# Patient Record
Sex: FEMALE | Race: BLACK OR AFRICAN AMERICAN | NOT HISPANIC OR LATINO | Employment: OTHER | URBAN - METROPOLITAN AREA
[De-identification: names, ages, dates, MRNs, and addresses within clinical notes are randomized per-mention and may not be internally consistent; named-entity substitution may affect disease eponyms.]

---

## 2018-01-26 ENCOUNTER — OFFICE VISIT (OUTPATIENT)
Dept: MEDICAL GROUP | Facility: MEDICAL CENTER | Age: 33
End: 2018-01-26
Payer: COMMERCIAL

## 2018-01-26 ENCOUNTER — HOSPITAL ENCOUNTER (OUTPATIENT)
Facility: MEDICAL CENTER | Age: 33
End: 2018-01-26
Attending: FAMILY MEDICINE
Payer: COMMERCIAL

## 2018-01-26 VITALS
TEMPERATURE: 98.6 F | BODY MASS INDEX: 33.86 KG/M2 | SYSTOLIC BLOOD PRESSURE: 110 MMHG | HEIGHT: 62 IN | OXYGEN SATURATION: 95 % | WEIGHT: 184 LBS | DIASTOLIC BLOOD PRESSURE: 86 MMHG | HEART RATE: 100 BPM

## 2018-01-26 DIAGNOSIS — R11.14 BILIOUS VOMITING WITH NAUSEA: ICD-10-CM

## 2018-01-26 DIAGNOSIS — F31.9 BIPOLAR 1 DISORDER (HCC): ICD-10-CM

## 2018-01-26 DIAGNOSIS — R30.0 DYSURIA: ICD-10-CM

## 2018-01-26 DIAGNOSIS — E66.9 OBESITY (BMI 30-39.9): ICD-10-CM

## 2018-01-26 DIAGNOSIS — N76.0 ACUTE VAGINITIS: ICD-10-CM

## 2018-01-26 DIAGNOSIS — F41.1 GAD (GENERALIZED ANXIETY DISORDER): ICD-10-CM

## 2018-01-26 DIAGNOSIS — K27.9 PUD (PEPTIC ULCER DISEASE): ICD-10-CM

## 2018-01-26 DIAGNOSIS — R10.30 LOWER ABDOMINAL PAIN: ICD-10-CM

## 2018-01-26 LAB
APPEARANCE UR: NORMAL
BILIRUB UR STRIP-MCNC: NEGATIVE MG/DL
CANDIDA DNA VAG QL PROBE+SIG AMP: NEGATIVE
COLOR UR AUTO: NORMAL
G VAGINALIS DNA VAG QL PROBE+SIG AMP: NEGATIVE
GLUCOSE UR STRIP.AUTO-MCNC: NEGATIVE MG/DL
KETONES UR STRIP.AUTO-MCNC: NEGATIVE MG/DL
LEUKOCYTE ESTERASE UR QL STRIP.AUTO: NEGATIVE
NITRITE UR QL STRIP.AUTO: NEGATIVE
PH UR STRIP.AUTO: 6 [PH] (ref 5–8)
PROT UR QL STRIP: NORMAL MG/DL
RBC UR QL AUTO: NEGATIVE
SP GR UR STRIP.AUTO: 1.02
T VAGINALIS DNA VAG QL PROBE+SIG AMP: NEGATIVE
UROBILINOGEN UR STRIP-MCNC: NEGATIVE MG/DL

## 2018-01-26 PROCEDURE — 87480 CANDIDA DNA DIR PROBE: CPT

## 2018-01-26 PROCEDURE — 87591 N.GONORRHOEAE DNA AMP PROB: CPT

## 2018-01-26 PROCEDURE — 99204 OFFICE O/P NEW MOD 45 MIN: CPT | Mod: 25 | Performed by: FAMILY MEDICINE

## 2018-01-26 PROCEDURE — 99000 SPECIMEN HANDLING OFFICE-LAB: CPT | Performed by: FAMILY MEDICINE

## 2018-01-26 PROCEDURE — 87491 CHLMYD TRACH DNA AMP PROBE: CPT

## 2018-01-26 PROCEDURE — 87510 GARDNER VAG DNA DIR PROBE: CPT

## 2018-01-26 PROCEDURE — 87660 TRICHOMONAS VAGIN DIR PROBE: CPT

## 2018-01-26 PROCEDURE — 81002 URINALYSIS NONAUTO W/O SCOPE: CPT | Performed by: FAMILY MEDICINE

## 2018-01-26 PROCEDURE — 81001 URINALYSIS AUTO W/SCOPE: CPT

## 2018-01-26 RX ORDER — CLONAZEPAM 1 MG/1
1 TABLET ORAL 2 TIMES DAILY PRN
Refills: 0 | COMMUNITY
Start: 2018-01-02 | End: 2018-11-01

## 2018-01-26 RX ORDER — LAMOTRIGINE 200 MG/1
200 TABLET ORAL DAILY
COMMUNITY
End: 2018-07-09

## 2018-01-26 RX ORDER — ZOLPIDEM TARTRATE 10 MG/1
10 TABLET ORAL
Refills: 0 | COMMUNITY
Start: 2018-01-02

## 2018-01-26 RX ORDER — PROMETHAZINE HYDROCHLORIDE 25 MG/1
25 TABLET ORAL EVERY 6 HOURS PRN
Qty: 30 TAB | Refills: 0 | Status: SHIPPED | OUTPATIENT
Start: 2018-01-26 | End: 2018-07-09

## 2018-01-26 RX ORDER — BUPROPION HYDROCHLORIDE 150 MG/1
150 TABLET ORAL NIGHTLY PRN
Refills: 0 | COMMUNITY
Start: 2018-01-02 | End: 2018-07-09

## 2018-01-26 ASSESSMENT — PATIENT HEALTH QUESTIONNAIRE - PHQ9
5. POOR APPETITE OR OVEREATING: 1 - SEVERAL DAYS
CLINICAL INTERPRETATION OF PHQ2 SCORE: 2
SUM OF ALL RESPONSES TO PHQ QUESTIONS 1-9: 8

## 2018-01-26 NOTE — ASSESSMENT & PLAN NOTE
Symptom onset: 10 days ago   Current symptoms: painful, urgent, frequent voids. No blood noted in urine.  Since onset symptoms are: unchanged  Treatments tried: none  Associated symptoms: negative for fever,   Is having flank pain, nausea and vomiting, and slight vaginal discharge  History is positive for frequent UTI.

## 2018-01-26 NOTE — PATIENT INSTRUCTIONS
Nausea and Vomiting  Nausea is a sick feeling that often comes before throwing up (vomiting). Vomiting is a reflex where stomach contents come out of your mouth. Vomiting can cause severe loss of body fluids (dehydration). Children and elderly adults can become dehydrated quickly, especially if they also have diarrhea. Nausea and vomiting are symptoms of a condition or disease. It is important to find the cause of your symptoms.  CAUSES   · Direct irritation of the stomach lining. This irritation can result from increased acid production (gastroesophageal reflux disease), infection, food poisoning, taking certain medicines (such as nonsteroidal anti-inflammatory drugs), alcohol use, or tobacco use.  · Signals from the brain. These signals could be caused by a headache, heat exposure, an inner ear disturbance, increased pressure in the brain from injury, infection, a tumor, or a concussion, pain, emotional stimulus, or metabolic problems.  · An obstruction in the gastrointestinal tract (bowel obstruction).  · Illnesses such as diabetes, hepatitis, gallbladder problems, appendicitis, kidney problems, cancer, sepsis, atypical symptoms of a heart attack, or eating disorders.  · Medical treatments such as chemotherapy and radiation.  · Receiving medicine that makes you sleep (general anesthetic) during surgery.  DIAGNOSIS  Your caregiver may ask for tests to be done if the problems do not improve after a few days. Tests may also be done if symptoms are severe or if the reason for the nausea and vomiting is not clear. Tests may include:  · Urine tests.  · Blood tests.  · Stool tests.  · Cultures (to look for evidence of infection).  · X-rays or other imaging studies.  Test results can help your caregiver make decisions about treatment or the need for additional tests.  TREATMENT  You need to stay well hydrated. Drink frequently but in small amounts. You may wish to drink water, sports drinks, clear broth, or eat frozen  ice pops or gelatin dessert to help stay hydrated. When you eat, eating slowly may help prevent nausea. There are also some antinausea medicines that may help prevent nausea.  HOME CARE INSTRUCTIONS   · Take all medicine as directed by your caregiver.  · If you do not have an appetite, do not force yourself to eat. However, you must continue to drink fluids.  · If you have an appetite, eat a normal diet unless your caregiver tells you differently.  ¨ Eat a variety of complex carbohydrates (rice, wheat, potatoes, bread), lean meats, yogurt, fruits, and vegetables.  ¨ Avoid high-fat foods because they are more difficult to digest.  · Drink enough water and fluids to keep your urine clear or pale yellow.  · If you are dehydrated, ask your caregiver for specific rehydration instructions. Signs of dehydration may include:  ¨ Severe thirst.  ¨ Dry lips and mouth.  ¨ Dizziness.  ¨ Dark urine.  ¨ Decreasing urine frequency and amount.  ¨ Confusion.  ¨ Rapid breathing or pulse.  SEEK IMMEDIATE MEDICAL CARE IF:   · You have blood or brown flecks (like coffee grounds) in your vomit.  · You have black or bloody stools.  · You have a severe headache or stiff neck.  · You are confused.  · You have severe abdominal pain.  · You have chest pain or trouble breathing.  · You do not urinate at least once every 8 hours.  · You develop cold or clammy skin.  · You continue to vomit for longer than 24 to 48 hours.  · You have a fever.  MAKE SURE YOU:   · Understand these instructions.  · Will watch your condition.  · Will get help right away if you are not doing well or get worse.     This information is not intended to replace advice given to you by your health care provider. Make sure you discuss any questions you have with your health care provider.     Document Released: 12/18/2006 Document Revised: 03/11/2013 Document Reviewed: 05/16/2012  [employer] Interactive Patient Education ©2016 [employer]

## 2018-01-26 NOTE — ASSESSMENT & PLAN NOTE
Vomiting since yesterday - vomited in the exam room.  Ate pizza last night for dinner.  No diarrhea. Zofran causes constipation.

## 2018-01-27 LAB
AMORPH CRY #/AREA URNS HPF: PRESENT /HPF
APPEARANCE UR: ABNORMAL
BACTERIA #/AREA URNS HPF: NEGATIVE /HPF
BILIRUB UR QL STRIP.AUTO: NEGATIVE
C TRACH DNA SPEC QL NAA+PROBE: NEGATIVE
COLOR UR: YELLOW
CULTURE IF INDICATED INDCX: NO UA CULTURE
EPI CELLS #/AREA URNS HPF: NORMAL /HPF
GLUCOSE UR STRIP.AUTO-MCNC: NEGATIVE MG/DL
HYALINE CASTS #/AREA URNS LPF: NORMAL /LPF
KETONES UR STRIP.AUTO-MCNC: NEGATIVE MG/DL
LEUKOCYTE ESTERASE UR QL STRIP.AUTO: NEGATIVE
MICRO URNS: ABNORMAL
MUCOUS THREADS #/AREA URNS HPF: NORMAL /HPF
N GONORRHOEA DNA SPEC QL NAA+PROBE: NEGATIVE
NITRITE UR QL STRIP.AUTO: NEGATIVE
PH UR STRIP.AUTO: 6 [PH]
PROT UR QL STRIP: 30 MG/DL
RBC # URNS HPF: NORMAL /HPF
RBC UR QL AUTO: NEGATIVE
RENAL EPI CELLS #/AREA URNS HPF: NORMAL /HPF
SP GR UR STRIP.AUTO: 1.02
SPECIMEN SOURCE: NORMAL
UROBILINOGEN UR STRIP.AUTO-MCNC: NORMAL MG/DL
WBC #/AREA URNS HPF: NORMAL /HPF

## 2018-01-31 ENCOUNTER — TELEPHONE (OUTPATIENT)
Dept: MEDICAL GROUP | Facility: MEDICAL CENTER | Age: 33
End: 2018-01-31

## 2018-01-31 NOTE — TELEPHONE ENCOUNTER
----- Message from Hallie Norris M.D. sent at 1/31/2018  8:32 AM PST -----  Please notify patient of their normal lab results.  Hallie Norris M.D.

## 2018-01-31 NOTE — LETTER
January 31, 2018        Kate Backner  [address]  [facility] [address]        Dear Kate:    This letter is to notify you that your lab results were normal.     If you have any questions or concerns, please don't hesitate to call.        Sincerely,        Hallie Norris M.D.    Electronically Signed

## 2018-02-04 NOTE — ASSESSMENT & PLAN NOTE
Patient has been diagnosed with bipolar.  Stable. Currently taking Wellbutrin as prescribed.   Denies side effects and is tolerating well.  Mood is improved with current medication and therapy.    Patient denies SI/HI.  Depression Screen (PHQ-2/PHQ-9) 1/26/2018   PHQ-2 Total Score 2   PHQ-9 Total Score 8

## 2018-02-04 NOTE — ASSESSMENT & PLAN NOTE
Patient has a history of PUD.  Denies early satiety, unintentional weight loss, choking, persistent burning pain in chest or upper abdomen.  No black or bloody stools.

## 2018-02-04 NOTE — PROGRESS NOTES
Chief Complaint   Patient presents with   • Establish Care     from [place], saw [facility] once   • Urinary Frequency     w/ abdominal pain, cloudy, odorous urine, x few weeks         Kate Shelton is a 33 y.o. female here to establish care and for evaluation and management of:        HPI:    Bilious vomiting with nausea  Vomiting since yesterday - vomited in the exam room.  Ate pizza last night for dinner.  No diarrhea. Zofran causes constipation.    Dysuria  Symptom onset: 10 days ago   Current symptoms: painful, urgent, frequent voids. No blood noted in urine.  Since onset symptoms are: unchanged  Treatments tried: none  Associated symptoms: negative for fever,   Is having flank pain, nausea and vomiting, and slight vaginal discharge  History is positive for frequent UTI.       Acute vaginitis  Patient complains of a white vaginal discharge with foul odor to her urine.    Bipolar 1 disorder (CMS-[facility])  Patient has been diagnosed with bipolar.  Stable. Currently taking Wellbutrin as prescribed.   Denies side effects and is tolerating well.  Mood is improved with current medication and therapy.    Patient denies SI/HI.  Depression Screen (PHQ-2/PHQ-9) 1/26/2018   PHQ-2 Total Score 2   PHQ-9 Total Score 8           ERICA (generalized anxiety disorder)  Patient complains of ERICA.  Recently moved from [facility].    PUD (peptic ulcer disease)  Patient has a history of PUD.  Denies early satiety, unintentional weight loss, choking, persistent burning pain in chest or upper abdomen.  No black or bloody stools.        Allergies   Allergen Reactions   • Nsaids      Hx ulcers   • Zofran [Ondansetron]        Current medicines (including changes today)  Current Outpatient Prescriptions   Medication Sig Dispense Refill   • buPROPion (WELLBUTRIN XL) 150 MG XL tablet Take 150 mg by mouth at bedtime as needed.  0   • clonazepam (KLONOPIN) 1 MG Tab Take 1 mg by mouth 2 times a day as needed.  0   • zolpidem (AMBIEN) 10 MG Tab Take 10 mg  "by mouth at bedtime as needed.  0   • lamotrigine (LAMICTAL) 200 MG tablet Take 200 mg by mouth every day.     • promethazine (PHENERGAN) 25 MG Tab Take 1 Tab by mouth every 6 hours as needed for Nausea/Vomiting. 30 Tab 0     No current facility-administered medications for this visit.      She  has a past medical history of Anxiety; Bipolar 1 disorder (CMS-[facility]); Depression; DVT of upper extremity (deep vein thrombosis) (CMS-HCC); GERD (gastroesophageal reflux disease); IBD (inflammatory bowel disease); PTSD (post-traumatic stress disorder); and PUD (peptic ulcer disease).  She  has a past surgical history that includes mammoplasty augmentation.  Social History   Substance Use Topics   • Smoking status: Current Every Day Smoker     Packs/day: 0.50     Years: 6.00     Types: Cigarettes   • Smokeless tobacco: Never Used   • Alcohol use Yes      Comment: occasionally     Social History     Social History Narrative   • No narrative on file     Family History   Problem Relation Age of Onset   • Psychiatry Mother    • Other Mother      killed in [place]   • Heart Disease Father    • Stroke Father      Family Status   Relation Status   • Mother    • Father Alive   • Maternal Grandmother Alive   • Maternal Grandfather Alive         ROS  No fever or chills.   No chest pain or palpitations.  No cough or SOB.  .  No black or bloody stools.  All other systems reviewed and are negative     Objective:     Blood pressure 110/86, pulse 100, temperature 37 °C (98.6 °F), height 1.575 m (5' 2\"), weight 83.5 kg (184 lb), SpO2 95 %, not currently breastfeeding. Body mass index is 33.65 kg/m².  Physical Exam:      Well developed, well nourished.  Alert, oriented in no acute distress.  Psych: Eye contact is good, speech goal directed, affect calm  Eyes: conjunctiva non-injected, sclera non-icteric.  Ears: Pinna normal. TM pearly gray.   Nose: Nares are patent.  Normal mucosa  Mouth: Oral mucous membranes pink and moist with no " lesions.  Neck Supple.  No adenopathy or masses in the neck or supraclavicular regions. No thyromegaly  Lungs: clear to auscultation bilaterally with good excursion. No wheezes or rhonchi  CV: regular rate and rhythm. No murmur  Abdomen: soft, nontender, no masses or organomegaly.  No rebound or guarding.  PELVIC:Perineum and external genitalia normal without rash. Vagina with white discharge. Cervix without visible lesions or discharge. Bimanual exam without adnexal masses or cervical motion tenderness.  Ext: no edema, color normal, vascularity normal, temperature normal      Assessment and Plan:   The following treatment plan was discussed    1. Bilious vomiting with nausea  Increase fluids and rest.  Phenergan as needed.  Go to ED if persists  - POCT Urinalysis  - CBC WITH DIFFERENTIAL; Future  - COMP METABOLIC PANEL; Future  - WESTERGREN SED RATE; Future  - promethazine (PHENERGAN) 25 MG Tab; Take 1 Tab by mouth every 6 hours as needed for Nausea/Vomiting.  Dispense: 30 Tab; Refill: 0    2. Dysuria    - POCT Urinalysis  - URINALYSIS,CULTURE IF INDICATED; Future    3. Obesity (BMI 30-39.9)    - Patient identified as having weight management issue.  Appropriate orders and counseling given.    4. Acute vaginitis  Cultures pending  - VAGINAL PATHOGENS DNA PANEL; Future  - CHLAMYDIA/GC PCR URINE OR SWAB; Future    5. Bipolar 1 disorder (CMS-HCC)  Continue follow-up with psychiatrist    6. ERICA (generalized anxiety disorder)  Continue follow-up with psychiatrist    7. PUD (peptic ulcer disease)  Start PPI if symptoms start    8. Lower abdominal pain    - CBC WITH DIFFERENTIAL; Future  - COMP METABOLIC PANEL; Future  - WESTERGREN SED RATE; Future      Records requested.    Any change or worsening of signs or symptoms, patient encouraged to follow-up or report to the emergency room for further evaluation. Patient understands and agrees.    Followup: Return in about 4 weeks (around 2/23/2018).

## 2018-02-15 ENCOUNTER — TELEPHONE (OUTPATIENT)
Dept: MEDICAL GROUP | Facility: MEDICAL CENTER | Age: 33
End: 2018-02-15

## 2018-02-15 LAB
ALBUMIN SERPL-MCNC: 4.5 G/DL (ref 3.5–5.5)
ALBUMIN/GLOB SERPL: 1.9 {RATIO} (ref 1.2–2.2)
ALP SERPL-CCNC: 78 IU/L (ref 39–117)
ALT SERPL-CCNC: 12 IU/L (ref 0–32)
AST SERPL-CCNC: 17 IU/L (ref 0–40)
BASOPHILS # BLD AUTO: 0 X10E3/UL (ref 0–0.2)
BASOPHILS NFR BLD AUTO: 0 %
BILIRUB SERPL-MCNC: 0.4 MG/DL (ref 0–1.2)
BUN SERPL-MCNC: 9 MG/DL (ref 6–20)
BUN/CREAT SERPL: 11 (ref 9–23)
CALCIUM SERPL-MCNC: 9.4 MG/DL (ref 8.7–10.2)
CHLORIDE SERPL-SCNC: 103 MMOL/L (ref 96–106)
CO2 SERPL-SCNC: 21 MMOL/L (ref 18–29)
CREAT SERPL-MCNC: 0.84 MG/DL (ref 0.57–1)
EOSINOPHIL # BLD AUTO: 0.1 X10E3/UL (ref 0–0.4)
EOSINOPHIL NFR BLD AUTO: 1 %
ERYTHROCYTE [DISTWIDTH] IN BLOOD BY AUTOMATED COUNT: 13.2 % (ref 12.3–15.4)
ERYTHROCYTE [SEDIMENTATION RATE] IN BLOOD BY WESTERGREN METHOD: 2 MM/HR (ref 0–32)
GFR SERPLBLD CREATININE-BSD FMLA CKD-EPI: 107 ML/MIN/1.73
GFR SERPLBLD CREATININE-BSD FMLA CKD-EPI: 93 ML/MIN/1.73
GLOBULIN SER CALC-MCNC: 2.4 G/DL (ref 1.5–4.5)
GLUCOSE SERPL-MCNC: 80 MG/DL (ref 65–99)
HCT VFR BLD AUTO: 46.1 % (ref 34–46.6)
HGB BLD-MCNC: 15.4 G/DL (ref 11.1–15.9)
IMM GRANULOCYTES # BLD: 0 X10E3/UL (ref 0–0.1)
IMM GRANULOCYTES NFR BLD: 0 %
IMMATURE CELLS  115398: NORMAL
LYMPHOCYTES # BLD AUTO: 2.9 X10E3/UL (ref 0.7–3.1)
LYMPHOCYTES NFR BLD AUTO: 31 %
MCH RBC QN AUTO: 31.2 PG (ref 26.6–33)
MCHC RBC AUTO-ENTMCNC: 33.4 G/DL (ref 31.5–35.7)
MCV RBC AUTO: 94 FL (ref 79–97)
MONOCYTES # BLD AUTO: 0.6 X10E3/UL (ref 0.1–0.9)
MONOCYTES NFR BLD AUTO: 6 %
MORPHOLOGY BLD-IMP: NORMAL
NEUTROPHILS # BLD AUTO: 5.7 X10E3/UL (ref 1.4–7)
NEUTROPHILS NFR BLD AUTO: 62 %
NRBC BLD AUTO-RTO: NORMAL %
PLATELET # BLD AUTO: 275 X10E3/UL (ref 150–379)
POTASSIUM SERPL-SCNC: 4.3 MMOL/L (ref 3.5–5.2)
PROT SERPL-MCNC: 6.9 G/DL (ref 6–8.5)
RBC # BLD AUTO: 4.93 X10E6/UL (ref 3.77–5.28)
SODIUM SERPL-SCNC: 139 MMOL/L (ref 134–144)
WBC # BLD AUTO: 9.4 X10E3/UL (ref 3.4–10.8)

## 2018-02-15 NOTE — TELEPHONE ENCOUNTER
Phone Number Called: [phone] (home)     Message: Pt informed labs normal.     Left Message for patient to call back: no

## 2018-02-15 NOTE — TELEPHONE ENCOUNTER
----- Message from Hallie Norris M.D. sent at 2/15/2018 12:01 PM PST -----  Please notify patient of their normal lab results.  Hallie Norris M.D.

## 2018-07-09 ENCOUNTER — APPOINTMENT (OUTPATIENT)
Dept: RADIOLOGY | Facility: MEDICAL CENTER | Age: 33
End: 2018-07-09
Attending: EMERGENCY MEDICINE
Payer: COMMERCIAL

## 2018-07-09 ENCOUNTER — HOSPITAL ENCOUNTER (OUTPATIENT)
Facility: MEDICAL CENTER | Age: 33
End: 2018-07-10
Attending: EMERGENCY MEDICINE | Admitting: INTERNAL MEDICINE
Payer: COMMERCIAL

## 2018-07-09 DIAGNOSIS — I26.99 OTHER ACUTE PULMONARY EMBOLISM WITHOUT ACUTE COR PULMONALE (HCC): ICD-10-CM

## 2018-07-09 PROBLEM — F17.200 TOBACCO USE DISORDER: Status: ACTIVE | Noted: 2018-07-09

## 2018-07-09 PROBLEM — J02.9 PHARYNGITIS: Status: ACTIVE | Noted: 2018-07-09

## 2018-07-09 PROBLEM — D68.59 HYPERCOAGULABLE STATE (HCC): Status: ACTIVE | Noted: 2018-07-09

## 2018-07-09 LAB
ANION GAP SERPL CALC-SCNC: 6 MMOL/L (ref 0–11.9)
BASOPHILS # BLD AUTO: 0.3 % (ref 0–1.8)
BASOPHILS # BLD: 0.03 K/UL (ref 0–0.12)
BUN SERPL-MCNC: <5 MG/DL (ref 8–22)
CALCIUM SERPL-MCNC: 9 MG/DL (ref 8.4–10.2)
CHLORIDE SERPL-SCNC: 106 MMOL/L (ref 96–112)
CO2 SERPL-SCNC: 25 MMOL/L (ref 20–33)
CREAT SERPL-MCNC: 0.81 MG/DL (ref 0.5–1.4)
EOSINOPHIL # BLD AUTO: 0.13 K/UL (ref 0–0.51)
EOSINOPHIL NFR BLD: 1.1 % (ref 0–6.9)
ERYTHROCYTE [DISTWIDTH] IN BLOOD BY AUTOMATED COUNT: 44.2 FL (ref 35.9–50)
GLUCOSE SERPL-MCNC: 79 MG/DL (ref 65–99)
HCG SERPL QL: NEGATIVE
HCT VFR BLD AUTO: 45.8 % (ref 37–47)
HGB BLD-MCNC: 15.9 G/DL (ref 12–16)
IMM GRANULOCYTES # BLD AUTO: 0.04 K/UL (ref 0–0.11)
IMM GRANULOCYTES NFR BLD AUTO: 0.3 % (ref 0–0.9)
LYMPHOCYTES # BLD AUTO: 2.61 K/UL (ref 1–4.8)
LYMPHOCYTES NFR BLD: 22.5 % (ref 22–41)
MCH RBC QN AUTO: 32.8 PG (ref 27–33)
MCHC RBC AUTO-ENTMCNC: 34.7 G/DL (ref 33.6–35)
MCV RBC AUTO: 94.4 FL (ref 81.4–97.8)
MONOCYTES # BLD AUTO: 0.83 K/UL (ref 0–0.85)
MONOCYTES NFR BLD AUTO: 7.2 % (ref 0–13.4)
NEUTROPHILS # BLD AUTO: 7.94 K/UL (ref 2–7.15)
NEUTROPHILS NFR BLD: 68.6 % (ref 44–72)
NRBC # BLD AUTO: 0 K/UL
NRBC BLD-RTO: 0 /100 WBC
PLATELET # BLD AUTO: 261 K/UL (ref 164–446)
PMV BLD AUTO: 10.7 FL (ref 9–12.9)
POTASSIUM SERPL-SCNC: 3.8 MMOL/L (ref 3.6–5.5)
RBC # BLD AUTO: 4.85 M/UL (ref 4.2–5.4)
S PYO AG THROAT QL: NORMAL
SIGNIFICANT IND 70042: NORMAL
SITE SITE: NORMAL
SODIUM SERPL-SCNC: 137 MMOL/L (ref 135–145)
SOURCE SOURCE: NORMAL
WBC # BLD AUTO: 11.6 K/UL (ref 4.8–10.8)

## 2018-07-09 PROCEDURE — 96374 THER/PROPH/DIAG INJ IV PUSH: CPT

## 2018-07-09 PROCEDURE — 94760 N-INVAS EAR/PLS OXIMETRY 1: CPT

## 2018-07-09 PROCEDURE — 99220 PR INITIAL OBSERVATION CARE,LEVL III: CPT | Performed by: INTERNAL MEDICINE

## 2018-07-09 PROCEDURE — 93005 ELECTROCARDIOGRAM TRACING: CPT

## 2018-07-09 PROCEDURE — 700117 HCHG RX CONTRAST REV CODE 255: Performed by: EMERGENCY MEDICINE

## 2018-07-09 PROCEDURE — 87081 CULTURE SCREEN ONLY: CPT

## 2018-07-09 PROCEDURE — 96372 THER/PROPH/DIAG INJ SC/IM: CPT

## 2018-07-09 PROCEDURE — 700111 HCHG RX REV CODE 636 W/ 250 OVERRIDE (IP): Performed by: EMERGENCY MEDICINE

## 2018-07-09 PROCEDURE — G0378 HOSPITAL OBSERVATION PER HR: HCPCS

## 2018-07-09 PROCEDURE — 71275 CT ANGIOGRAPHY CHEST: CPT

## 2018-07-09 PROCEDURE — 94640 AIRWAY INHALATION TREATMENT: CPT

## 2018-07-09 PROCEDURE — A9270 NON-COVERED ITEM OR SERVICE: HCPCS | Performed by: INTERNAL MEDICINE

## 2018-07-09 PROCEDURE — 80048 BASIC METABOLIC PNL TOTAL CA: CPT

## 2018-07-09 PROCEDURE — 93005 ELECTROCARDIOGRAM TRACING: CPT | Performed by: EMERGENCY MEDICINE

## 2018-07-09 PROCEDURE — 700101 HCHG RX REV CODE 250: Performed by: EMERGENCY MEDICINE

## 2018-07-09 PROCEDURE — 87880 STREP A ASSAY W/OPTIC: CPT

## 2018-07-09 PROCEDURE — 700102 HCHG RX REV CODE 250 W/ 637 OVERRIDE(OP): Performed by: INTERNAL MEDICINE

## 2018-07-09 PROCEDURE — 700111 HCHG RX REV CODE 636 W/ 250 OVERRIDE (IP)

## 2018-07-09 PROCEDURE — 36415 COLL VENOUS BLD VENIPUNCTURE: CPT

## 2018-07-09 PROCEDURE — 84703 CHORIONIC GONADOTROPIN ASSAY: CPT

## 2018-07-09 PROCEDURE — 85025 COMPLETE CBC W/AUTO DIFF WBC: CPT

## 2018-07-09 PROCEDURE — 71045 X-RAY EXAM CHEST 1 VIEW: CPT

## 2018-07-09 PROCEDURE — 96375 TX/PRO/DX INJ NEW DRUG ADDON: CPT

## 2018-07-09 PROCEDURE — 99285 EMERGENCY DEPT VISIT HI MDM: CPT

## 2018-07-09 RX ORDER — LAMOTRIGINE 150 MG/1
200 TABLET ORAL 2 TIMES DAILY
COMMUNITY
End: 2018-11-01

## 2018-07-09 RX ORDER — PROMETHAZINE HYDROCHLORIDE 25 MG/1
12.5-25 TABLET ORAL EVERY 4 HOURS PRN
Status: DISCONTINUED | OUTPATIENT
Start: 2018-07-09 | End: 2018-07-10 | Stop reason: HOSPADM

## 2018-07-09 RX ORDER — PROMETHAZINE HYDROCHLORIDE 25 MG/1
12.5-25 SUPPOSITORY RECTAL EVERY 4 HOURS PRN
Status: DISCONTINUED | OUTPATIENT
Start: 2018-07-09 | End: 2018-07-10 | Stop reason: HOSPADM

## 2018-07-09 RX ORDER — ZOLPIDEM TARTRATE 5 MG/1
10 TABLET ORAL NIGHTLY PRN
Status: DISCONTINUED | OUTPATIENT
Start: 2018-07-09 | End: 2018-07-10 | Stop reason: HOSPADM

## 2018-07-09 RX ORDER — NICOTINE 21 MG/24HR
21 PATCH, TRANSDERMAL 24 HOURS TRANSDERMAL
Status: DISCONTINUED | OUTPATIENT
Start: 2018-07-10 | End: 2018-07-10 | Stop reason: HOSPADM

## 2018-07-09 RX ORDER — MORPHINE SULFATE 4 MG/ML
4 INJECTION, SOLUTION INTRAMUSCULAR; INTRAVENOUS ONCE
Status: COMPLETED | OUTPATIENT
Start: 2018-07-09 | End: 2018-07-09

## 2018-07-09 RX ORDER — METOCLOPRAMIDE HYDROCHLORIDE 5 MG/ML
5-10 INJECTION INTRAMUSCULAR; INTRAVENOUS ONCE
Status: COMPLETED | OUTPATIENT
Start: 2018-07-09 | End: 2018-07-09

## 2018-07-09 RX ORDER — LAMOTRIGINE 25 MG/1
150 TABLET ORAL 2 TIMES DAILY
Status: DISCONTINUED | OUTPATIENT
Start: 2018-07-09 | End: 2018-07-10 | Stop reason: HOSPADM

## 2018-07-09 RX ORDER — CLONAZEPAM 0.5 MG/1
1 TABLET ORAL 2 TIMES DAILY PRN
Status: DISCONTINUED | OUTPATIENT
Start: 2018-07-09 | End: 2018-07-10 | Stop reason: HOSPADM

## 2018-07-09 RX ORDER — ACETAMINOPHEN 325 MG/1
650 TABLET ORAL EVERY 6 HOURS PRN
Status: DISCONTINUED | OUTPATIENT
Start: 2018-07-09 | End: 2018-07-10 | Stop reason: HOSPADM

## 2018-07-09 RX ADMIN — MORPHINE SULFATE 4 MG: 4 INJECTION INTRAVENOUS at 18:23

## 2018-07-09 RX ADMIN — ENOXAPARIN SODIUM 80 MG: 100 INJECTION SUBCUTANEOUS at 19:34

## 2018-07-09 RX ADMIN — CLONAZEPAM 1 MG: 0.5 TABLET ORAL at 23:38

## 2018-07-09 RX ADMIN — ZOLPIDEM TARTRATE 10 MG: 5 TABLET ORAL at 23:38

## 2018-07-09 RX ADMIN — ALBUTEROL SULFATE 2.5 MG: 2.5 SOLUTION RESPIRATORY (INHALATION) at 19:45

## 2018-07-09 RX ADMIN — METOCLOPRAMIDE 10 MG: 5 INJECTION, SOLUTION INTRAMUSCULAR; INTRAVENOUS at 18:23

## 2018-07-09 RX ADMIN — LAMOTRIGINE 150 MG: 25 TABLET ORAL at 23:37

## 2018-07-09 RX ADMIN — IOHEXOL 70 ML: 350 INJECTION, SOLUTION INTRAVENOUS at 18:58

## 2018-07-09 ASSESSMENT — ENCOUNTER SYMPTOMS
SPUTUM PRODUCTION: 1
HEMOPTYSIS: 1
NAUSEA: 1
DIAPHORESIS: 1
WHEEZING: 0
CHILLS: 1
FEVER: 0
DEPRESSION: 0
SORE THROAT: 1
PALPITATIONS: 1
COUGH: 1
ABDOMINAL PAIN: 0
CONSTIPATION: 0
DIARRHEA: 0
SHORTNESS OF BREATH: 1
DIZZINESS: 0
EYE DISCHARGE: 0
HEADACHES: 1
VOMITING: 0
EYE PAIN: 0

## 2018-07-09 ASSESSMENT — COPD QUESTIONNAIRES
DO YOU EVER COUGH UP ANY MUCUS OR PHLEGM?: NO/ONLY WITH OCCASIONAL COLDS OR INFECTIONS
HAVE YOU SMOKED AT LEAST 100 CIGARETTES IN YOUR ENTIRE LIFE: YES
DURING THE PAST 4 WEEKS HOW MUCH DID YOU FEEL SHORT OF BREATH: SOME OF THE TIME
COPD SCREENING SCORE: 3

## 2018-07-09 ASSESSMENT — LIFESTYLE VARIABLES
SUBSTANCE_ABUSE: 0
EVER_SMOKED: YES

## 2018-07-09 ASSESSMENT — PAIN SCALES - GENERAL
PAINLEVEL_OUTOF10: 6
PAINLEVEL_OUTOF10: 5
PAINLEVEL_OUTOF10: 4

## 2018-07-10 ENCOUNTER — APPOINTMENT (OUTPATIENT)
Dept: RADIOLOGY | Facility: MEDICAL CENTER | Age: 33
End: 2018-07-10
Attending: HOSPITALIST
Payer: COMMERCIAL

## 2018-07-10 ENCOUNTER — PATIENT OUTREACH (OUTPATIENT)
Dept: HEALTH INFORMATION MANAGEMENT | Facility: OTHER | Age: 33
End: 2018-07-10

## 2018-07-10 VITALS
RESPIRATION RATE: 18 BRPM | HEIGHT: 61 IN | TEMPERATURE: 98.1 F | WEIGHT: 177.91 LBS | HEART RATE: 98 BPM | DIASTOLIC BLOOD PRESSURE: 75 MMHG | SYSTOLIC BLOOD PRESSURE: 111 MMHG | OXYGEN SATURATION: 91 % | BODY MASS INDEX: 33.59 KG/M2

## 2018-07-10 LAB
ALBUMIN SERPL BCP-MCNC: 4.7 G/DL (ref 3.2–4.9)
ALBUMIN/GLOB SERPL: 1.8 G/DL
ALP SERPL-CCNC: 73 U/L (ref 30–99)
ALT SERPL-CCNC: 11 U/L (ref 2–50)
ANION GAP SERPL CALC-SCNC: 8 MMOL/L (ref 0–11.9)
AST SERPL-CCNC: 15 U/L (ref 12–45)
BASOPHILS # BLD AUTO: 0.2 % (ref 0–1.8)
BASOPHILS # BLD: 0.02 K/UL (ref 0–0.12)
BILIRUB SERPL-MCNC: 0.8 MG/DL (ref 0.1–1.5)
BUN SERPL-MCNC: 5 MG/DL (ref 8–22)
CALCIUM SERPL-MCNC: 8.9 MG/DL (ref 8.4–10.2)
CHLORIDE SERPL-SCNC: 107 MMOL/L (ref 96–112)
CO2 SERPL-SCNC: 24 MMOL/L (ref 20–33)
CREAT SERPL-MCNC: 0.72 MG/DL (ref 0.5–1.4)
EKG IMPRESSION: NORMAL
EOSINOPHIL # BLD AUTO: 0.12 K/UL (ref 0–0.51)
EOSINOPHIL NFR BLD: 1.1 % (ref 0–6.9)
ERYTHROCYTE [DISTWIDTH] IN BLOOD BY AUTOMATED COUNT: 44.4 FL (ref 35.9–50)
GLOBULIN SER CALC-MCNC: 2.6 G/DL (ref 1.9–3.5)
GLUCOSE SERPL-MCNC: 101 MG/DL (ref 65–99)
HCT VFR BLD AUTO: 45.9 % (ref 37–47)
HGB BLD-MCNC: 15.8 G/DL (ref 12–16)
IMM GRANULOCYTES # BLD AUTO: 0.03 K/UL (ref 0–0.11)
IMM GRANULOCYTES NFR BLD AUTO: 0.3 % (ref 0–0.9)
LYMPHOCYTES # BLD AUTO: 3.32 K/UL (ref 1–4.8)
LYMPHOCYTES NFR BLD: 29.8 % (ref 22–41)
MCH RBC QN AUTO: 32.2 PG (ref 27–33)
MCHC RBC AUTO-ENTMCNC: 34.4 G/DL (ref 33.6–35)
MCV RBC AUTO: 93.5 FL (ref 81.4–97.8)
MONOCYTES # BLD AUTO: 0.95 K/UL (ref 0–0.85)
MONOCYTES NFR BLD AUTO: 8.5 % (ref 0–13.4)
NEUTROPHILS # BLD AUTO: 6.7 K/UL (ref 2–7.15)
NEUTROPHILS NFR BLD: 60.1 % (ref 44–72)
NRBC # BLD AUTO: 0 K/UL
NRBC BLD-RTO: 0 /100 WBC
PLATELET # BLD AUTO: 247 K/UL (ref 164–446)
PMV BLD AUTO: 10.7 FL (ref 9–12.9)
POTASSIUM SERPL-SCNC: 3.8 MMOL/L (ref 3.6–5.5)
PROT SERPL-MCNC: 7.3 G/DL (ref 6–8.2)
RBC # BLD AUTO: 4.91 M/UL (ref 4.2–5.4)
SODIUM SERPL-SCNC: 139 MMOL/L (ref 135–145)
TROPONIN I SERPL-MCNC: <0.02 NG/ML (ref 0–0.04)
WBC # BLD AUTO: 11.1 K/UL (ref 4.8–10.8)

## 2018-07-10 PROCEDURE — A9270 NON-COVERED ITEM OR SERVICE: HCPCS | Performed by: INTERNAL MEDICINE

## 2018-07-10 PROCEDURE — 700102 HCHG RX REV CODE 250 W/ 637 OVERRIDE(OP): Performed by: HOSPITALIST

## 2018-07-10 PROCEDURE — 93010 ELECTROCARDIOGRAM REPORT: CPT | Performed by: INTERNAL MEDICINE

## 2018-07-10 PROCEDURE — 700111 HCHG RX REV CODE 636 W/ 250 OVERRIDE (IP): Performed by: INTERNAL MEDICINE

## 2018-07-10 PROCEDURE — 96376 TX/PRO/DX INJ SAME DRUG ADON: CPT

## 2018-07-10 PROCEDURE — 700102 HCHG RX REV CODE 250 W/ 637 OVERRIDE(OP): Performed by: INTERNAL MEDICINE

## 2018-07-10 PROCEDURE — A9270 NON-COVERED ITEM OR SERVICE: HCPCS | Performed by: HOSPITALIST

## 2018-07-10 PROCEDURE — G0378 HOSPITAL OBSERVATION PER HR: HCPCS

## 2018-07-10 PROCEDURE — 94760 N-INVAS EAR/PLS OXIMETRY 1: CPT

## 2018-07-10 PROCEDURE — 99217 PR OBSERVATION CARE DISCHARGE: CPT | Performed by: HOSPITALIST

## 2018-07-10 PROCEDURE — 700111 HCHG RX REV CODE 636 W/ 250 OVERRIDE (IP): Performed by: HOSPITALIST

## 2018-07-10 PROCEDURE — 84484 ASSAY OF TROPONIN QUANT: CPT

## 2018-07-10 PROCEDURE — 85025 COMPLETE CBC W/AUTO DIFF WBC: CPT

## 2018-07-10 PROCEDURE — 71045 X-RAY EXAM CHEST 1 VIEW: CPT

## 2018-07-10 PROCEDURE — 99406 BEHAV CHNG SMOKING 3-10 MIN: CPT

## 2018-07-10 PROCEDURE — 96375 TX/PRO/DX INJ NEW DRUG ADDON: CPT

## 2018-07-10 PROCEDURE — 93005 ELECTROCARDIOGRAM TRACING: CPT | Performed by: HOSPITALIST

## 2018-07-10 PROCEDURE — 80053 COMPREHEN METABOLIC PANEL: CPT

## 2018-07-10 RX ORDER — LORAZEPAM 1 MG/1
1 TABLET ORAL ONCE
Status: COMPLETED | OUTPATIENT
Start: 2018-07-10 | End: 2018-07-10

## 2018-07-10 RX ORDER — MORPHINE SULFATE 4 MG/ML
INJECTION, SOLUTION INTRAMUSCULAR; INTRAVENOUS
Status: COMPLETED
Start: 2018-07-10 | End: 2018-07-10

## 2018-07-10 RX ORDER — MORPHINE SULFATE 4 MG/ML
2 INJECTION, SOLUTION INTRAMUSCULAR; INTRAVENOUS ONCE
Status: COMPLETED | OUTPATIENT
Start: 2018-07-10 | End: 2018-07-10

## 2018-07-10 RX ADMIN — ACETAMINOPHEN 650 MG: 325 TABLET, FILM COATED ORAL at 04:06

## 2018-07-10 RX ADMIN — LAMOTRIGINE 150 MG: 25 TABLET ORAL at 06:39

## 2018-07-10 RX ADMIN — RIVAROXABAN 15 MG: 15 TABLET, FILM COATED ORAL at 13:05

## 2018-07-10 RX ADMIN — PROCHLORPERAZINE EDISYLATE 10 MG: 5 INJECTION INTRAMUSCULAR; INTRAVENOUS at 11:59

## 2018-07-10 RX ADMIN — NICOTINE 21 MG: 21 PATCH, EXTENDED RELEASE TRANSDERMAL at 06:38

## 2018-07-10 RX ADMIN — MORPHINE SULFATE 2 MG: 4 INJECTION INTRAVENOUS at 06:50

## 2018-07-10 RX ADMIN — LAMOTRIGINE 150 MG: 25 TABLET ORAL at 17:38

## 2018-07-10 RX ADMIN — PROMETHAZINE HYDROCHLORIDE 25 MG: 25 TABLET ORAL at 06:35

## 2018-07-10 RX ADMIN — RIVAROXABAN 15 MG: 15 TABLET, FILM COATED ORAL at 17:38

## 2018-07-10 RX ADMIN — LORAZEPAM 1 MG: 1 TABLET ORAL at 06:50

## 2018-07-10 ASSESSMENT — PAIN SCALES - GENERAL
PAINLEVEL_OUTOF10: 8
PAINLEVEL_OUTOF10: 0
PAINLEVEL_OUTOF10: 5

## 2018-07-10 ASSESSMENT — PATIENT HEALTH QUESTIONNAIRE - PHQ9
1. LITTLE INTEREST OR PLEASURE IN DOING THINGS: NOT AT ALL
2. FEELING DOWN, DEPRESSED, IRRITABLE, OR HOPELESS: NOT AT ALL
SUM OF ALL RESPONSES TO PHQ9 QUESTIONS 1 AND 2: 0

## 2018-07-10 NOTE — ED NOTES
Report received from Matheus CHAVIRA.  Assumed patient care. Pt assesement done.  Plan of care reviewed with patient.

## 2018-07-10 NOTE — DISCHARGE PLANNING
Pt's Xarelto 15MG cost $144.49 and pt's 20MG cost $103 and insurance.     SW signed pt up for a discount card.  Both prescriptions cost $10 each.     SW provided pt with the prescriptions and the card information.

## 2018-07-10 NOTE — CARE PLAN
Problem: Safety  Goal: Will remain free from injury    Intervention: Provide assistance with mobility   07/10/18 1549   OTHER   Assistance / Tolerance No assistance required;Tolerates Well

## 2018-07-10 NOTE — ED PROVIDER NOTES
ED Provider Note    CHIEF COMPLAINT  Chief Complaint   Patient presents with   • Sore Throat     sore throat x 5 days  cough started today with some streaks of blood in it  Also has chest pain   Chest pain, hemoptysis    HPI  Kate Shelton is a 32 y.o. female who presents complaining of chest pain.  The chest pain is located on the right side.  Sharp and stabbing.  Worse when she takes a deep breath or coughs.  She states she has been coughing up a small amount of blood today and her regular physician was concerned she might have a possible pulmonary embolus given her history of DVT.  Patient states she has had mild dyspnea although she states she is a smoker.  Patient's pain is worse with inspiration.  6/10 in severity.  Sharp and stabbing    REVIEW OF SYSTEMS  See HPI for further details.  Positive dyspnea.  No nausea or vomiting.  No diarrhea.  No constipation.  Positive sore throat.  All other systems are negative.    PAST MEDICAL HISTORY  Past Medical History:   Diagnosis Date   • Anxiety    • Bipolar 1 disorder (HCC)    • Depression    • DVT of upper extremity (deep vein thrombosis) ([facility])     From IV   • GERD (gastroesophageal reflux disease)    • IBD (inflammatory bowel disease)    • PTSD (post-traumatic stress disorder)    • PUD (peptic ulcer disease)        FAMILY HISTORY  Family History   Problem Relation Age of Onset   • Psychiatry Mother    • Other Mother      killed in [place]   • Heart Disease Father    • Stroke Father        SOCIAL HISTORY  Social History     Social History   • Marital status:      Spouse name: N/A   • Number of children: N/A   • Years of education: N/A     Social History Main Topics   • Smoking status: Current Every Day Smoker     Packs/day: 0.50     Years: 6.00     Types: Cigarettes   • Smokeless tobacco: Never Used   • Alcohol use Yes      Comment: occasionally   • Drug use: No   • Sexual activity: Yes     Partners: Male     Birth control/ protection: Implant     Other Topics  "Concern   • Not on file     Social History Narrative   • No narrative on file       SURGICAL HISTORY  Past Surgical History:   Procedure Laterality Date   • MAMMOPLASTY AUGMENTATION         CURRENT MEDICATIONS  Home Medications     Reviewed by Dakota Johnson (Pharmacy Tech) on 07/09/18 at 1655  Med List Status: Complete   Medication Last Dose Status   clonazepam (KLONOPIN) 1 MG Tab unknown Active   lamotrigine (LAMICTAL) 150 MG tablet 7/9/2018 Active   zolpidem (AMBIEN) 10 MG Tab unknown Active                ALLERGIES  Allergies   Allergen Reactions   • Nsaids      Hx ulcers   • Ondansetron        PHYSICAL EXAM  VITAL SIGNS: /80   Pulse 86   Temp 37 °C (98.6 °F)   Resp 18   Ht 1.549 m (5' 1\")   Wt 80.6 kg (177 lb 11.1 oz)   SpO2 98%   BMI 33.57 kg/m²   Constitutional: Well developed, Well nourished, No acute distress, Non-toxic appearance.  Pleasant.  Smiling.  Conversant  HENT: Normocephalic, Atraumatic, Bilateral external ears normal, Oropharynx moist, No oral exudates, Nose normal.   Eyes: KRISTY, EOMI, Conjunctiva normal, No discharge.   Neck: Normal range of motion, No tenderness, Supple, No stridor. No nuchal rigidity  Lymphatic: No lymphadenopathy noted.   Cardiovascular: Regular rate and rhythm without murmur rub or gallop  Thorax & Lungs: Mild x-ray wheezing  Abdomen: Bowel sounds normal, Soft, No tenderness, No masses,   Skin: Warm, Dry, No erythema, No rash.   Back: No tenderness, No CVA tenderness.   Extremities: No edema, No tenderness, No cyanosis, No clubbing. Dorsalis pedis pulses 2+ equal bilaterally. Radial pulses 2+ equal bilaterally.  Neurologic: Alert & oriented x 3, Normal motor function, Normal sensory function, No focal deficits noted.         RADIOLOGY/PROCEDURES  CT-CTA CHEST PULMONARY ARTERY W/ RECONS   Final Result      Filling defect within a right upper lobe pulmonary arterial segmental vessel consistent with pulmonary embolus. No evidence of right heart strain.    "   This was discussed with ELOISE ALSTON at 7:12 PM on 7/9/2018.      DX-CHEST-LIMITED (1 VIEW)   Final Result      No evidence of acute cardiopulmonary process.            Results for orders placed or performed during the hospital encounter of 07/09/18   RAPID STREP, CULT IF INDICATED (CULTURE IF NEGATIVE)   Result Value Ref Range    Significant Indicator NEG     Source CXBSI     Site THROAT     Rapid Strep Screen       Negative for Group A streptococcus.  A negative result may be obtained if the specimen is  inadequate or antigen concentration is below the  sensitivity of the test. Therefore,a negative result  obtained from a rapid group A Strep test should be followed  up with a culture.     CBC WITH DIFFERENTIAL   Result Value Ref Range    WBC 11.6 (H) 4.8 - 10.8 K/uL    RBC 4.85 4.20 - 5.40 M/uL    Hemoglobin 15.9 12.0 - 16.0 g/dL    Hematocrit 45.8 37.0 - 47.0 %    MCV 94.4 81.4 - 97.8 fL    MCH 32.8 27.0 - 33.0 pg    MCHC 34.7 33.6 - 35.0 g/dL    RDW 44.2 35.9 - 50.0 fL    Platelet Count 261 164 - 446 K/uL    MPV 10.7 9.0 - 12.9 fL    Neutrophils-Polys 68.60 44.00 - 72.00 %    Lymphocytes 22.50 22.00 - 41.00 %    Monocytes 7.20 0.00 - 13.40 %    Eosinophils 1.10 0.00 - 6.90 %    Basophils 0.30 0.00 - 1.80 %    Immature Granulocytes 0.30 0.00 - 0.90 %    Nucleated RBC 0.00 /100 WBC    Neutrophils (Absolute) 7.94 (H) 2.00 - 7.15 K/uL    Lymphs (Absolute) 2.61 1.00 - 4.80 K/uL    Monos (Absolute) 0.83 0.00 - 0.85 K/uL    Eos (Absolute) 0.13 0.00 - 0.51 K/uL    Baso (Absolute) 0.03 0.00 - 0.12 K/uL    Immature Granulocytes (abs) 0.04 0.00 - 0.11 K/uL    NRBC (Absolute) 0.00 K/uL   BASIC METABOLIC PANEL   Result Value Ref Range    Sodium 137 135 - 145 mmol/L    Potassium 3.8 3.6 - 5.5 mmol/L    Chloride 106 96 - 112 mmol/L    Co2 25 20 - 33 mmol/L    Glucose 79 65 - 99 mg/dL    Bun <5 (L) 8 - 22 mg/dL    Creatinine 0.81 0.50 - 1.40 mg/dL    Calcium 9.0 8.4 - 10.2 mg/dL    Anion Gap 6.0 0.0 - 11.9   HCG QUAL SERUM    Result Value Ref Range    Beta-Hcg Qualitative Serum Negative Negative   ESTIMATED GFR   Result Value Ref Range    GFR If African American >60 >60 mL/min/1.73 m 2    GFR If Non African American >60 >60 mL/min/1.73 m 2   EKG NOW   Result Value Ref Range    Report       [facility] Emergency Dept.    Test Date:  2018  Pt Name:    ISIDRA LAMAR              Department: [facility]  MRN:        [id number]                      Room:  Gender:     Female                       Technician: MONY  :        [id number]                   Requested By:ER TRIAGE PROTOCOL  Order #:    [id number]                    Reading MD:    Measurements  Intervals                                Axis  Rate:       99                           P:          67  NY:         139                          QRS:        47  QRSD:       99                           T:          29  QT:         331  QTc:        425    Interpretive Statements  Sinus rhythm  Consider right atrial enlargement  Borderline T wave abnormalities  No previous ECG available for comparison       EKG: Sinus rhythm at a rate of 100.  Normal P waves.  There is a Q-wave in lead III.  Normal ST segments.  Normal T waves.  Borderline EKG.    COURSE & MEDICAL DECISION MAKING  Pertinent Labs & Imaging studies reviewed. (See chart for details)  Patient's had hemoptysis.  Chest x-ray is unremarkable.  Given her history of DVT CT scan will be obtained to rule out thromboembolic disease.  CT of the chest showed a right sided pulmonary embolus.  Patient was given a dose Lovenox and will be admitted.  There is no sign of cor pulmonale.  [facility]      FINAL IMPRESSION  1.  Chest pain  2.  Pulmonary embolus  3.      Please note that this dictation was created using voice recognition software. I have worked with consultants from the vendor as well as technical experts from [facility] to optimize the interface. I have made every  reasonable attempt to correct obvious errors, but I expect that there are errors of grammar and possibly content that I did not discover before finalizing the note.      Electronically signed by: Paul Luna, 7/9/2018 8:02 PM

## 2018-07-10 NOTE — PROGRESS NOTES
Received report from RN; patient reports mild pain in chest when taking deep breathes. Discussed plan of care, safety to use call light, and plan to wait for orders.

## 2018-07-10 NOTE — FLOWSHEET NOTE
07/09/18 1220   Type of Assessment   Assessment Yes   Patient History   Pulmonary Diagnosis Pulmonary embolism   Surgical Procedures none   Home O2 No   Home Treatments/Frequency No   COPD Risk Screening   Do you have a history of COPD? No   COPD Population Screener   During the past 4 weeks, how much did you feel short of breath? 1   Do you ever cough up any mucus or phlegm? 0   In the past 12 months, you do less than you used to because of your breathing problems 0   Have you smoked at least 100 cigarettes in your entire life? 2   How old are you? 0   COPD Screening Score 3   Level Of Consciousness   Level of Consciousness Alert   Respiratory WDL   Respiratory (WDL) X   Chest Exam   Respiration 16   Pulse (!) 105   Breath Sounds   RUL Breath Sounds Diminished   RML Breath Sounds Diminished   RLL Breath Sounds Diminished   TYRESE Breath Sounds Clear   LLL Breath Sounds Clear;Diminished   Oximetry   #Pulse Oximetry (Single Determination) Yes   Oxygen   Pulse Oximetry 95 %   O2 Daily Delivery Respiratory  Room Air with O2 Available

## 2018-07-10 NOTE — PROGRESS NOTES
Rapid Response called; patient C/O 8/10 chest pain that does not go away. Paged Hospitalist; team arrived and stat EKG performed.     0635: MD arrived and orders placed.    0640: Patient vomited, see MAR.    0645: Patient is calm and resting in bed.

## 2018-07-10 NOTE — CARE PLAN
Problem: Safety  Goal: Will remain free from falls    Intervention: Implement fall precautions   07/10/18 0209   OTHER   Environmental Precautions Treaded Slipper Socks on Patient;Personal Belongings, Wastebasket, Call Bell etc. in Easy Reach;Report Given to Other Health Care Providers Regarding Fall Risk;Bed in Low Position;Communication Sign for Patients & Families;Mobility Assessed & Appropriate Sign Placed   IV Pole on Same Side of Bed as Bathroom Yes   Bedrails Bedrails Closest to Bathroom Down

## 2018-07-10 NOTE — PROGRESS NOTES
Patient unable to sleep; per patient she takes medications (all 3, see MAR) all together at night.

## 2018-07-10 NOTE — CARE PLAN
Problem: Communication  Goal: The ability to communicate needs accurately and effectively will improve    Intervention: [place] patient and significant other/support system to call light to alert staff of needs   07/10/18 2548   OTHER   Oriented to: All of the Following : Location of Bathroom, Visiting Policy, Unit Routine, Call Light and Bedside Controls, Bedside Rail Policy, Smoking Policy, Rights and Responsibilities, Bedside Report, and Patient Education Notebook

## 2018-07-10 NOTE — H&P
[facility] Medicine History & Physical Note    Date of Service  7/9/2018    Primary Care Physician  Hallie Norris M.D.    Consultants  none    Code Status  Full    Chief Complaint  Chest pain, hemoptysis    History of Presenting Illness  32 y.o. Female with prior h/o thromboembolic disease in 2015, previously on Xarelto who presented 7/9/2018 with chest pain since this morning, hemoptysis and shortness of breath.    5 days ago she said she woke up with a severe sore throat, apparently she was evaluated for strep and was negative, although she continued to have severe sore throat and difficulty swallowing, she's had chills and diaphoresis for about a week. She says she's felt pretty miserable, however today was different when she woke up she had substernal intense 8 out of 10 chest pain, she tried to cough to see if it would alleviate the pain but it made it worse, she coughed up blood streaked mucus. She continued to be short of breath, she still has the chest pain when she takes a deep breath. She describes it sharp and stabbing radiating to both shoulders and through to her back.    Patient has a birth control implant in her arm, she's had it for the last 2-1/2 years it's due to the replaced this fall but she realizes she will need to have it out sooner. She also smokes. She also has family history of thromboembolic disease.    Review of Systems  Review of Systems   Constitutional: Positive for chills (1 week) and diaphoresis (1 week). Negative for fever.   HENT: Positive for sore throat.         Due to thin face pain causing headaches   Eyes: Negative for pain and discharge.   Respiratory: Positive for cough, hemoptysis, sputum production and shortness of breath. Negative for wheezing.    Cardiovascular: Positive for chest pain and palpitations (chronic). Negative for leg swelling.   Gastrointestinal: Positive for nausea. Negative for abdominal pain, constipation, diarrhea and vomiting.   Genitourinary: Negative  for dysuria.   Musculoskeletal:        Denies leg cramps   Skin: Negative for itching and rash.   Neurological: Positive for headaches (due to bad tooth). Negative for dizziness.   Psychiatric/Behavioral: Negative for depression and substance abuse.        History of bipolar and PTSD-stable on meds       Past Medical History   has a past medical history of Anxiety; Bipolar 1 disorder (HCC); Depression; DVT of upper extremity (deep vein thrombosis) (HCC); GERD (gastroesophageal reflux disease); IBD (inflammatory bowel disease); PTSD (post-traumatic stress disorder); and PUD (peptic ulcer disease).    Surgical History   has a past surgical history that includes mammoplasty augmentation.     Family History  family history includes Heart Disease in her father; Other in her mother; Psychiatry in her mother; Stroke in her father. , borderline diabetes, thromboembolic disease.    Social History   reports that she has been smoking Cigarettes.  She has a 3.00 pack-year smoking history. She has never used smokeless tobacco. She reports that she drinks alcohol. She reports that she does not use drugs. , 2 children age 7 and 14, works as a hairdresser.    Allergies  Allergies   Allergen Reactions   • Nsaids      Hx ulcers   • Ondansetron        Medications  Prior to Admission Medications   Prescriptions Last Dose Informant Patient Reported? Taking?   clonazepam (KLONOPIN) 1 MG Tab unknown at Unknown time  Yes No   Sig: Take 1 mg by mouth 2 times a day as needed.   lamotrigine (LAMICTAL) 150 MG tablet 7/9/2018 at am  Yes Yes   Sig: Take 150 mg by mouth 2 Times a Day.   zolpidem (AMBIEN) 10 MG Tab unknown at Unknown time  Yes No   Sig: Take 10 mg by mouth at bedtime as needed.      Facility-Administered Medications: None       Physical Exam  Blood Pressure: 133/87   Temperature: 36.8 °C (98.3 °F)   Pulse: (!) 115   Respiration: 18   Pulse Oximetry: 92 %     Physical Exam   Constitutional: She is oriented to person, place,  and time. She appears well-developed and well-nourished. No distress.   HENT:   Head: Normocephalic and atraumatic.   Mouth/Throat: Oropharynx is clear and moist.   Her tongue has whitish coating but her pharynx appears to be clear   Eyes: Conjunctivae and EOM are normal. Pupils are equal, round, and reactive to light. Right eye exhibits no discharge. Left eye exhibits no discharge. No scleral icterus.   Neck: Neck supple.   Cardiovascular: Normal rate and regular rhythm.    Pulmonary/Chest: No respiratory distress. She has no wheezes. She has no rales. She exhibits no tenderness.   Lately decreased excursion due to pain  Persistent crackle in the right base/may be a rub   Abdominal: Soft. Bowel sounds are normal. She exhibits no distension. There is no tenderness.   Musculoskeletal: She exhibits no edema or tenderness.   Neurological: She is alert and oriented to person, place, and time. No cranial nerve deficit.   Skin: Skin is warm and dry. She is not diaphoretic.   Psychiatric: She has a normal mood and affect.   Nursing note and vitals reviewed.      Laboratory:  Recent Labs      07/09/18   1755   WBC  11.6*   RBC  4.85   HEMOGLOBIN  15.9   HEMATOCRIT  45.8   MCV  94.4   MCH  32.8   MCHC  34.7   RDW  44.2   PLATELETCT  261   MPV  10.7     Recent Labs      07/09/18   1755   SODIUM  137   POTASSIUM  3.8   CHLORIDE  106   CO2  25   GLUCOSE  79   BUN  <5*   CREATININE  0.81   CALCIUM  9.0     Recent Labs      07/09/18   1755   GLUCOSE  79                 No results found for: TROPONINI    Urinalysis:    Lab Results   Component Value Date    SPECGRAVITY 1.020 01/26/2018    GLUCOSEUR Negative 01/26/2018    KETONES Negative 01/26/2018    NITRITE Negative 01/26/2018    WBCURINE Rare 01/26/2018    RBCURINE Rare 01/26/2018    BACTERIA Negative 01/26/2018    EPITHELCELL Few 01/26/2018        Imaging:  CT-CTA CHEST PULMONARY ARTERY W/ RECONS   Final Result      Filling defect within a right upper lobe pulmonary arterial  segmental vessel consistent with pulmonary embolus. No evidence of right heart strain.      This was discussed with ELOISE ALSTON at 7:12 PM on 7/9/2018.      DX-CHEST-LIMITED (1 VIEW)   Final Result      No evidence of acute cardiopulmonary process.            Assessment/Plan:  I anticipate this patient is appropriate for observation status at this time.    Pharyngitis   Assessment & Plan    cepacol        Tobacco use disorder   Assessment & Plan    Importance of smoking cessation discussed, 4  Minutes  Patient realizes she needs to quit, spouse also smokes and she knows this will make it more challenging  Nicotine replacement ordered        Hypercoagulable state (HCC)   Assessment & Plan    + prior personal event and [facility]  Discussed risks, smoking        Pulmonary embolism (HCC)   Assessment & Plan    This is recurrent event  Last event 2015  lovenox, ? xarelto        Bipolar 1 disorder (HCC)- (present on admission)   Assessment & Plan    Continue home meds            VTE prophylaxis: on Full dose Lovenox

## 2018-07-10 NOTE — RESPIRATORY CARE
Respiratory Rapid Response Note      Events/Summary/Plan: Rapid response was called for chest pain. RRT and MD in the room. Placed pt on 2L NC tolerating well. EKG, blood work and chest xray was ordered.

## 2018-07-10 NOTE — RESPIRATORY CARE
"COPD Education by COPD Clinical Educator  7/10/2018 at 10:30 AM by Jimena Choudhary    Patient interviewed by COPD education team.  Patient refused full COPD program at this time, but agreed to short intervention.  A comprehensive packet including information about smoking cessation given.    Smoking Cessation Intervention >10 minutes completed.  Provided smoking cessation packet with \"Tips to Quit\" and flyer for \"Free Smoking Cessation Classes\"    "

## 2018-07-10 NOTE — DISCHARGE PLANNING
SW received pt's rx for xarelto.  NIGEL faxed both prescriptions to [facility] off of [address].

## 2018-07-10 NOTE — ASSESSMENT & PLAN NOTE
Importance of smoking cessation discussed, 4  Minutes  Patient realizes she needs to quit, spouse also smokes and she knows this will make it more challenging  Nicotine replacement ordered

## 2018-07-10 NOTE — CARE PLAN
Problem: Pain Management  Goal: Pain level will decrease to patient's comfort goal  Outcome: PROGRESSING AS EXPECTED   07/10/18 0208   OTHER   Non Verbal Scale  Calm   Comfort Goal Comfort at Rest;Comfort with Movement;Sleep Comfortably     Patient sleeping comfortably.

## 2018-07-10 NOTE — ED NOTES
Pt transported to [facility] via gurney on monitor by CAIT Oliver. Bedside report to be given. Pt transported with family and all belongings.

## 2018-07-11 LAB
S PYO SPEC QL CULT: NORMAL
SIGNIFICANT IND 70042: NORMAL
SITE SITE: NORMAL
SOURCE SOURCE: NORMAL

## 2018-07-11 NOTE — PROGRESS NOTES
Sl dc'd site intact  Pt given dc inst and has rx's for blood thinner  Agrees to  at her pharmacy on file  Pt dc'd home family at bedside

## 2018-07-11 NOTE — DISCHARGE SUMMARY
Discharge Summary    CHIEF COMPLAINT ON ADMISSION  Chief Complaint   Patient presents with   • Sore Throat     sore throat x 5 days  cough started today with some streaks of blood in it  Also has chest pain       Reason for Admission  Bloody cough; sore throat     Admission Date  7/9/2018    CODE STATUS  Full Code    HPI & HOSPITAL COURSE  This is a 32 y.o. female here with chest pain and hemoptysis. She was found to have an acute pulmonary embolus.    She was admitted and observed. Her CT results are noted below. She improved through her hospital course and was oplaced onto xarelto which she has tolerated in the past. Of note, this patient smokes, has a history of a dvt associated with an IV in her upper arm, has a family history of clotting she says in her father and has a recently placed implantable birth control device in her arm. This will need to be removed and she likely will benefit from a hypercoagulable workup in the future wiht her PCP.      Therefore, she is discharged in good and stable condition to home with close outpatient follow-up.    cta chest:  Filling defect within a right upper lobe pulmonary arterial segmental vessel consistent with pulmonary embolus. No evidence of right heart strain.        Discharge Date  7-10-18    FOLLOW UP ITEMS POST DISCHARGE  pcp- hypercoag labs.     DISCHARGE DIAGNOSES  Active Problems:    Bipolar 1 disorder (HCC) POA: Yes    Pulmonary embolism (HCC) POA: Unknown    Hypercoagulable state (HCC) POA: Unknown    Tobacco use disorder POA: Unknown    Pharyngitis POA: Unknown  Resolved Problems:    * No resolved hospital problems. *      FOLLOW UP  Future Appointments  Date Time Provider Department Center   8/9/2018 1:00 PM Hallie Norris M.D. [facility]     No follow-up provider specified.    MEDICATIONS ON DISCHARGE     Medication List      START taking these medications      Instructions   * rivaroxaban 15 MG Tabs tablet  Commonly known as:  XARELTO   Take 1 Tab by  mouth 2 Times a Day.  Dose:  15 mg     * rivaroxaban 20 MG Tabs tablet  Start taking on:  7/31/2018  Commonly known as:  XARELTO   Take 1 Tab by mouth every day.  Dose:  20 mg        * This list has 2 medication(s) that are the same as other medications prescribed for you. Read the directions carefully, and ask your doctor or other care provider to review them with you.            CONTINUE taking these medications      Instructions   clonazePAM 1 MG Tabs  Commonly known as:  KLONOPIN   Take 1 mg by mouth 2 times a day as needed.  Dose:  1 mg     LAMICTAL 150 MG tablet  Generic drug:  lamotrigine   Take 150 mg by mouth 2 Times a Day.  Dose:  150 mg     zolpidem 10 MG Tabs  Commonly known as:  AMBIEN   Take 10 mg by mouth at bedtime as needed.  Dose:  10 mg            Allergies  Allergies   Allergen Reactions   • Nsaids      Hx ulcers   • Ondansetron        DIET  Orders Placed This Encounter   Procedures   • Diet Order Regular     Standing Status:   Standing     Number of Occurrences:   1     Order Specific Question:   Diet:     Answer:   Regular [1]       ACTIVITY  As tolerated.  Weight bearing as tolerated    CONSULTATIONS  none    PROCEDURES  none    LABORATORY  Lab Results   Component Value Date    SODIUM 139 07/10/2018    POTASSIUM 3.8 07/10/2018    CHLORIDE 107 07/10/2018    CO2 24 07/10/2018    GLUCOSE 101 (H) 07/10/2018    BUN 5 (L) 07/10/2018    CREATININE 0.72 07/10/2018        Lab Results   Component Value Date    WBC 11.1 (H) 07/10/2018    HEMOGLOBIN 15.8 07/10/2018    HEMATOCRIT 45.9 07/10/2018    PLATELETCT 247 07/10/2018        Total time of the discharge process exceeds 35 minutes.

## 2018-07-11 NOTE — DISCHARGE INSTRUCTIONS
Discharge Instructions    Discharged to home by car with relative. Discharged via wheelchair, hospital escort: Yes.  Special equipment needed: Not Applicable    Be sure to schedule a follow-up appointment with your primary care doctor or any specialists as instructed.     Discharge Plan:   Diet Plan: Discussed  Activity Level: Discussed  Smoking Cessation Offered: Patient Counseled  Confirmed Follow up Appointment: Patient to Call and Schedule Appointment  Medication Reconciliation Updated: Yes    I understand that a diet low in cholesterol, fat, and sodium is recommended for good health. Unless I have been given specific instructions below for another diet, I accept this instruction as my diet prescription.   Other diet: diet as tolerated    }  · Is patient discharged on Warfarin / Coumadin?   No     Pulmonary Embolism  A pulmonary embolism (PE) is a sudden blockage or decrease of blood flow in one lung or both lungs. Most blockages come from a blood clot that travels from the legs or the pelvis to the lungs. PE is a dangerous and potentially life-threatening condition if it is not treated right away.  What are the causes?  A pulmonary embolism occurs most commonly when a blood clot travels from one of your veins to your lungs. Rarely, PE is caused by air, fat, amniotic fluid, or part of a tumor traveling through your veins to your lungs.  What increases the risk?  A PE is more likely to develop in:  · People who smoke.  · People who are older, especially over 60 years of age.  · People who are overweight (obese).  · People who sit or lie still for a long time, such as during long-distance travel (over 4 hours), bed rest, hospitalization, or during recovery from certain medical conditions like a stroke.  · People who do not engage in much physical activity (sedentary lifestyle).  · People who have chronic breathing disorders.  · People who have a personal or family history of blood clots or blood clotting  disease.  · People who have peripheral vascular disease (PVD), diabetes, or some types of cancer.  · People who have heart disease, especially if the person had a recent heart attack or has congestive heart failure.  · People who have neurological diseases that affect the legs (leg paresis).  · People who have had a traumatic injury, such as breaking a hip or leg.  · People who have recently had major or lengthy surgery, especially on the hip, knee, or abdomen.  · People who have had a central line placed inside a large vein.  · People who take medicines that contain the hormone estrogen. These include birth control pills and hormone replacement therapy.  · Pregnancy or during childbirth or the postpartum period.  What are the signs or symptoms?  The symptoms of a PE usually start suddenly and include:  · Shortness of breath while active or at rest.  · Coughing or coughing up blood or blood-tinged mucus.  · Chest pain that is often worse with deep breaths.  · Rapid or irregular heartbeat.  · Feeling light-headed or dizzy.  · Fainting.  · Feeling anxious.  · Sweating.  There may also be pain and swelling in a leg if that is where the blood clot started.  These symptoms may represent a serious problem that is an emergency. Do not wait to see if the symptoms will go away. Get medical help right away. Call your local emergency services (911 in the [place] Do not drive yourself to the hospital.   How is this diagnosed?  Your health care provider will take a medical history and perform a physical exam. You may also have other tests, including:  · Blood tests to assess the clotting properties of your blood, assess oxygen levels in your blood, and find blood clots.  · Imaging tests, such as CT, ultrasound, MRI, X-ray, and other tests to see if you have clots anywhere in your body.  · An electrocardiogram (ECG) to look for heart strain from blood clots in the lungs.  How is this treated?  The main goals of PE treatment  are:  · To stop a blood clot from growing larger.  · To stop new blood clots from forming.  The type of treatment that you receive depends on many factors, such as the cause of your PE, your risk for bleeding or developing more clots, and other medical conditions that you have. Sometimes, a combination of treatments is necessary.  This condition may be treated with:  · Medicines, including newer oral blood thinners (anticoagulants), warfarin, low molecular weight heparins, thrombolytics, or heparins.  · Wearing compression stockings or using different types of devices.  · Surgery (rare) to remove the blood clot or to place a filter in your abdomen to stop the blood clot from traveling to your lungs.  Treatments for a PE are often divided into immediate treatment, long-term treatment (up to 3 months after PE), and extended treatment (more than 3 months after PE). Your treatment may continue for several months. This is called maintenance therapy, and it is used to prevent the forming of new blood clots. You can work with your health care provider to choose the treatment program that is best for you.  What are anticoagulants?   Anticoagulants are medicines that treat PEs. They can stop current blood clots from growing and stop new clots from forming. They cannot dissolve existing clots. Your body dissolves clots by itself over time. Anticoagulants are given by mouth, by injection, or through an IV tube.  What are thrombolytics?   Thrombolytics are clot-dissolving medicines that are used to dissolve a PE. They carry a high risk of bleeding, so they tend to be used only in severe cases or if you have very low blood pressure.  Follow these instructions at home:  If you are taking a newer oral anticoagulant:  · Take the medicine every single day at the same time each day.  · Understand what foods and drugs interact with this medicine.  · Understand that there are no regular blood tests required when using this  medicine.  · Understand the side effects of this medicine, including excessive bruising or bleeding. Ask your health care provider or pharmacist about other possible side effects.  If you are taking warfarin:  · Understand how to take warfarin and know which foods can affect how warfarin works in your body.  · Understand that it is dangerous to take too much or too little warfarin. Too much warfarin increases the risk of bleeding. Too little warfarin continues to allow the risk for blood clots.  · Follow your PT and INR blood testing schedule. The PT and INR results allow your health care provider to adjust your dose of warfarin. It is very important that you have your PT and INR tested as often as told by your health care provider.  · Avoid major changes in your diet, or tell your health care provider before you change your diet. Arrange a visit with a registered dietitian to answer your questions. Many foods, especially foods that are high in vitamin K, can interfere with warfarin and affect the PT and INR results. Eat a consistent amount of foods that are high in vitamin K, such as:  ¨ Spinach, kale, broccoli, cabbage, clau greens, turnip greens, [place] sprouts, peas, cauliflower, seaweed, and parsley.  ¨ Beef liver and pork liver.  ¨ Green tea.  ¨ Soybean oil.  · Tell your health care provider about any and all medicines, vitamins, and supplements that you take, including aspirin and other over-the-counter anti-inflammatory medicines. Be especially cautious with aspirin and anti-inflammatory medicines. Do not take those before you ask your health care provider if it is safe to do so. This is important because many medicines can interfere with warfarin and affect the PT and INR results.  · Do not start or stop taking any over-the-counter or prescription medicine unless your health care provider or pharmacist tells you to do so.  If you take warfarin, you will also need to do these things:  · Hold pressure  over cuts for longer than usual.  · Tell your dentist and other health care providers that you are taking warfarin before you have any procedures in which bleeding may occur.  · Avoid alcohol or drink very small amounts. Tell your health care provider if you change your alcohol intake.  · Do not use tobacco products, including cigarettes, chewing tobacco, and e-cigarettes. If you need help quitting, ask your health care provider.  · Avoid contact sports.  General instructions  · Take over-the-counter and prescription medicines only as told by your health care provider. Anticoagulant medicines can have side effects, including easy bruising and difficulty stopping bleeding. If you are prescribed an anticoagulant, you will also need to do these things:  ¨ Hold pressure over cuts for longer than usual.  ¨ Tell your dentist and other health care providers that you are taking anticoagulants before you have any procedures in which bleeding may occur.  ¨ Avoid contact sports.  · Wear a medical alert bracelet or carry a medical alert card that says you have had a PE.  · Ask your health care provider how soon you can go back to your normal activities. Stay active to prevent new blood clots from forming.  · Make sure to exercise while traveling or when you have been sitting or standing for a long period of time. It is very important to exercise. Exercise your legs by walking or by tightening and relaxing your leg muscles often. Take frequent walks.  · Wear compression stockings as told by your health care provider to help prevent more blood clots from forming.  · Do not use tobacco products, including cigarettes, chewing tobacco, and e-cigarettes. If you need help quitting, ask your health care provider.  · Keep all follow-up appointments with your health care provider. This is important.  How is this prevented?  Take these actions to decrease your risk of developing another PE:  · Exercise regularly. For at least 30 minutes  every day, engage in:  ¨ Activity that involves moving your arms and legs.  ¨ Activity that encourages good blood flow through your body by increasing your heart rate.  · Exercise your arms and legs every hour during long-distance travel (over 4 hours). Drink plenty of water and avoid drinking alcohol while traveling.  · Avoid sitting or lying in bed for long periods of time without moving your legs.  · Maintain a weight that is appropriate for your height. Ask your health care provider what weight is healthy for you.  · If you are a woman who is over 35 years of age, avoid unnecessary use of medicines that contain estrogen. These include birth control pills.  · Do not smoke, especially if you take estrogen medicines. If you need help quitting, ask your health care provider.  · If you are at very high risk for PE, wear compression stockings.  · If you recently had a PE, have regularly scheduled ultrasound testing on your legs to check for new blood clots.  If you are hospitalized, prevention measures may include:  · Early walking after surgery, as soon as your health care provider says that it is safe.  · Receiving anticoagulants to prevent blood clots. If you cannot take anticoagulants, other options may be available, such as wearing compression stockings or using different types of devices.  Get help right away if:  · You have new or increased pain, swelling, or redness in an arm or leg.  · You have numbness or tingling in an arm or leg.  · You have shortness of breath while active or at rest.  · You have chest pain.  · You have a rapid or irregular heartbeat.  · You feel light-headed or dizzy.  · You cough up blood.  · You notice blood in your vomit, bowel movement, or urine.  · You have a fever.  These symptoms may represent a serious problem that is an emergency. Do not wait to see if the symptoms will go away. Get medical help right away. Call your local emergency services (911 in the [place] Do not drive  yourself to the hospital.   This information is not intended to replace advice given to you by your health care provider. Make sure you discuss any questions you have with your health care provider.  Document Released: 12/15/2001 Document Revised: 05/25/2017 Document Reviewed: 04/13/2016  [employer] Interactive Patient Education © 2017 [employer]      Depression / Suicide Risk    As [facility] are [facility], it is important to learn how to keep safe from harming yourself.    Recognize the warning signs:  · Abrupt changes in personality, positive or negative- including increase in energy   · Giving away possessions  · Change in eating patterns- significant weight changes-  positive or negative  · Change in sleeping patterns- unable to sleep or sleeping all the time   · Unwillingness or inability to communicate  · Depression  · Unusual sadness, discouragement and loneliness  · Talk of wanting to die  · Neglect of personal appearance   · Rebelliousness- reckless behavior  · Withdrawal from people/activities they love  · Confusion- inability to concentrate     If you or a loved one observes any of these behaviors or has concerns about self-harm, here's what you can do:  · Talk about it- your feelings and reasons for harming yourself  · Remove any means that you might use to hurt yourself (examples: pills, rope, extension cords, firearm)  · Get professional help from the community (Mental Health, Substance Abuse, psychological counseling)  · Do not be alone:Call your Safe Contact- someone whom you trust who will be there for you.  · Call your local CRISIS HOTLINE 171-3733 or 914-347-3166  · Call your local Children's Mobile Crisis Response Team [place] (458) 422-0019 or www.[employer]  · Call the toll free National Suicide Prevention Hotlines   · National Suicide Prevention Lifeline 754-275-OBZC (2400)  · National Hope Line Network 800-SUICIDE (731-1266)

## 2018-07-24 ENCOUNTER — OFFICE VISIT (OUTPATIENT)
Dept: MEDICAL GROUP | Facility: MEDICAL CENTER | Age: 33
End: 2018-07-24
Payer: COMMERCIAL

## 2018-07-24 VITALS
SYSTOLIC BLOOD PRESSURE: 110 MMHG | OXYGEN SATURATION: 98 % | TEMPERATURE: 99.3 F | HEIGHT: 61 IN | WEIGHT: 179 LBS | DIASTOLIC BLOOD PRESSURE: 90 MMHG | HEART RATE: 117 BPM | BODY MASS INDEX: 33.79 KG/M2

## 2018-07-24 DIAGNOSIS — I26.99 OTHER ACUTE PULMONARY EMBOLISM WITHOUT ACUTE COR PULMONALE (HCC): ICD-10-CM

## 2018-07-24 DIAGNOSIS — Z86.718 HISTORY OF DVT IN ADULTHOOD: ICD-10-CM

## 2018-07-24 DIAGNOSIS — D68.59 HYPERCOAGULABLE STATE (HCC): ICD-10-CM

## 2018-07-24 DIAGNOSIS — J40 BRONCHITIS: ICD-10-CM

## 2018-07-24 DIAGNOSIS — Z83.2 FAMILY HISTORY OF HYPERCOAGULABLE STATE: ICD-10-CM

## 2018-07-24 PROCEDURE — 99214 OFFICE O/P EST MOD 30 MIN: CPT | Performed by: NURSE PRACTITIONER

## 2018-07-24 RX ORDER — AZITHROMYCIN 250 MG/1
TABLET, FILM COATED ORAL
Qty: 6 TAB | Refills: 0 | Status: SHIPPED | OUTPATIENT
Start: 2018-07-24 | End: 2018-07-29

## 2018-07-25 NOTE — PROGRESS NOTES
Subjective:     Kate Shelton is a 32 y.o. female who presents with pulmonary emboli.    HPI:   Seen in f/u for PE.  She went to ER for sore throat.  She was coughing up green secretions with blood.  They did a CTA that showed PE in rt upper lobe.  No sx of LE DVT.  No pain in leg.  She was having chest pain.    She has a FH of her father with multiple CVA'S starting at 34 yrs agol.    She has had left arm DVT w/o PE at age 29.     She is now on xarelto.  Never had a coag workup.    Her original DVT was with .  She is on explanon.  It was placed right after her 1st DVT.  She is aware that she will need to be off all HRT.  Will need GYN referral for removal.  She is having difficulty getting a full breath.  No further blood in sputum.  ]  She has had green secretions for the last month.  No headache, sinus pressure or ear pressure.  No fever, chills or sweating.      Patient Active Problem List    Diagnosis Date Noted   • Pulmonary embolism (HCC) 07/09/2018   • Hypercoagulable state (HCC) 07/09/2018   • Tobacco use disorder 07/09/2018   • Pharyngitis 07/09/2018   • Bilious vomiting with nausea 01/26/2018   • Dysuria 01/26/2018   • Obesity (BMI 30-39.9) 01/26/2018   • Acute vaginitis 01/26/2018   • Bipolar 1 disorder (HCC) 01/26/2018   • ERICA (generalized anxiety disorder) 01/26/2018   • PUD (peptic ulcer disease) 01/26/2018       Current medicines (including changes today)  Current Outpatient Prescriptions   Medication Sig Dispense Refill   • azithromycin (ZITHROMAX) 250 MG Tab 2 tabs by mouth day 1, 1 tab by mouth days 2-5 6 Tab 0   • rivaroxaban (XARELTO) 15 MG Tab tablet Take 1 Tab by mouth 2 Times a Day. 42 Tab 0   • [START ON 7/31/2018] rivaroxaban (XARELTO) 20 MG Tab tablet Take 1 Tab by mouth every day. 30 Tab 3   • lamotrigine (LAMICTAL) 150 MG tablet Take 150 mg by mouth 2 Times a Day.     • clonazepam (KLONOPIN) 1 MG Tab Take 1 mg by mouth 2 times a day as needed.  0   • zolpidem (AMBIEN) 10 MG  "Tab Take 10 mg by mouth at bedtime as needed.  0     No current facility-administered medications for this visit.        Allergies   Allergen Reactions   • Nsaids      Hx ulcers   • Ondansetron        ROS  Constitutional: Negative. Negative for fever, chills, weight loss, malaise/fatigue and diaphoresis.   HENT: Negative. Negative for hearing loss, ear pain, nosebleeds, congestion, sore throat, neck pain, tinnitus and ear discharge.   Respiratory: Negative. Negative for cough, hemoptysis, sputum production, shortness of breath, wheezing and stridor.   Cardiovascular: Negative. Negative for chest pain, palpitations, orthopnea, claudication, leg swelling and PND.   Gastrointestinal: Denies nausea, vomiting, diarrhea, constipation, heartburn, melena or hematochezia.  Genitourinary: Denies dysuria, hematuria, urinary incontinence, frequency or urgency.        Objective:     Blood pressure 110/90, pulse (!) 117, temperature 37.4 °C (99.3 °F), height 1.549 m (5' 1\"), weight 81.2 kg (179 lb), SpO2 98 %, not currently breastfeeding. Body mass index is 33.82 kg/m².    Physical Exam:  Vitals reviewed.  Constitutional: Oriented to person, place, and time. appears well-developed and well-nourished. No distress.   Cardiovascular: Normal rate, regular rhythm, normal heart sounds and intact distal pulses. Exam reveals no gallop and no friction rub. No murmur heard. No carotid bruits.   Pulmonary/Chest: Effort normal and breath sounds normal. No stridor. No respiratory distress. no wheezes or rales. exhibits no tenderness.   Musculoskeletal: Normal range of motion. exhibits no edema. hiwot pedal pulses 2+.  Lymphadenopathy: No cervical or supraclavicular adenopathy.   Neurological: Alert and oriented to person, place, and time. exhibits normal muscle tone.  Skin: Skin is warm and dry. No diaphoresis.   Psychiatric: Normal mood and affect. Behavior is normal.      Assessment and Plan:     The following treatment plan was " discussed:    1. Other acute pulmonary embolism without acute cor pulmonale (HCC)  REFERRAL TO GYNECOLOGY    REFERRAL TO HEMATOLOGY ONCOLOGY Referral to? Other    she is on xarelto.  will continue.  refer hematology for eval.  may have tooth extraction as long as not hold anticoagulation   2. History of DVT in adulthood  REFERRAL TO GYNECOLOGY    REFERRAL TO HEMATOLOGY ONCOLOGY Referral to? Other    she had a left arm DVT several years ago.  no PE with that.    3. Family history of hypercoagulable state  REFERRAL TO GYNECOLOGY    REFERRAL TO HEMATOLOGY ONCOLOGY Referral to? Other    father with multiple strokes.  ? what type coag he has.  they do not communicate   4. Hypercoagulable state (HCC)  REFERRAL TO GYNECOLOGY    REFERRAL TO HEMATOLOGY ONCOLOGY Referral to? Other   5. Bronchitis  azithromycin (ZITHROMAX) 250 MG Tab    prob r/t her PE.  use zpak.  f/u if sx not imrpoved witht x   6. BMI 33.0-33.9,adult  Patient identified as having weight management issue.  Appropriate orders and counseling given.         Followup: Return in about 4 weeks (around 8/21/2018).

## 2018-07-31 ENCOUNTER — OFFICE VISIT (OUTPATIENT)
Dept: HEMATOLOGY ONCOLOGY | Facility: MEDICAL CENTER | Age: 33
End: 2018-07-31
Payer: COMMERCIAL

## 2018-07-31 VITALS
OXYGEN SATURATION: 96 % | TEMPERATURE: 98.6 F | RESPIRATION RATE: 14 BRPM | SYSTOLIC BLOOD PRESSURE: 112 MMHG | WEIGHT: 176.15 LBS | BODY MASS INDEX: 33.26 KG/M2 | HEART RATE: 98 BPM | HEIGHT: 61 IN | DIASTOLIC BLOOD PRESSURE: 80 MMHG

## 2018-07-31 DIAGNOSIS — I26.99 OTHER ACUTE PULMONARY EMBOLISM WITHOUT ACUTE COR PULMONALE (HCC): ICD-10-CM

## 2018-07-31 PROCEDURE — 99204 OFFICE O/P NEW MOD 45 MIN: CPT | Performed by: INTERNAL MEDICINE

## 2018-07-31 RX ORDER — AMOXICILLIN 500 MG/1
500 CAPSULE ORAL 3 TIMES DAILY
COMMUNITY
End: 2018-08-14

## 2018-07-31 RX ORDER — OXYCODONE HYDROCHLORIDE AND ACETAMINOPHEN 325; 5 MG/5ML; MG/5ML
5 SOLUTION ORAL EVERY 4 HOURS PRN
COMMUNITY
End: 2018-08-14

## 2018-07-31 ASSESSMENT — PAIN SCALES - GENERAL: PAINLEVEL: NO PAIN

## 2018-07-31 NOTE — PROGRESS NOTES
Consult Note: Hematology    Date of consultation: 7/31/2018     Referring provider: Monet Blanton A.P.N.    Reason for consultation:   CC: PE    History of presenting illness:   First seen in on 7/31/2018  Kate Shelton  is a 32 y.o. year old female clinic today for evaluation of anticoagulation.  Patient states she had an extensive DVT in her left upper extremity after placement of a peripheral IV in 2016.  She had 4 months of anticoagulation at the time.  She was recently admitted to the hospital in July 2018 with hemoptysis and shortness of breath.  She was found to have a unprovoked pulmonary embolism and started on Xarelto.  The patient is seen in hematology to evaluate for duration of anticoagulation.      Review of Systems:  Constitutional: No fever, chills, weight loss ,malaise/fatigue.      All other review of systems are negative except what was mentioned above in the HPI.    Past Medical History:    Past Medical History:   Diagnosis Date   • Anxiety    • Bipolar 1 disorder (HCC)    • Depression    • DVT of upper extremity (deep vein thrombosis) (HCC)     From IV   • DVT, lower extremity, recurrent (HCC)     hx left arm DVT at age 29   • GERD (gastroesophageal reflux disease)    • IBD (inflammatory bowel disease)    • PTSD (post-traumatic stress disorder)    • PUD (peptic ulcer disease)        Past surgical history:    Past Surgical History:   Procedure Laterality Date   • MAMMOPLASTY AUGMENTATION         Allergies:  Nsaids and Ondansetron    Medications:    Current Outpatient Prescriptions   Medication Sig Dispense Refill   • oxyCODONE-acetaminophen (ROXICET) 5-325 MG/5ML Solution Take 5 mL by mouth every four hours as needed.     • amoxicillin (AMOXIL) 500 MG Cap Take 500 mg by mouth 3 times a day.     • rivaroxaban (XARELTO) 15 MG Tab tablet Take 1 Tab by mouth 2 Times a Day. 42 Tab 0   • rivaroxaban (XARELTO) 20 MG Tab tablet Take 1 Tab by mouth every day. 30 Tab 3   • lamotrigine (LAMICTAL) 150 MG  "tablet Take 150 mg by mouth 2 Times a Day.     • clonazepam (KLONOPIN) 1 MG Tab Take 1 mg by mouth 2 times a day as needed.  0   • zolpidem (AMBIEN) 10 MG Tab Take 10 mg by mouth at bedtime as needed.  0     No current facility-administered medications for this visit.        Social History:     Social History     Social History   • Marital status:      Spouse name: N/A   • Number of children: N/A   • Years of education: N/A     Occupational History   • Not on file.     Social History Main Topics   • Smoking status: Current Every Day Smoker     Packs/day: 0.50     Years: 6.00     Types: Cigarettes   • Smokeless tobacco: Never Used   • Alcohol use Yes      Comment: occasionally   • Drug use: No   • Sexual activity: Yes     Partners: Male     Birth control/ protection: Implant     Other Topics Concern   • Not on file     Social History Narrative   • No narrative on file       Family History:     Family History   Problem Relation Age of Onset   • Psychiatry Mother    • Other Mother         killed in [place]   • Heart Disease Father    • Stroke Father        Physical Exam:  Vitals:   /80   Pulse 98   Temp 37 °C (98.6 °F)   Resp 14   Ht 1.549 m (5' 1\")   Wt 79.9 kg (176 lb 2.4 oz)   SpO2 96%   BMI 33.28 kg/m²     General: Not in acute distress,   HEENT: No pallor, icterus. Oropharynx clear.   Neck: Supple, no palpable masses.  Lymph nodes: No palpable cervical, supraclavicular, axillary or inguinal lymphadenopathy.    CVS: regular rate and rhythm, no rubs or gallops  RESP: Clear to auscultate bilaterally, no wheezing or crackles.   ABD: Soft, non tender, non distended, positive bowel sounds, no palpable organomegaly  EXT: No edema or cyanosis  CNS: Alert and oriented x3, No focal deficits.  Skin- No rash      Labs:    WBC 07/09/2018 11.6* 4.8 - 10.8 K/uL Final   • RBC 07/09/2018 4.85  4.20 - 5.40 M/uL Final   • Hemoglobin 07/09/2018 15.9  12.0 - 16.0 g/dL Final   • Hematocrit 07/09/2018 45.8  37.0 - 47.0 " % Final   • MCV 07/09/2018 94.4  81.4 - 97.8 fL Final   • MCH 07/09/2018 32.8  27.0 - 33.0 pg Final   • MCHC 07/09/2018 34.7  33.6 - 35.0 g/dL Final   • RDW 07/09/2018 44.2  35.9 - 50.0 fL Final   • Platelet Count 07/09/2018 261  164 - 446 K/uL Final   • MPV 07/09/2018 10.7  9.0 - 12.9 fL Final   • Neutrophils-Polys 07/09/2018 68.60  44.00 - 72.00 % Final   • Lymphocytes 07/09/2018 22.50  22.00 - 41.00 % Final   • Monocytes 07/09/2018 7.20  0.00 - 13.40 % Final   • Eosinophils 07/09/2018 1.10  0.00 - 6.90 % Final   • Basophils 07/09/2018 0.30  0.00 - 1.80 % Final   • Immature Granulocytes 07/09/2018 0.30  0.00 - 0.90 % Final   • Nucleated RBC 07/09/2018 0.00  /100 WBC Final   • Neutrophils (Absolute) 07/09/2018 7.94* 2.00 - 7.15 K/uL Final    Includes immature neutrophils, if present.   • Lymphs (Absolute) 07/09/2018 2.61  1.00 - 4.80 K/uL Final   • Monos (Absolute) 07/09/2018 0.83  0.00 - 0.85 K/uL Final   • Eos (Absolute) 07/09/2018 0.13  0.00 - 0.51 K/uL Final   • Baso (Absolute) 07/09/2018 0.03  0.00 - 0.12 K/uL Final   • Immature Granulocytes (abs) 07/09/2018 0.04  0.00 - 0.11 K/uL Final   • NRBC (Absolute) 07/09/2018 0.00  K/uL Final   • Sodium 07/09/2018 137  135 - 145 mmol/L Final   • Potassium 07/09/2018 3.8  3.6 - 5.5 mmol/L Final   • Chloride 07/09/2018 106  96 - 112 mmol/L Final   • Co2 07/09/2018 25  20 - 33 mmol/L Final   • Glucose 07/09/2018 79  65 - 99 mg/dL Final   • Bun 07/09/2018 <5* 8 - 22 mg/dL Final   • Creatinine 07/09/2018 0.81  0.50 - 1.40 mg/dL Final   • Calcium 07/09/2018 9.0  8.4 - 10.2 mg/dL Final   • Anion Gap 07/09/2018 6.0  0.0 - 11.9 Final   • Beta-Hcg Qualitative Serum 07/09/2018 Negative  Negative Final   • GFR If  07/09/2018 >60  >60 mL/min/1.73 m 2 Final   • GFR If Non  07/09/2018 >60  >60 mL/min/1.73 m 2 Final   • Sodium 07/10/2018 139  135 - 145 mmol/L Final   • Potassium 07/10/2018 3.8  3.6 - 5.5 mmol/L Final   • Chloride 07/10/2018 107  96 -  112 mmol/L Final   • Co2 07/10/2018 24  20 - 33 mmol/L Final   • Anion Gap 07/10/2018 8.0  0.0 - 11.9 Final   • Glucose 07/10/2018 101* 65 - 99 mg/dL Final   • Bun 07/10/2018 5* 8 - 22 mg/dL Final   • Creatinine 07/10/2018 0.72  0.50 - 1.40 mg/dL Final   • Calcium 07/10/2018 8.9  8.4 - 10.2 mg/dL Final   • AST(SGOT) 07/10/2018 15  12 - 45 U/L Final   • ALT(SGPT) 07/10/2018 11  2 - 50 U/L Final   • Alkaline Phosphatase 07/10/2018 73  30 - 99 U/L Final   • Total Bilirubin 07/10/2018 0.8  0.1 - 1.5 mg/dL Final   • Albumin 07/10/2018 4.7  3.2 - 4.9 g/dL Final   • Total Protein 07/10/2018 7.3  6.0 - 8.2 g/dL Final   • Globulin 07/10/2018 2.6  1.9 - 3.5 g/dL Final   • A-G Ratio 07/10/2018 1.8  g/dL Final   • WBC 07/10/2018 11.1* 4.8 - 10.8 K/uL Final   • RBC 07/10/2018 4.91  4.20 - 5.40 M/uL Final   • Hemoglobin 07/10/2018 15.8  12.0 - 16.0 g/dL Final   • Hematocrit 07/10/2018 45.9  37.0 - 47.0 % Final   • MCV 07/10/2018 93.5  81.4 - 97.8 fL Final   • MCH 07/10/2018 32.2  27.0 - 33.0 pg Final   • MCHC 07/10/2018 34.4  33.6 - 35.0 g/dL Final   • RDW 07/10/2018 44.4  35.9 - 50.0 fL Final   • Platelet Count 07/10/2018 247  164 - 446 K/uL Final   • MPV 07/10/2018 10.7  9.0 - 12.9 fL Final   • Neutrophils-Polys 07/10/2018 60.10  44.00 - 72.00 % Final   • Lymphocytes 07/10/2018 29.80  22.00 - 41.00 % Final   • Monocytes 07/10/2018 8.50  0.00 - 13.40 % Final   • Eosinophils 07/10/2018 1.10  0.00 - 6.90 % Final   • Basophils 07/10/2018 0.20  0.00 - 1.80 % Final   • Immature Granulocytes 07/10/2018 0.30  0.00 - 0.90 % Final   • Nucleated RBC 07/10/2018 0.00  /100 WBC Final   • Neutrophils (Absolute) 07/10/2018 6.70  2.00 - 7.15 K/uL Final    Includes immature neutrophils, if present.   • Lymphs (Absolute) 07/10/2018 3.32  1.00 - 4.80 K/uL Final   • Monos (Absolute) 07/10/2018 0.95* 0.00 - 0.85 K/uL Final   • Eos (Absolute) 07/10/2018 0.12  0.00 - 0.51 K/uL Final   • Baso (Absolute) 07/10/2018 0.02  0.00 - 0.12 K/uL Final   •  Immature Granulocytes (abs) 07/10/2018 0.03  0.00 - 0.11 K/uL Final   • NRBC (Absolute) 07/10/2018 0.00  K/uL Final    Report 07/10/2018    Final       Troponin I 07/10/2018 <0.02  0.00 - 0.04 ng/mL Final    Comment: Effective 2011, the reference range for positive Troponin  has been changed.  This change follows the recommendation of  the American College of Cardiology (ACC) committee in  conjunction with the 99th percentile reference population.  ____________________________________________________________  Normal TNI: 0.00 - 0.04 ng/mL  Clinical Correlation Indicated:  0.05 - 0.50 ng/mL  Suggestive of MI:  >0.50 ng/mL     • GFR If  07/10/2018 >60  >60 mL/min/1.73 m 2 Final   • GFR If Non  07/10/2018 >60  >60 mL/min/1.73 m 2 Final         Imagin2018 6:37 PM    HISTORY/REASON FOR EXAM:  Chest pain and hemoptysis.      TECHNIQUE/EXAM DESCRIPTION:  CT angiogram scan for pulmonary embolism with contrast, with reconstructions.    1.25 mm helical sections were obtained from the lung apices through the lung bases following the rapid bolus administration of 70 mL of Omnipaque 350 nonionic contrast. Thin-section overlapping reconstruction interval was utilized. Coronal   reconstructions were generated from the axial data. MIP post processing was performed and utilized for the interpretation.    Low dose optimization technique was utilized for this CT exam including automated exposure control and adjustment of the mA and/or kV according to patient size.    COMPARISON: None    FINDINGS:    There are bilateral breast implants. No evidence of focal consolidation or pleural effusion.  There are no pulmonary nodules seen.  There is no evidence of mediastinal or hilar adenopathy.  There is no evidence of pericardial effusion.  No large masses are seen within the upper abdomen.  There is filling defect seen within a right upper lobe segmental vessel consistent with pulmonary embolus.  RV/LV ratio is measured at less than 0.9. No evidence of leftward ventricular bowing.     Impression       Filling defect within a right upper lobe pulmonary arterial segmental vessel consistent with pulmonary embolus. No evidence of right heart strain.         Assessment and Plan:  -VTE  -The patient states her father has had multiple strokes and a possible blood clot in the heart for which she is on anticoagulation .  She is not sure if her father has actually had a true VTE event.  No other family history of thrombosis.  -Patient has at least one instance of unprovoked VTE.  Recommend lifelong anticoagulation.  -Follow-up in 6 months, dose reduction of Xarelto can be discussed at that time.      She agreed and verbalized her agreement and understanding with the current plan.  I answered all questions and concerns she has at this time.  Dear  Monet Blanton, NINO.P.BRIAN.,  Thank you for allowing me to participate in her care.    All labs and/or imaging studies mentioned in the note above were reviewed with and explained to the patient as a pertain to medical decision making.    Please note that this dictation was created using voice recognition software. I have made every reasonable attempt to correct obvious errors, but I expect that there are errors of grammar and possibly content that I did not discover before finalizing the note.      SIGNATURES:  Arthur Frias    CC:  DAKOTA Mayfield Janice, A.P.N.

## 2018-08-06 ENCOUNTER — TELEPHONE (OUTPATIENT)
Dept: MEDICAL GROUP | Facility: MEDICAL CENTER | Age: 33
End: 2018-08-06

## 2018-08-06 NOTE — TELEPHONE ENCOUNTER
Pt called stated the gyn she got referred to will not remove the BC implant since she is on blood thinners. He stated she will need to stop the blood thinner before he will do anything. Pt understood you told her to continue the blood thinner so she has been. Pt is worried and not sure what she needs to do to get the implant removed.

## 2018-08-06 NOTE — TELEPHONE ENCOUNTER
Please check with pt.  clair find out exactly what was implanted in her arm - the name of the med.  explanon is not in [place] so it has to be something else.  i am then going to email dr jewell to see how strongly he feels about it coming out.  If its a jprogesterone only med then its not terribly urgent.  i can always call the gyn when i know what was placed if best feels that it needs to be removed.

## 2018-08-06 NOTE — LETTER
[facility]  Hallie Norris M.D.  [address]  [place] [address]  Fax: [phone]   Authorization for Release/Disclosure of   Protected Health Information   Name: ISIDRA LAMAR : 1985 SSN: [id number]   Address: [address]  [place] [address] Phone:    [phone] (home)    I authorize the entity listed below to release/disclose the PHI below to:   [facility]/Hallie Norris M.D. and Hallie Norris M.D.   Provider or Entity Name:  /[facility]   Address   [place], [place] Phone:      Fax:  [phone]   Reason for request: continuity of care   Information to be released:    [  ] LAST COLONOSCOPY,  including any PATH REPORT and follow-up  [  ] LAST FIT/COLOGUARD RESULT [  ] LAST DEXA  [  ] LAST MAMMOGRAM  [  ] LAST PAP  [  ] LAST LABS [  ] RETINA EXAM REPORT  [  ] IMMUNIZATION RECORDS  [ XXXXXX ] Release all info      [  ] Check here and initial the line next to each item to release ALL health information INCLUDING  _____ Care and treatment for drug and / or alcohol abuse  _____ HIV testing, infection status, or AIDS  _____ Genetic Testing    DATES OF SERVICE OR TIME PERIOD TO BE DISCLOSED: _____________  I understand and acknowledge that:  * This Authorization may be revoked at any time by you in writing, except if your health information has already been used or disclosed.  * Your health information that will be used or disclosed as a result of you signing this authorization could be re-disclosed by the recipient. If this occurs, your re-disclosed health information may no longer be protected by State or Federal laws.  * You may refuse to sign this Authorization. Your refusal will not affect your ability to obtain treatment.  * This Authorization becomes effective upon signing and will  on (date) __________.      If no date is indicated, this Authorization will  one (1) year from the signature date.    Name:  Kate Backner    Signature: this is for continuity of care (URGENT PLEASE)   Date:     8/8/2018       PLEASE FAX REQUESTED RECORDS BACK TO: [phone]

## 2018-08-08 NOTE — TELEPHONE ENCOUNTER
Have you spoken to the gyn? I urgently requested records from the base she received the implant from

## 2018-08-08 NOTE — TELEPHONE ENCOUNTER
Please call pt and let her know that i spoke with tristian.  He still refuses to remove the explanon until she is off xarelto for 24 hrs.  i have emailed dr jewell about this.  i am waiting to hear from him on what to do.

## 2018-08-10 ENCOUNTER — TELEPHONE (OUTPATIENT)
Dept: URGENT CARE | Facility: PHYSICIAN GROUP | Age: 33
End: 2018-08-10

## 2018-08-10 NOTE — TELEPHONE ENCOUNTER
Joanne Nickerson! I was hoping you could help us with a resolution on this pt (she is a pt of ). She has had pulmonary emboli in the past and has recently had another one. Pt has the nexplanon implant for birthcontrol and is needing it removed. GYN has refused to remove it since she is on blood thinners. Monet Harvinder is wondering how we should proceed. Should pt stop the blood thinner for GYN to do removal? Or should we do referral to general surgery?

## 2018-08-10 NOTE — TELEPHONE ENCOUNTER
Dr. Yu did jean leave you with this note? Or should I ask Dr. Norris? We are just waiting to see what Dr. jewell recommends. (general surg, or anything else)

## 2018-08-10 NOTE — TELEPHONE ENCOUNTER
Pt has apt with  for removal. With approval from hematology to stop xaralto for 24hrs. Pt informed of appt 8- at 9:00. Pt has been informed

## 2018-08-10 NOTE — TELEPHONE ENCOUNTER
Yes we want to remove the implant due to the coagulability. I will inform the pt and have her make an apt with GYN. thk you so much

## 2018-08-14 ENCOUNTER — OFFICE VISIT (OUTPATIENT)
Dept: MEDICAL GROUP | Facility: MEDICAL CENTER | Age: 33
End: 2018-08-14
Payer: COMMERCIAL

## 2018-08-14 DIAGNOSIS — Z30.09 COUNSELING FOR BIRTH CONTROL, ORAL CONTRACEPTIVES: Primary | ICD-10-CM

## 2018-08-14 DIAGNOSIS — Z30.46 ENCOUNTER FOR NEXPLANON REMOVAL: ICD-10-CM

## 2018-08-14 PROCEDURE — 11982 REMOVE DRUG IMPLANT DEVICE: CPT | Performed by: FAMILY MEDICINE

## 2018-08-14 PROCEDURE — 99214 OFFICE O/P EST MOD 30 MIN: CPT | Mod: 25 | Performed by: FAMILY MEDICINE

## 2018-08-14 RX ORDER — ACETAMINOPHEN AND CODEINE PHOSPHATE 120; 12 MG/5ML; MG/5ML
1 SOLUTION ORAL DAILY
Qty: 84 TAB | Refills: 0 | Status: SHIPPED | OUTPATIENT
Start: 2018-08-14 | End: 2018-11-01

## 2018-08-19 NOTE — ASSESSMENT & PLAN NOTE
Chronic, uncontrolled, patient states that she is currently in a hypercoagulable state, and that her various specialists are requesting that she have removal of Nexplanon implant, even though it is a progesterone predominant implant, they fear that there is enough of an estrogen component to the medication that it can be exacerbating her hypercoagulable state.    Of note, patient presented to a general surgeon or another type of surgeon/OB/GYN physician in the past, and was told that the implant was too deep for the comfort for removal.    ROS is negative for right upper extremity pain/paresthesias/numbness/radicular pain/weakness.    ROS is NEGATIVE for fevers, chills, generalized weakness/fatigue, dizziness, vertigo, lightheadedness, tachypnea, dyspnea, pain on deep inspiration, tachycardia, palpitations, bilateral lower extremity pain/paresthesias/pallor/poikilothermia/weakness/paralysis.

## 2018-08-19 NOTE — ASSESSMENT & PLAN NOTE
New problem for medical evaluation, uncontrolled, patient states that she would like to consider getting pregnant within the next few years.  Therefore, we had limited discussion of long-acting reversible contraception.      However, we discussed oral contraceptives in detail, and patient would like to proceed with progesterone only contraceptive pills, due to the side effect profile of estrogen-containing OCPs and her history of thromboembolic events.  Please note, patient is currently on anticoagulation chronically with Xarelto 20 mg p.o. daily.    Patient is not a smoker at present time, does not have a history of cerebrovascular accident.    ROS is NEGATIVE for confusion, altered mentation, word finding difficulty, memory loss, diplopia, visual scotomas, facial droop, dysarthria, dysphagia, hemiplegia, gait instability, new/abnormal paresthesias/numbness.

## 2018-08-19 NOTE — PROGRESS NOTES
Subjective:   Chief Complaint/History of Present Illness:  Kate Shelton is a 32 y.o. female established patient who presents today to discuss medical problems as listed below    The primary encounter diagnosis was Counseling for birth control, oral contraceptives. A diagnosis of Encounter for Nexplanon removal was also pertinent to this visit.    Counseling for birth control, oral contraceptives  New problem for medical evaluation, uncontrolled, patient states that she would like to consider getting pregnant within the next few years.  Therefore, we had limited discussion of long-acting reversible contraception.      However, we discussed oral contraceptives in detail, and patient would like to proceed with progesterone only contraceptive pills, due to the side effect profile of estrogen-containing OCPs and her history of thromboembolic events.  Please note, patient is currently on anticoagulation chronically with Xarelto 20 mg p.o. daily.    Patient is not a smoker at present time, does not have a history of cerebrovascular accident.    ROS is NEGATIVE for confusion, altered mentation, word finding difficulty, memory loss, diplopia, visual scotomas, facial droop, dysarthria, dysphagia, hemiplegia, gait instability, new/abnormal paresthesias/numbness.    Encounter for Nexplanon removal  Chronic, uncontrolled, patient states that she is currently in a hypercoagulable state, and that her various specialists are requesting that she have removal of Nexplanon implant, even though it is a progesterone predominant implant, they fear that there is enough of an estrogen component to the medication that it can be exacerbating her hypercoagulable state.    Of note, patient presented to a general surgeon or another type of surgeon/OB/GYN physician in the past, and was told that the implant was too deep for the comfort for removal.    ROS is negative for right upper extremity pain/paresthesias/numbness/radicular  pain/weakness.    ROS is NEGATIVE for fevers, chills, generalized weakness/fatigue, dizziness, vertigo, lightheadedness, tachypnea, dyspnea, pain on deep inspiration, tachycardia, palpitations, bilateral lower extremity pain/paresthesias/pallor/poikilothermia/weakness/paralysis.      Patient Active Problem List    Diagnosis Date Noted   • Encounter for Nexplanon removal 08/14/2018   • Counseling for birth control, oral contraceptives 08/14/2018   • Pulmonary embolism (HCC) 07/09/2018   • Hypercoagulable state ([facility]) 07/09/2018   • Tobacco use disorder 07/09/2018   • Pharyngitis 07/09/2018   • Bilious vomiting with nausea 01/26/2018   • Dysuria 01/26/2018   • Obesity (BMI 30-39.9) 01/26/2018   • Acute vaginitis 01/26/2018   • Bipolar 1 disorder ([facility]) 01/26/2018   • ERICA (generalized anxiety disorder) 01/26/2018   • PUD (peptic ulcer disease) 01/26/2018       Additional History:   Allergies:    Nsaids and Ondansetron     Current Medications:     Current Outpatient Prescriptions   Medication Sig Dispense Refill   • norethindrone (MICRONOR) 0.35 MG tablet Take 1 Tab by mouth every day. 84 Tab 0   • rivaroxaban (XARELTO) 20 MG Tab tablet Take 1 Tab by mouth every day. 30 Tab 3   • lamotrigine (LAMICTAL) 150 MG tablet Take 200 mg by mouth 2 Times a Day.     • clonazepam (KLONOPIN) 1 MG Tab Take 1 mg by mouth 2 times a day as needed.  0   • zolpidem (AMBIEN) 10 MG Tab Take 10 mg by mouth at bedtime as needed.  0     No current facility-administered medications for this visit.         Social History:     Social History   Substance Use Topics   • Smoking status: Current Every Day Smoker     Packs/day: 0.50     Years: 6.00     Types: Cigarettes   • Smokeless tobacco: Never Used   • Alcohol use Yes      Comment: occasionally       ROS:     - NOTE: All other systems reviewed and are negative, except as in HPI.     Objective:   Physical Exam:    Vitals: There were no vitals taken for this visit.   BMI: There is no height or weight  on file to calculate BMI.   General/Constitutional: Vitals as above, Well nourished, well developed female in no acute distress   Head/Eyes: Head is grossly normal & atraumatic, bilateral conjunctivae clear and not injected, bilateral EOMI, bilateral PERRL   ENT: Bilateral external ears grossly normal in appearance, Hearing grossly intact, External nares normal in appearance and without discharge/bleeding   Respiratory: No respiratory distress, bilateral lungs are clear to ausculation in all lung fields (anterior/lateral/posterior), no wheezing/rhonchi/rales   Cardiovascular: Regular rate and rhythm without murmur/gallops/rubs, distal pulses are intact and equal bilaterally (radial, posterior tibial), no bilateral lower extremity edema   MSK: Nexplanon implant palpable in the subcutaneous fat layer of patients R medial biceps area, Gait grossly normal & not antalgic   Integumentary: No apparent rashes   Psych: Judgment grossly appropriate, no apparent depression/anxiety    Procedure: Nexplanon Removal    **NOTE: Sterile technique was maintained throughout procedure       1) Risks, benefits and alternatives of procedure and sequelae of placement were discussed with patient including, but not limited to:        A) Risks: Skin infection or scarring, paresthesias in arm, irregular menstrual bleeding        B) Benefits: Removal of device, increased chance of fertility        C) Alternatives: Continued use of current birth control method (Nexplanon), further discussion of birth control options, referral to OB/Gyn for removal.    2) Correct site was verified by patient and myself as left arm.  Nexplanon implant was palpated to be in the subcutaneous fat layer of the R medial biceps area.    3) Area was cleaned using Chlorhexidine swabs with bryan still visible.    4) 1mL of 2% lidocaine with epinephrine used for subcutaneous anesthesia.  Anesthesia confirmed by using pickups in the sterile area away from and in area that  was anesthetized.    5) Countertraction placed onto skin using my non-dominant hand throughout removal process:   - #11 blade used to make initial 5mm incision   - Small narrow hemostats were used to try to extract the implant to no success   - Area of incision was extended to 1cm with re-attempt at extraction to no success   - Area of incision was then extended to 1.5cm and implant was able to be visualized however with fibrous covering over the proximal edge of the implant closest to the wound.  This was broken apart with the hemostats and gentle grasping motions, then hemostats were applied with firm pressure and implant was able to be extracted.    6)     7) Topical antibiotic + adhesive bandage placed over removal site.    8) Patient advised to use condoms until decision is made for next type of contraceptive, if any.    9) Results:         A) Minimal bleeding with good hemostasis achieved.        B) The patient tolerated the procedure well.      Health Maintenance:     - Not reviewed at this visit    Imaging/Labs:     - 07/2018 -- CMP WNL    Assessment and Plan:   1. Counseling for birth control, oral contraceptives  New problem, uncontrolled, patient with hypercoagulable state.  Trial of progesterone-only OCPs.   - norethindrone (MICRONOR) 0.35 MG tablet; Take 1 Tab by mouth every day.  Dispense: 84 Tab; Refill: 0    2. Encounter for Nexplanon removal  Chronic, uncontrolled, removed as above.  Now resolved.        RTC: in 1week for wound check.    PLEASE NOTE: This dictation was created using voice recognition software. I have made every reasonable attempt to correct obvious errors, but I expect that there are errors of grammar and possibly content that I did not discover before finalizing the note.

## 2018-08-23 ENCOUNTER — OFFICE VISIT (OUTPATIENT)
Dept: MEDICAL GROUP | Facility: MEDICAL CENTER | Age: 33
End: 2018-08-23
Payer: COMMERCIAL

## 2018-08-23 VITALS
WEIGHT: 173.4 LBS | TEMPERATURE: 98.5 F | HEART RATE: 101 BPM | OXYGEN SATURATION: 96 % | HEIGHT: 61 IN | SYSTOLIC BLOOD PRESSURE: 110 MMHG | BODY MASS INDEX: 32.74 KG/M2 | DIASTOLIC BLOOD PRESSURE: 70 MMHG

## 2018-08-23 DIAGNOSIS — Z48.89 ENCOUNTER FOR POST SURGICAL WOUND CHECK: ICD-10-CM

## 2018-08-27 PROBLEM — Z48.89 ENCOUNTER FOR POST SURGICAL WOUND CHECK: Status: ACTIVE | Noted: 2018-08-27

## 2018-08-27 NOTE — PROGRESS NOTES
Encounter for post surgical wound check  Patient's wound is well-healing, and she is taking care of it with regular wound care with cleansing with alcohol swabs and use of topical antibiotics.  Patient states that there has been no drainage over the last few days.  Patient does not have any tenderness or erythema or purulence or palpable fluctuance to the wound on physical exam.    3 simple interrupted sutures were removed as there was no wound dehiscence, and the area was cleaned with chlorhexidine swabs as well as having topical antibiotics and bandage placed over the wounds.  Patient given appropriate wound care instructions, as well as precautions on not to place pressure on this area.  Patient verbalized understanding.

## 2018-08-27 NOTE — ASSESSMENT & PLAN NOTE
Patient's wound is well-healing, and she is taking care of it with regular wound care with cleansing with alcohol swabs and use of topical antibiotics.  Patient states that there has been no drainage over the last few days.  Patient does not have any tenderness or erythema or purulence or palpable fluctuance to the wound on physical exam.

## 2018-10-13 LAB
ABO GROUP BLD: NORMAL
HBV SURFACE AG SERPL QL IA: NORMAL
RUBV IGG SERPL IA-ACNC: NORMAL

## 2018-11-01 ENCOUNTER — OFFICE VISIT (OUTPATIENT)
Dept: URGENT CARE | Facility: MEDICAL CENTER | Age: 33
End: 2018-11-01
Payer: COMMERCIAL

## 2018-11-01 ENCOUNTER — HOSPITAL ENCOUNTER (EMERGENCY)
Facility: MEDICAL CENTER | Age: 33
End: 2018-11-01
Attending: EMERGENCY MEDICINE
Payer: COMMERCIAL

## 2018-11-01 ENCOUNTER — APPOINTMENT (OUTPATIENT)
Dept: RADIOLOGY | Facility: MEDICAL CENTER | Age: 33
End: 2018-11-01
Attending: EMERGENCY MEDICINE
Payer: COMMERCIAL

## 2018-11-01 VITALS
DIASTOLIC BLOOD PRESSURE: 72 MMHG | WEIGHT: 169.5 LBS | HEART RATE: 96 BPM | OXYGEN SATURATION: 98 % | SYSTOLIC BLOOD PRESSURE: 116 MMHG | HEIGHT: 61 IN | BODY MASS INDEX: 32 KG/M2 | TEMPERATURE: 97.9 F

## 2018-11-01 VITALS
SYSTOLIC BLOOD PRESSURE: 124 MMHG | TEMPERATURE: 99 F | WEIGHT: 169.09 LBS | BODY MASS INDEX: 31.12 KG/M2 | RESPIRATION RATE: 16 BRPM | OXYGEN SATURATION: 96 % | HEART RATE: 94 BPM | DIASTOLIC BLOOD PRESSURE: 85 MMHG | HEIGHT: 62 IN

## 2018-11-01 DIAGNOSIS — Z86.711 HISTORY OF PULMONARY EMBOLISM: ICD-10-CM

## 2018-11-01 DIAGNOSIS — R11.2 NON-INTRACTABLE VOMITING WITH NAUSEA, UNSPECIFIED VOMITING TYPE: ICD-10-CM

## 2018-11-01 DIAGNOSIS — E86.0 DEHYDRATION: ICD-10-CM

## 2018-11-01 DIAGNOSIS — Z79.01 CHRONIC ANTICOAGULATION: ICD-10-CM

## 2018-11-01 DIAGNOSIS — R04.2 COUGHING UP BLOOD: ICD-10-CM

## 2018-11-01 DIAGNOSIS — R04.2 HEMOPTYSIS: Primary | ICD-10-CM

## 2018-11-01 DIAGNOSIS — J40 BRONCHITIS: ICD-10-CM

## 2018-11-01 LAB
ALBUMIN SERPL BCP-MCNC: 4.3 G/DL (ref 3.2–4.9)
ALBUMIN/GLOB SERPL: 1.5 G/DL
ALP SERPL-CCNC: 102 U/L (ref 30–99)
ALT SERPL-CCNC: 58 U/L (ref 2–50)
ANION GAP SERPL CALC-SCNC: 10 MMOL/L (ref 0–11.9)
APPEARANCE UR: ABNORMAL
APTT PPP: 39 SEC (ref 24.7–36)
AST SERPL-CCNC: 17 U/L (ref 12–45)
BACTERIA #/AREA URNS HPF: ABNORMAL /HPF
BASOPHILS # BLD AUTO: 0.2 % (ref 0–1.8)
BASOPHILS # BLD: 0.02 K/UL (ref 0–0.12)
BILIRUB SERPL-MCNC: 0.6 MG/DL (ref 0.1–1.5)
BILIRUB UR QL STRIP.AUTO: ABNORMAL
BNP SERPL-MCNC: 25 PG/ML (ref 0–100)
BUN SERPL-MCNC: 7 MG/DL (ref 8–22)
CALCIUM SERPL-MCNC: 8.7 MG/DL (ref 8.4–10.2)
CHLORIDE SERPL-SCNC: 105 MMOL/L (ref 96–112)
CO2 SERPL-SCNC: 18 MMOL/L (ref 20–33)
COLOR UR: YELLOW
CREAT SERPL-MCNC: 0.39 MG/DL (ref 0.5–1.4)
EKG IMPRESSION: NORMAL
EKG IMPRESSION: NORMAL
EOSINOPHIL # BLD AUTO: 0.11 K/UL (ref 0–0.51)
EOSINOPHIL NFR BLD: 1.1 % (ref 0–6.9)
EPI CELLS #/AREA URNS HPF: ABNORMAL /HPF
ERYTHROCYTE [DISTWIDTH] IN BLOOD BY AUTOMATED COUNT: 41.8 FL (ref 35.9–50)
GLOBULIN SER CALC-MCNC: 2.8 G/DL (ref 1.9–3.5)
GLUCOSE SERPL-MCNC: 85 MG/DL (ref 65–99)
GLUCOSE UR STRIP.AUTO-MCNC: NEGATIVE MG/DL
HCT VFR BLD AUTO: 40.3 % (ref 37–47)
HGB BLD-MCNC: 14.1 G/DL (ref 12–16)
IMM GRANULOCYTES # BLD AUTO: 0.04 K/UL (ref 0–0.11)
IMM GRANULOCYTES NFR BLD AUTO: 0.4 % (ref 0–0.9)
INR PPP: 1.06 (ref 0.87–1.13)
KETONES UR STRIP.AUTO-MCNC: >=80 MG/DL
LEUKOCYTE ESTERASE UR QL STRIP.AUTO: NEGATIVE
LIPASE SERPL-CCNC: 18 U/L (ref 7–58)
LYMPHOCYTES # BLD AUTO: 2.08 K/UL (ref 1–4.8)
LYMPHOCYTES NFR BLD: 20.4 % (ref 22–41)
MCH RBC QN AUTO: 31.8 PG (ref 27–33)
MCHC RBC AUTO-ENTMCNC: 35 G/DL (ref 33.6–35)
MCV RBC AUTO: 91 FL (ref 81.4–97.8)
MICRO URNS: ABNORMAL
MONOCYTES # BLD AUTO: 1.01 K/UL (ref 0–0.85)
MONOCYTES NFR BLD AUTO: 9.9 % (ref 0–13.4)
MUCOUS THREADS #/AREA URNS HPF: ABNORMAL /HPF
NEUTROPHILS # BLD AUTO: 6.96 K/UL (ref 2–7.15)
NEUTROPHILS NFR BLD: 68 % (ref 44–72)
NITRITE UR QL STRIP.AUTO: NEGATIVE
NRBC # BLD AUTO: 0 K/UL
NRBC BLD-RTO: 0 /100 WBC
PH UR STRIP.AUTO: 7 [PH]
PLATELET # BLD AUTO: 246 K/UL (ref 164–446)
PMV BLD AUTO: 11 FL (ref 9–12.9)
POTASSIUM SERPL-SCNC: 3.2 MMOL/L (ref 3.6–5.5)
PROT SERPL-MCNC: 7.1 G/DL (ref 6–8.2)
PROT UR QL STRIP: 30 MG/DL
PROTHROMBIN TIME: 13.7 SEC (ref 12–14.6)
RBC # BLD AUTO: 4.43 M/UL (ref 4.2–5.4)
RBC # URNS HPF: ABNORMAL /HPF
RBC UR QL AUTO: ABNORMAL
SODIUM SERPL-SCNC: 133 MMOL/L (ref 135–145)
SP GR UR STRIP.AUTO: 1.02
TROPONIN I SERPL-MCNC: <0.02 NG/ML (ref 0–0.04)
WBC # BLD AUTO: 10.2 K/UL (ref 4.8–10.8)
WBC #/AREA URNS HPF: ABNORMAL /HPF

## 2018-11-01 PROCEDURE — 93005 ELECTROCARDIOGRAM TRACING: CPT

## 2018-11-01 PROCEDURE — 83690 ASSAY OF LIPASE: CPT

## 2018-11-01 PROCEDURE — 96374 THER/PROPH/DIAG INJ IV PUSH: CPT

## 2018-11-01 PROCEDURE — 83880 ASSAY OF NATRIURETIC PEPTIDE: CPT

## 2018-11-01 PROCEDURE — 85610 PROTHROMBIN TIME: CPT

## 2018-11-01 PROCEDURE — 93005 ELECTROCARDIOGRAM TRACING: CPT | Performed by: EMERGENCY MEDICINE

## 2018-11-01 PROCEDURE — 85025 COMPLETE CBC W/AUTO DIFF WBC: CPT

## 2018-11-01 PROCEDURE — 700105 HCHG RX REV CODE 258: Performed by: EMERGENCY MEDICINE

## 2018-11-01 PROCEDURE — 80053 COMPREHEN METABOLIC PANEL: CPT

## 2018-11-01 PROCEDURE — 99214 OFFICE O/P EST MOD 30 MIN: CPT | Performed by: FAMILY MEDICINE

## 2018-11-01 PROCEDURE — 81001 URINALYSIS AUTO W/SCOPE: CPT

## 2018-11-01 PROCEDURE — 85730 THROMBOPLASTIN TIME PARTIAL: CPT

## 2018-11-01 PROCEDURE — 700111 HCHG RX REV CODE 636 W/ 250 OVERRIDE (IP): Performed by: EMERGENCY MEDICINE

## 2018-11-01 PROCEDURE — 71045 X-RAY EXAM CHEST 1 VIEW: CPT

## 2018-11-01 PROCEDURE — 99284 EMERGENCY DEPT VISIT MOD MDM: CPT

## 2018-11-01 PROCEDURE — 84484 ASSAY OF TROPONIN QUANT: CPT

## 2018-11-01 PROCEDURE — 36415 COLL VENOUS BLD VENIPUNCTURE: CPT

## 2018-11-01 RX ORDER — AZITHROMYCIN 250 MG/1
TABLET, FILM COATED ORAL
Qty: 6 TAB | Refills: 0 | Status: SHIPPED | OUTPATIENT
Start: 2018-11-01 | End: 2019-02-11

## 2018-11-01 RX ORDER — ALPRAZOLAM 0.5 MG/1
0.5 TABLET ORAL 3 TIMES DAILY PRN
COMMUNITY

## 2018-11-01 RX ORDER — METOCLOPRAMIDE HYDROCHLORIDE 5 MG/ML
10 INJECTION INTRAMUSCULAR; INTRAVENOUS ONCE
Status: COMPLETED | OUTPATIENT
Start: 2018-11-01 | End: 2018-11-01

## 2018-11-01 RX ORDER — FLUCONAZOLE 200 MG/1
200 TABLET ORAL DAILY
Qty: 2 TAB | Refills: 0 | Status: SHIPPED | OUTPATIENT
Start: 2018-11-01 | End: 2018-11-03

## 2018-11-01 RX ORDER — METOCLOPRAMIDE 10 MG/1
10 TABLET ORAL 4 TIMES DAILY PRN
Qty: 20 TAB | Refills: 0 | Status: SHIPPED | OUTPATIENT
Start: 2018-11-01 | End: 2019-02-11

## 2018-11-01 RX ORDER — SODIUM CHLORIDE 9 MG/ML
2000 INJECTION, SOLUTION INTRAVENOUS ONCE
Status: COMPLETED | OUTPATIENT
Start: 2018-11-01 | End: 2018-11-01

## 2018-11-01 RX ADMIN — SODIUM CHLORIDE 2000 ML: 9 INJECTION, SOLUTION INTRAVENOUS at 15:06

## 2018-11-01 RX ADMIN — METOCLOPRAMIDE 10 MG: 5 INJECTION, SOLUTION INTRAMUSCULAR; INTRAVENOUS at 15:07

## 2018-11-01 ASSESSMENT — PAIN SCALES - GENERAL
PAINLEVEL_OUTOF10: 7
PAINLEVEL: 7=MODERATE-SEVERE PAIN

## 2018-11-01 NOTE — ED TRIAGE NOTES
".  Chief Complaint   Patient presents with   • N/V   • Hemoptysis     3x since last night, bright red, specks of blood   • Shortness of Breath     started the lst 3 days   • Lightheadedness   • Pregnancy     11 weeks pregnant, on blood thinners.      ./85   Pulse 94   Temp 36.7 °C (98.1 °F)   Resp 16   Ht 1.575 m (5' 2\")   Wt 76.7 kg (169 lb 1.5 oz)   SpO2 96%   BMI 30.93 kg/m²     Pt is on blood thinners for a PE in the last 6 months  "

## 2018-11-01 NOTE — PROGRESS NOTES
"Subjective:      Kate Shelton is a 32 y.o. female who presents with Cough (coughing up blood, 1 day, bright red blood)      - This is a very pleasant, well and non-toxic appearing 32 y.o. female with complaints of coughing x 5 days and past 24hrs have been w/ streaks and \"globs\" of blood. Feels a little central chest discomfort and says she feels winded. Says she had similar symptoms in past when she had a PE but she feels worse this time. Also 5-10 episodes/day or vomiting.     ~9wks pregnant           ALLERGIES:  Nsaids and Ondansetron     PMH:  Past Medical History:   Diagnosis Date   • Anxiety    • Bipolar 1 disorder (HCC)    • Depression    • DVT of upper extremity (deep vein thrombosis) (HCC)     From IV   • DVT, lower extremity, recurrent (HCC)     hx left arm DVT at age 29   • GERD (gastroesophageal reflux disease)    • IBD (inflammatory bowel disease)    • PTSD (post-traumatic stress disorder)    • PUD (peptic ulcer disease)         MEDS:    Current Outpatient Prescriptions:   •  enoxaparin (LOVENOX) 40 MG/0.4ML Solution inj, Inject 80 mg as instructed every day., Disp: , Rfl:   •  ALPRAZolam (XANAX) 0.5 MG Tab, Take 0.5 mg by mouth at bedtime as needed for Sleep., Disp: , Rfl:   •  zolpidem (AMBIEN) 10 MG Tab, Take 10 mg by mouth at bedtime as needed., Disp: , Rfl: 0    ** I have documented what I find to be significant in regards to past medical, social, family and surgical history  in my HPI or under PMH/PSH/FH review section, otherwise it is contributory **           HPI    Review of Systems   All other systems reviewed and are negative.         Objective:     /72   Pulse 96   Temp 36.6 °C (97.9 °F)   Ht 1.549 m (5' 1\")   Wt 76.9 kg (169 lb 8 oz)   SpO2 98%   BMI 32.03 kg/m²      Physical Exam   Constitutional: She appears well-developed. No distress.   HENT:   Head: Normocephalic and atraumatic.   Mouth/Throat: Oropharynx is clear and moist.   Eyes: Conjunctivae are normal. "   Neck: Neck supple.   Cardiovascular: Regular rhythm.    No murmur heard.  Pulmonary/Chest: Effort normal. No respiratory distress.   Neurological: She is alert. She exhibits normal muscle tone.   Skin: Skin is warm and dry.   Psychiatric: She has a normal mood and affect. Judgment normal.   Nursing note and vitals reviewed.              Assessment/Plan:         1. Coughing up blood             Sent to [facility]AC[facility] DO, POV.

## 2018-11-01 NOTE — ED NOTES
Patient provided printed discharge instructions which included signs and symptoms to look out for, why to return to ER, and other follow up appointment to attend with OB. Patient provided prescriptions for Diflucan, Reglan, and Zithromax, information on medications, and how to . Patient stated they understand discharge instructions and had no further questions or concerns at this time. Patient discharged to home with family. Patient ambulated out of ER with stable gait.

## 2018-11-01 NOTE — ED PROVIDER NOTES
ED Provider Note    CHIEF COMPLAINT  Chief Complaint   Patient presents with   • N/V   • Hemoptysis     3x since last night, bright red, specks of blood   • Shortness of Breath     started the lst 3 days   • Lightheadedness   • Pregnancy     11 weeks pregnant, on blood thinners.        HPI  Kate Shelton is a 32 y.o.  with 1 previous blighted ovum female who presents at 11 weeks of pregnancy on twice daily Lovenox for a left upper extremity DVT 7 years ago and a PE 6 months ago.  4 days ago, she started having a mildly productive cough.  She has had subjective fevers and chills since then as well as vomiting.  She is incontinent of urine with coughing.  Some of her phlegm has had specks of blood in it, but she has not coughed up kate blood without phlegm.  No diarrhea or dysuria.  She has been feeling lightheaded.  She feels somewhat short of breath for the last 2 days.  She did take Lovenox this morning.  She has had 2 prenatal care appointments with Dr. Kathy Carpenter.    REVIEW OF SYSTEMS  See HPI for further details. All other systems are negative.    PAST MEDICAL HISTORY  Past Medical History:   Diagnosis Date   • Anxiety    • Bipolar 1 disorder (HCC)    • Depression    • DVT of upper extremity (deep vein thrombosis) (HCC)     From IV   • DVT, lower extremity, recurrent (HCC)     hx left arm DVT at age 29   • GERD (gastroesophageal reflux disease)    • IBD (inflammatory bowel disease)    • PTSD (post-traumatic stress disorder)    • PUD (peptic ulcer disease)        FAMILY HISTORY  Family History   Problem Relation Age of Onset   • Psychiatry Mother    • Other Mother         killed in [place]   • Heart Disease Father    • Stroke Father        SOCIAL HISTORY  Social History     Social History   • Marital status:      Spouse name: N/A   • Number of children: N/A   • Years of education: N/A     Social History Main Topics   • Smoking status: Current Every Day Smoker     Packs/day: 0.25      "Years: 6.00     Types: Cigarettes   • Smokeless tobacco: Never Used   • Alcohol use No      Comment: occasionally   • Drug use: No   • Sexual activity: Yes     Partners: Male     Birth control/ protection: Implant     Other Topics Concern   • Not on file     Social History Narrative   • No narrative on file       SURGICAL HISTORY  Past Surgical History:   Procedure Laterality Date   • MAMMOPLASTY AUGMENTATION         CURRENT MEDICATIONS    Current Outpatient Prescriptions:   •  enoxaparin (LOVENOX) 40 MG/0.4ML Solution inj, Inject 80 mg as instructed every day., Disp: , Rfl:   •  ALPRAZolam (XANAX) 0.5 MG Tab, Take 0.5 mg by mouth at bedtime as needed for Sleep., Disp: , Rfl:   •  zolpidem (AMBIEN) 10 MG Tab, Take 10 mg by mouth at bedtime as needed., Disp: , Rfl: 0      ALLERGIES  Allergies   Allergen Reactions   • Nsaids      Hx ulcers   • Ondansetron        PHYSICAL EXAM  VITAL SIGNS: /85   Pulse 94   Temp 36.7 °C (98.1 °F)   Resp 16   Ht 1.575 m (5' 2\")   Wt 76.7 kg (169 lb 1.5 oz)   SpO2 96%   BMI 30.93 kg/m²  @TEREZA[[id number]::@   Constitutional: Well developed, Well nourished, No acute respiratory distress, Non-toxic appearance.   HENT: Normocephalic, Atraumatic, Bilateral external ears normal, Oropharynx clear, mucous membranes are dry.  Eyes: PERRLA, EOMI, Conjunctiva normal, No discharge. No icterus.  Neck: Normal range of motion. Supple, No stridor.   Lymphatic: No cervical lymphadenopathy noted.   Cardiovascular: Mild tachycardia, Normal rhythm, No murmurs, No rubs, No gallops.   Thorax & Lungs: Clear to auscultation bilaterally, No respiratory distress, No wheezing.  Fine rhonchi at right base  Abdomen: Bowel sounds normal, Soft, No tenderness, No masses, no rebound or guarding   Skin: Warm, Dry, No erythema, No rash.   Extremities: Intact distal pulses, No edema, No tenderness  Musculoskeletal: Good range of motion in all major joints. No tenderness to palpation or major deformities noted. " Normal gait.  Neurologic: Alert & oriented x 3, cranial nerve and cerebellar exams normal. No focal deficits noted.   Psychiatric: Affect normal, Judgment normal, Mood normal.     EKG  12-lead EKG by my interpretation shows: SINUS RHYTHM at a rate of 85  Normal axis.  Good R wave progression.  No ST or T wave changes to indicate ischemia or infarct  INFERIOR Q WAVES, nonpathologic  Compared to ECG 07/10/2018 06:19:04  No changes    RADIOLOGY/PROCEDURES  DX-CHEST-PORTABLE (1 VIEW)   Final Result         1. No acute cardiopulmonary abnormalities are identified.            COURSE & MEDICAL DECISION MAKING  Pertinent Labs & Imaging studies reviewed. (See chart for details)  I reviewed the patient's admission in early July of this year where she was found to have a right upper lobe PE with no evidence of heart strain.  Differential diagnosis today includes bronchitis, pneumonia, PE, empyema.  IV fluids given for tachycardia, volume loss, dry mucous membranes.  Reglan for nausea.  Results for orders placed or performed during the hospital encounter of 11/01/18   CBC WITH DIFFERENTIAL   Result Value Ref Range    WBC 10.2 4.8 - 10.8 K/uL    RBC 4.43 4.20 - 5.40 M/uL    Hemoglobin 14.1 12.0 - 16.0 g/dL    Hematocrit 40.3 37.0 - 47.0 %    MCV 91.0 81.4 - 97.8 fL    MCH 31.8 27.0 - 33.0 pg    MCHC 35.0 33.6 - 35.0 g/dL    RDW 41.8 35.9 - 50.0 fL    Platelet Count 246 164 - 446 K/uL    MPV 11.0 9.0 - 12.9 fL    Neutrophils-Polys 68.00 44.00 - 72.00 %    Lymphocytes 20.40 (L) 22.00 - 41.00 %    Monocytes 9.90 0.00 - 13.40 %    Eosinophils 1.10 0.00 - 6.90 %    Basophils 0.20 0.00 - 1.80 %    Immature Granulocytes 0.40 0.00 - 0.90 %    Nucleated RBC 0.00 /100 WBC    Neutrophils (Absolute) 6.96 2.00 - 7.15 K/uL    Lymphs (Absolute) 2.08 1.00 - 4.80 K/uL    Monos (Absolute) 1.01 (H) 0.00 - 0.85 K/uL    Eos (Absolute) 0.11 0.00 - 0.51 K/uL    Baso (Absolute) 0.02 0.00 - 0.12 K/uL    Immature Granulocytes (abs) 0.04 0.00 - 0.11 K/uL     NRBC (Absolute) 0.00 K/uL   COMP METABOLIC PANEL   Result Value Ref Range    Sodium 133 (L) 135 - 145 mmol/L    Potassium 3.2 (L) 3.6 - 5.5 mmol/L    Chloride 105 96 - 112 mmol/L    Co2 18 (L) 20 - 33 mmol/L    Anion Gap 10.0 0.0 - 11.9    Glucose 85 65 - 99 mg/dL    Bun 7 (L) 8 - 22 mg/dL    Creatinine 0.39 (L) 0.50 - 1.40 mg/dL    Calcium 8.7 8.4 - 10.2 mg/dL    AST(SGOT) 17 12 - 45 U/L    ALT(SGPT) 58 (H) 2 - 50 U/L    Alkaline Phosphatase 102 (H) 30 - 99 U/L    Total Bilirubin 0.6 0.1 - 1.5 mg/dL    Albumin 4.3 3.2 - 4.9 g/dL    Total Protein 7.1 6.0 - 8.2 g/dL    Globulin 2.8 1.9 - 3.5 g/dL    A-G Ratio 1.5 g/dL   LIPASE   Result Value Ref Range    Lipase 18 7 - 58 U/L   TROPONIN   Result Value Ref Range    Troponin I <0.02 0.00 - 0.04 ng/mL   BTYPE NATRIURETIC PEPTIDE   Result Value Ref Range    B Natriuretic Peptide 25 0 - 100 pg/mL   URINALYSIS (UA)   Result Value Ref Range    Color Yellow     Character Cloudy (A)     Specific Gravity 1.025 <1.035    Ph 7.0 5.0 - 8.0    Glucose Negative Negative mg/dL    Ketones >=80 (A) Negative mg/dL    Protein 30 (A) Negative mg/dL    Bilirubin Small (A) Negative    Nitrite Negative Negative    Leukocyte Esterase Negative Negative    Occult Blood Trace (A) Negative    Micro Urine Req Microscopic    PROTHROMBIN TIME (INR)   Result Value Ref Range    PT 13.7 12.0 - 14.6 sec    INR 1.06 0.87 - 1.13   APTT   Result Value Ref Range    APTT 39.0 (H) 24.7 - 36.0 sec   ESTIMATED GFR   Result Value Ref Range    GFR If African American >60 >60 mL/min/1.73 m 2    GFR If Non African American >60 >60 mL/min/1.73 m 2   URINE MICROSCOPIC (W/UA)   Result Value Ref Range    WBC 10-20 (A) /hpf    RBC Rare /hpf    Bacteria Many (A) None /hpf    Epithelial Cells Many (A) Few /hpf    Mucous Threads Many /hpf   EKG   Result Value Ref Range    Report       [facility] Emergency [facility].    Test Date:  2018-11-01  Pt Name:    ISIDRA LAMAR              Department:  [facility]  MRN:        [id number]                      Room:  Gender:     Female                       Technician: MIRTA  :        [id number]                   Requested By:ER TRIAGE PROTOCOL  Order #:    [id number]                    Reading MD: CHARAN AQUINO MD    Measurements  Intervals                                Axis  Rate:       85                           P:          34  DE:         136                          QRS:        40  QRSD:       86                           T:          7  QT:         372  QTc:        443    Interpretive Statements  SINUS RHYTHM at a rate of 85  Normal axis.  Good R wave progression.  No ST or T wave changes to indicate  ischemia or infarct  INFERIOR Q WAVES, nonpathologic  Compared to ECG 07/10/2018 06:19:04  No changes    Electronically Signed On 2018 16:26:26 PDT by CHARAN AQUINO MD       4:27 PM reevaluation the patient at bedside.  Tachycardia has resolved.  Patient is tolerating fluids and feeling much stronger.     The patient will return for new or worsening symptoms and is stable at the time of discharge.    The patient is referred to a primary physician for blood pressure management, diabetic screening, and for all other preventative health concerns.        DISPOSITION:  Patient will be discharged home in stable condition.    FOLLOW UP:  Cristine Godfrey M.D.  [address] [address]  [phone]      Monday as previously scheduled      OUTPATIENT MEDICATIONS:  New Prescriptions    AZITHROMYCIN (ZITHROMAX) 250 MG TAB    Take two tabs by mouth on day one, then one tab by mouth daily on days 2-5.    FLUCONAZOLE (DIFLUCAN) 200 MG TAB    Take 1 Tab by mouth every day for 2 days.    METOCLOPRAMIDE (REGLAN) 10 MG TAB    Take 1 Tab by mouth 4 times a day as needed.         FINAL IMPRESSION  1. Hemoptysis    2. Bronchitis    3. History of pulmonary embolism    4. Dehydration    5. Non-intractable vomiting with nausea, unspecified vomiting type     6. Chronic anticoagulation               Electronically signed by: Michelle Bray, 11/1/2018 2:15 PM

## 2018-11-01 NOTE — ED NOTES
Med rec complete per pt at bedside  Ok per Pt to discuss medications with visitor/s present  Allergies have been verified and updated  No ABX within the last 30 days

## 2019-01-11 ENCOUNTER — OFFICE VISIT (OUTPATIENT)
Dept: HEMATOLOGY ONCOLOGY | Facility: MEDICAL CENTER | Age: 34
End: 2019-01-11
Payer: COMMERCIAL

## 2019-01-11 VITALS
OXYGEN SATURATION: 97 % | HEIGHT: 62 IN | TEMPERATURE: 97.9 F | WEIGHT: 175.38 LBS | HEART RATE: 100 BPM | BODY MASS INDEX: 32.27 KG/M2 | DIASTOLIC BLOOD PRESSURE: 70 MMHG | SYSTOLIC BLOOD PRESSURE: 126 MMHG | RESPIRATION RATE: 16 BRPM

## 2019-01-11 DIAGNOSIS — I26.99 OTHER ACUTE PULMONARY EMBOLISM WITHOUT ACUTE COR PULMONALE (HCC): ICD-10-CM

## 2019-01-11 PROCEDURE — 99214 OFFICE O/P EST MOD 30 MIN: CPT | Performed by: INTERNAL MEDICINE

## 2019-01-11 ASSESSMENT — PAIN SCALES - GENERAL: PAINLEVEL: NO PAIN

## 2019-01-11 NOTE — PROGRESS NOTES
Date of visit: 1/11/2019  11:32 AM      Chief Complaint- Mnoet Blanton A.P.N.     Reason for consultation:   CC: PE     History of presenting illness:   Seen by Dr Frias  on 7/31/2018  Kate Shelton  is a 32 y.o. year old female clinic today for evaluation of anticoagulation.  Patient states she had an extensive DVT in her left upper extremity after placement of a peripheral IV in 2016.  She had 4 months of anticoagulation at the time, details unknown.  She was admitted to the hospital in July 2018 with hemoptysis and shortness of breath.  She was found to have a unprovoked pulmonary embolism and started on Xarelto.  She had implanted contraceptive disease which was discontinued    -7/2018 seen by Dr Frias-continued on Xarelto  -Subsequently became pregnant.  She was switched to Lovenox around the 8-week bryan.  -Seen by high-risk OB/GYN .  She has establish care with her obstetrician who is planning to switch her to heparin around the time of her delivery.  She had a brief ER visit in the interim with some nausea and minimal hemoptysis  No significant family history of DVT.  Her father had stroke.  She also had chronic mild smoking history when she had DVT.  Currently not smoking  Past Medical History:      Past Medical History:   Diagnosis Date   • Anxiety    • Bipolar 1 disorder (HCC)    • Depression    • DVT of upper extremity (deep vein thrombosis) (HCC)     From IV   • DVT, lower extremity, recurrent (HCC)     hx left arm DVT at age 29   • GERD (gastroesophageal reflux disease)    • IBD (inflammatory bowel disease)    • PTSD (post-traumatic stress disorder)    • PUD (peptic ulcer disease)        Past surgical history:       Past Surgical History:   Procedure Laterality Date   • MAMMOPLASTY AUGMENTATION         Allergies:       Nsaids and Ondansetron    Medications:         Current Outpatient Prescriptions   Medication Sig Dispense Refill   • norethindrone (MICRONOR) 0.35 MG tablet TAKE 1 TABLET BY MOUTH  EVERY DAY. 84 Tab 0   • enoxaparin (LOVENOX) 80 MG/0.8ML Solution inj Inject 80 mg as instructed every 12 hours.     • ALPRAZolam (XANAX) 0.5 MG Tab Take 0.5 mg by mouth at bedtime as needed for Sleep.     • Pseudoeph-Doxylamine-DM-APAP (NYQUIL PO) Take 30 mL by mouth Once.     • Doxylamine-PSE-APAP (TYLENOL SINUS NIGHT TIME PO) Take 2 Tabs by mouth 1 time daily as needed.     • metoclopramide (REGLAN) 10 MG Tab Take 1 Tab by mouth 4 times a day as needed. 20 Tab 0   • azithromycin (ZITHROMAX) 250 MG Tab Take two tabs by mouth on day one, then one tab by mouth daily on days 2-5. 6 Tab 0   • zolpidem (AMBIEN) 10 MG Tab Take 10 mg by mouth at bedtime as needed.  0     No current facility-administered medications for this visit.          Social History:     Social History     Social History   • Marital status:      Spouse name: N/A   • Number of children: N/A   • Years of education: N/A     Occupational History   • Not on file.     Social History Main Topics   • Smoking status: Current Every Day Smoker     Packs/day: 0.25     Years: 6.00     Types: Cigarettes   • Smokeless tobacco: Never Used   • Alcohol use No      Comment: occasionally   • Drug use: No   • Sexual activity: Yes     Partners: Male     Birth control/ protection: Implant     Other Topics Concern   • Not on file     Social History Narrative   • No narrative on file       Family History:      Family History   Problem Relation Age of Onset   • Psychiatry Mother    • Other Mother         killed in [place]   • Heart Disease Father    • Stroke Father        Review of Systems:  All other review of systems are negative except what was mentioned above in the HPI.    Constitutional: Negative for fever, chills, weight loss and malaise/fatigue.    HEENT: No new auditory or visual complaints. No sore throat and neck pain.     Respiratory: Negative for cough, sputum production, shortness of breath and wheezing.    Cardiovascular: Negative for chest pain,  palpitations, orthopnea and leg swelling.    Gastrointestinal: Negative for heartburn, nausea, vomiting and abdominal pain.    Genitourinary: Negative for dysuria, hematuria    Musculoskeletal: No new arthralgias or myalgias   Skin: Negative for rash and itching.    Neurological: Negative for focal weakness and headaches.    Endo/Heme/Allergies: No abnormal bleed/bruise.    Psychiatric/Behavioral: No new depression/anxiety.    Physical Exam:  Vitals: There were no vitals taken for this visit.    General: Not in acute distress, alert and oriented x 3  HEENT: No pallor, icterus. Oropharynx clear.   Neck: Supple, no palpable masses.  Lymph nodes: No palpable cervical, supraclavicular, axillary or inguinal lymphadenopathy.    CVS: regular rate and rhythm, no rubs or gallops  RESP: Clear to auscultate bilaterally, no wheezing or crackles.   ABD: Soft, non tender, non distended, positive bowel sounds, no palpable organomegaly  EXT: No edema or cyanosis  CNS: Alert and oriented x3, No focal deficits.  Skin- No rash      Labs:   No visits with results within 1 Week(s) from this visit.   Latest known visit with results is:   Admission on 2018, Discharged on 2018   Component Date Value Ref Range Status   • Report 2018    Final                    Value:[facility]    Test Date:  2018  Pt Name:    ISIDRA LAMAR              Department: [facility]  MRN:        [id number]                      Room:  Gender:     Female                       Technician: MIRTA  :        [id number]                   Requested By:ER TRIAGE PROTOCOL  Order #:    [id number]                    Reading MD: CHARAN AQUINO MD    Measurements  Intervals                                Axis  Rate:       85                           P:          34  MT:         136                          QRS:        40  QRSD:       86                           T:          7  QT:         372  QTc:         443    Interpretive Statements  SINUS RHYTHM at a rate of 85  Normal axis.  Good R wave progression.  No ST or T wave changes to indicate  ischemia or infarct  INFERIOR Q WAVES, nonpathologic  Compared to ECG 07/10/2018 06:19:04  No changes    Electronically Signed On 11-1-2018 16:26:26 PDT by CHARAN AQUINO MD     • WBC 11/01/2018 10.2  4.8 - 10.8 K/uL Final   • RBC 11/01/2018 4.43  4.20 - 5.40 M/uL Final   • Hemoglobin 11/01/2018 14.1  12.0 - 16.0 g/dL Final   • Hematocrit 11/01/2018 40.3  37.0 - 47.0 % Final   • MCV 11/01/2018 91.0  81.4 - 97.8 fL Final   • MCH 11/01/2018 31.8  27.0 - 33.0 pg Final   • MCHC 11/01/2018 35.0  33.6 - 35.0 g/dL Final   • RDW 11/01/2018 41.8  35.9 - 50.0 fL Final   • Platelet Count 11/01/2018 246  164 - 446 K/uL Final   • MPV 11/01/2018 11.0  9.0 - 12.9 fL Final   • Neutrophils-Polys 11/01/2018 68.00  44.00 - 72.00 % Final   • Lymphocytes 11/01/2018 20.40* 22.00 - 41.00 % Final   • Monocytes 11/01/2018 9.90  0.00 - 13.40 % Final   • Eosinophils 11/01/2018 1.10  0.00 - 6.90 % Final   • Basophils 11/01/2018 0.20  0.00 - 1.80 % Final   • Immature Granulocytes 11/01/2018 0.40  0.00 - 0.90 % Final   • Nucleated RBC 11/01/2018 0.00  /100 WBC Final   • Neutrophils (Absolute) 11/01/2018 6.96  2.00 - 7.15 K/uL Final    Includes immature neutrophils, if present.   • Lymphs (Absolute) 11/01/2018 2.08  1.00 - 4.80 K/uL Final   • Monos (Absolute) 11/01/2018 1.01* 0.00 - 0.85 K/uL Final   • Eos (Absolute) 11/01/2018 0.11  0.00 - 0.51 K/uL Final   • Baso (Absolute) 11/01/2018 0.02  0.00 - 0.12 K/uL Final   • Immature Granulocytes (abs) 11/01/2018 0.04  0.00 - 0.11 K/uL Final   • NRBC (Absolute) 11/01/2018 0.00  K/uL Final   • Sodium 11/01/2018 133* 135 - 145 mmol/L Final   • Potassium 11/01/2018 3.2* 3.6 - 5.5 mmol/L Final   • Chloride 11/01/2018 105  96 - 112 mmol/L Final   • Co2 11/01/2018 18* 20 - 33 mmol/L Final   • Anion Gap 11/01/2018 10.0  0.0 - 11.9 Final   • Glucose 11/01/2018 85  65 - 99  mg/dL Final   • Bun 11/01/2018 7* 8 - 22 mg/dL Final   • Creatinine 11/01/2018 0.39* 0.50 - 1.40 mg/dL Final   • Calcium 11/01/2018 8.7  8.4 - 10.2 mg/dL Final   • AST(SGOT) 11/01/2018 17  12 - 45 U/L Final   • ALT(SGPT) 11/01/2018 58* 2 - 50 U/L Final   • Alkaline Phosphatase 11/01/2018 102* 30 - 99 U/L Final   • Total Bilirubin 11/01/2018 0.6  0.1 - 1.5 mg/dL Final   • Albumin 11/01/2018 4.3  3.2 - 4.9 g/dL Final   • Total Protein 11/01/2018 7.1  6.0 - 8.2 g/dL Final   • Globulin 11/01/2018 2.8  1.9 - 3.5 g/dL Final   • A-G Ratio 11/01/2018 1.5  g/dL Final   • Lipase 11/01/2018 18  7 - 58 U/L Final   • Troponin I 11/01/2018 <0.02  0.00 - 0.04 ng/mL Final    Comment: Effective 4-1-2011, the reference range for positive Troponin  has been changed.  This change follows the recommendation of  the American College of Cardiology (ACC) committee in  conjunction with the 99th percentile reference population.  ____________________________________________________________  Normal TNI: 0.00 - 0.04 ng/mL  Clinical Correlation Indicated:  0.05 - 0.50 ng/mL  Suggestive of MI:  >0.50 ng/mL     • B Natriuretic Peptide 11/01/2018 25  0 - 100 pg/mL Final   • Color 11/01/2018 Yellow   Final   • Character 11/01/2018 Cloudy*  Final   • Specific Gravity 11/01/2018 1.025  <1.035 Final   • Ph 11/01/2018 7.0  5.0 - 8.0 Final   • Glucose 11/01/2018 Negative  Negative mg/dL Final   • Ketones 11/01/2018 >=80* Negative mg/dL Final   • Protein 11/01/2018 30* Negative mg/dL Final   • Bilirubin 11/01/2018 Small* Negative Final   • Nitrite 11/01/2018 Negative  Negative Final   • Leukocyte Esterase 11/01/2018 Negative  Negative Final   • Occult Blood 11/01/2018 Trace* Negative Final   • Micro Urine Req 11/01/2018 Microscopic   Final   • PT 11/01/2018 13.7  12.0 - 14.6 sec Final    Please note new reference range as of 10/30/2014 10:00 AM   • INR 11/01/2018 1.06  0.87 - 1.13 Final    Comment: Please note new reference range as of 10/30/2014 10:00  AM  INR - Non-therapeutic Reference Range: 0.87-1.13  INR - Therapeutic Reference Range: 2.0-4.0     • APTT 11/01/2018 39.0* 24.7 - 36.0 sec Final    Therapeutic Heparin Range: 63-96 seconds.   • GFR If  11/01/2018 >60  >60 mL/min/1.73 m 2 Final   • GFR If Non  11/01/2018 >60  >60 mL/min/1.73 m 2 Final   • WBC 11/01/2018 10-20* /hpf Final    Comment: Female  <12 Yr 0-2  >12 Yr 0-5  Male   None     • RBC 11/01/2018 Rare  /hpf Final    Comment: Female  >12 Yr 0-2  Male   None     • Bacteria 11/01/2018 Many* None /hpf Final   • Epithelial Cells 11/01/2018 Many* Few /hpf Final   • Mucous Threads 11/01/2018 Many  /hpf Final             Assessment and Plan:    History of left upper extremity DVT in the setting of line placement requiring brief anticoagulation.  Subsequent PE with history of chronic light smoking/implantable contraceptive disease.  She was on Xarelto before.  She subsequently became pregnant after removing the contraceptive disease and currently on Lovenox twice daily.  She is having some trouble with twice daily injection.  No significant family history.  Most probably she had provoked episode in the setting of smoking/contraceptive disease however given her young age, will screen her for factor V Leiden, prothrombin gene mutation and antiphospholipid antibody panel.    She is currently pregnant and has been switched to Lovenox twice daily.  If she continues to have trouble with twice daily injection, consider switching to once a day Lovenox with anti-factor Xa level monitoring, which even otherwise is recommended for pregnant patient.  I will refer her to anticoagulation clinic to establish long-term care.  Her OB/GYN is planning to switch her to heparin around her delivery time.  She is planning to switch to oral anticoagulation after her delivery in which case Coumadin is the recommended option as DOACs not usually recommended during breast-feeding.  However, if she is  not breast-feeding, she can be switched to DOACs.     Strongly counseled smoking cessation and avoidance of OCPs.  She is thinking about tubal ligation after her pregnancy.  She will need to be on anticoagulation for at least 2-3 months postpartum or even longer.  If she continues to actively smoke after that she will need extended anticoagulation.    She agreed and verbalized  agreement and understanding with the current plan.  I answered all questions and concerns at this time         Please note that this dictation was created using voice recognition software. I have made every reasonable attempt to correct obvious errors, but I expect that there are errors of grammar and possibly content that I did not discover before finalizing the note.      SIGNATURES:  Farhat Sherman    CC:  Hallie Norris M.D.  No ref. provider found

## 2019-01-23 ENCOUNTER — HOSPITAL ENCOUNTER (OUTPATIENT)
Dept: LAB | Facility: MEDICAL CENTER | Age: 34
End: 2019-01-23
Attending: INTERNAL MEDICINE
Payer: COMMERCIAL

## 2019-01-23 DIAGNOSIS — I26.99 OTHER ACUTE PULMONARY EMBOLISM WITHOUT ACUTE COR PULMONALE (HCC): ICD-10-CM

## 2019-01-23 PROCEDURE — 86147 CARDIOLIPIN ANTIBODY EA IG: CPT | Mod: 91

## 2019-01-23 PROCEDURE — 81240 F2 GENE: CPT

## 2019-01-23 PROCEDURE — 36415 COLL VENOUS BLD VENIPUNCTURE: CPT

## 2019-01-23 PROCEDURE — 81241 F5 GENE: CPT

## 2019-01-23 PROCEDURE — 86038 ANTINUCLEAR ANTIBODIES: CPT

## 2019-01-25 LAB
CARDIOLIPIN IGA SER IA-ACNC: 0 APL (ref 0–11)
CARDIOLIPIN IGG SER IA-ACNC: 0 GPL (ref 0–14)
CARDIOLIPIN IGM SER IA-ACNC: 0 MPL (ref 0–12)
NUCLEAR IGG SER QL IA: NORMAL

## 2019-01-27 LAB
F2 C.20210G>A GENO BLD/T: NEGATIVE
F5 P.R506Q BLD/T QL: ABNORMAL

## 2019-02-01 ENCOUNTER — TELEPHONE (OUTPATIENT)
Dept: HEMATOLOGY ONCOLOGY | Facility: MEDICAL CENTER | Age: 34
End: 2019-02-01

## 2019-02-01 NOTE — TELEPHONE ENCOUNTER
"Rn called pt to inform her of Dr. Sherman's note:     \"Let patient know that her blood work showed 1 out of 2 copies of factor V Leiden mutation which does increase her risk of blood clots.  No change in plans for anticoagulation\"    I left a voice message for her to call back.  No information was left on recording.  "

## 2019-02-01 NOTE — TELEPHONE ENCOUNTER
When pt calls back, please inform her that she needs to make an appt [facility]. ([phone])

## 2019-02-05 ENCOUNTER — ANTICOAGULATION VISIT (OUTPATIENT)
Dept: VASCULAR LAB | Facility: MEDICAL CENTER | Age: 34
End: 2019-02-05
Attending: INTERNAL MEDICINE
Payer: COMMERCIAL

## 2019-02-05 DIAGNOSIS — D68.59 HYPERCOAGULABLE STATE (HCC): ICD-10-CM

## 2019-02-05 DIAGNOSIS — I26.99 OTHER PULMONARY EMBOLISM WITHOUT ACUTE COR PULMONALE, UNSPECIFIED CHRONICITY (HCC): ICD-10-CM

## 2019-02-05 PROCEDURE — 99213 OFFICE O/P EST LOW 20 MIN: CPT

## 2019-02-06 ENCOUNTER — TELEPHONE (OUTPATIENT)
Dept: VASCULAR LAB | Facility: MEDICAL CENTER | Age: 34
End: 2019-02-06

## 2019-02-06 NOTE — PROGRESS NOTES
"  Anticoagulation Patient Findings  Patient Findings     Negatives:   Signs/symptoms of thrombosis, Signs/symptoms of bleeding, Laboratory test error suspected, Change in health, Change in alcohol use, Change in activity, Upcoming invasive procedure, Emergency department visit, Upcoming dental procedure, Missed doses, Extra doses, Change in medications, Change in diet/appetite, Hospital admission, Bruising, Other complaints        HPI:  Patient has hx of left upper extremity DVT in April 2015 successfully treated with brief anticoagulation. She later developed another subsequent PE which was attributed to chronic smoking and implantable contraceptive device in July 2018. She was treated with Xarelto and the contraceptive device removed.   Patient became pregnant and has since been on Lovenox injections.  HAS_BLED= 0  Patient was seen by Dr. Farhat Sherman who screened her for factpr V Leiden, prothrombin gene mutation and antiphospholipid antibody panel. His results found that   \"her blood work showed 1 out of 2 copies of factor V Leiden mutation which does increase her risk of blood clots\".   Patient also reported Father had hx of clots and stroke at young age.     A/P   Patient will continue with Lovenox injections 80mg BID and OB?GYN is planning to switch her to heparin around her delivery and then switching to an oral anticoagulant after delivery.   Patient given lab request to have Anti Xa level monitoring done.   Patient will follow up again in 2 weeks to review results and then likely monthly thereafter.     Next appt: Tues, Feb 19, 2019  10:30am    Mina Ferrari  PharmD      "

## 2019-02-07 ENCOUNTER — HOSPITAL ENCOUNTER (OUTPATIENT)
Dept: LAB | Facility: MEDICAL CENTER | Age: 34
End: 2019-02-07
Attending: OBSTETRICS & GYNECOLOGY
Payer: COMMERCIAL

## 2019-02-07 ENCOUNTER — HOSPITAL ENCOUNTER (OUTPATIENT)
Dept: LAB | Facility: MEDICAL CENTER | Age: 34
End: 2019-02-07
Attending: NURSE PRACTITIONER
Payer: COMMERCIAL

## 2019-02-07 DIAGNOSIS — I26.99 OTHER PULMONARY EMBOLISM WITHOUT ACUTE COR PULMONALE, UNSPECIFIED CHRONICITY (HCC): ICD-10-CM

## 2019-02-07 LAB
ERYTHROCYTE [DISTWIDTH] IN BLOOD BY AUTOMATED COUNT: 45.7 FL (ref 35.9–50)
GLUCOSE 1H P 50 G GLC PO SERPL-MCNC: 154 MG/DL (ref 70–139)
HCT VFR BLD AUTO: 39.5 % (ref 37–47)
HGB BLD-MCNC: 13.2 G/DL (ref 12–16)
MCH RBC QN AUTO: 31.8 PG (ref 27–33)
MCHC RBC AUTO-ENTMCNC: 33.4 G/DL (ref 33.6–35)
MCV RBC AUTO: 95.2 FL (ref 81.4–97.8)
PLATELET # BLD AUTO: 242 K/UL (ref 164–446)
PMV BLD AUTO: 12.1 FL (ref 9–12.9)
RBC # BLD AUTO: 4.15 M/UL (ref 4.2–5.4)
TREPONEMA PALLIDUM IGG+IGM AB [PRESENCE] IN SERUM OR PLASMA BY IMMUNOASSAY: NON REACTIVE
UFH PPP CHRO-ACNC: 1 U/ML
WBC # BLD AUTO: 11.5 K/UL (ref 4.8–10.8)

## 2019-02-07 PROCEDURE — 86780 TREPONEMA PALLIDUM: CPT

## 2019-02-07 PROCEDURE — 85027 COMPLETE CBC AUTOMATED: CPT

## 2019-02-07 PROCEDURE — 36415 COLL VENOUS BLD VENIPUNCTURE: CPT

## 2019-02-07 PROCEDURE — 82950 GLUCOSE TEST: CPT

## 2019-02-07 PROCEDURE — 85520 HEPARIN ASSAY: CPT

## 2019-02-07 NOTE — TELEPHONE ENCOUNTER
Initial anti-coag elation clinic note and most recent hematology note reviewed  Based on hematology recommendations, we will continue with Lovenox until nearing her delivery  Anticoagulation will need to be continued for a few months after delivery as per hematology recommendations    Follow-up with anti-coagulation clinic and hematology as scheduled    Michael J. Bloch, MD  Antico[facility] Center    Cc: Dr Sherman

## 2019-02-11 ENCOUNTER — APPOINTMENT (OUTPATIENT)
Dept: ADMISSIONS | Facility: MEDICAL CENTER | Age: 34
End: 2019-02-11
Attending: SURGERY
Payer: COMMERCIAL

## 2019-02-11 DIAGNOSIS — Z01.812 PRE-OPERATIVE LABORATORY EXAMINATION: ICD-10-CM

## 2019-02-11 NOTE — DISCHARGE PLANNING
Called and spoke with Dr. Grzegorz Colunga regarding what orders she would like to submit for fetal monitoring before , during or after her Ventral hernia repair. Patient is 24 weeks pregnant. She stated that she would call [facility] and give them the orders for FHT monitoring. Advised Cherise, Supervisor of Manjit pre/post op of our conversation.

## 2019-02-11 NOTE — OR NURSING
1430: Called and spoke with Dr. Grzegorz Colunga regarding what orders she would like to submit for fetal monitoring before , during or after her Ventral hernia repair. Patient is 24 weeks pregnant. She stated that she would call [facility] and give them the orders for FHT monitoring. Advised Cherise, Supervisor of Manjit pre/post op of our conversation.  1500: Received a call from Dr. Sky stating that she had written orders for FHT monitoring on a prescription pad wanted to fax them. Requested she fax them directly to me. Orders received, scanned into Media tab and placed on paper chart. She stated that she had spoke with L&D and to call them 1 hour prior to OR to have FTH checked.

## 2019-02-12 ENCOUNTER — HOSPITAL ENCOUNTER (OUTPATIENT)
Facility: MEDICAL CENTER | Age: 34
End: 2019-02-12
Attending: SURGERY | Admitting: SURGERY
Payer: COMMERCIAL

## 2019-02-12 VITALS
HEIGHT: 62 IN | TEMPERATURE: 98 F | OXYGEN SATURATION: 98 % | BODY MASS INDEX: 32.13 KG/M2 | HEART RATE: 78 BPM | WEIGHT: 174.6 LBS | RESPIRATION RATE: 16 BRPM

## 2019-02-12 DIAGNOSIS — G89.18 POSTOPERATIVE PAIN: ICD-10-CM

## 2019-02-12 DIAGNOSIS — K43.9 VENTRAL HERNIA WITHOUT OBSTRUCTION OR GANGRENE: ICD-10-CM

## 2019-02-12 PROCEDURE — 700101 HCHG RX REV CODE 250

## 2019-02-12 PROCEDURE — A6402 STERILE GAUZE <= 16 SQ IN: HCPCS | Performed by: SURGERY

## 2019-02-12 PROCEDURE — 160036 HCHG PACU - EA ADDL 30 MINS PHASE I: Performed by: SURGERY

## 2019-02-12 PROCEDURE — 160009 HCHG ANES TIME/MIN: Performed by: SURGERY

## 2019-02-12 PROCEDURE — 500002 HCHG ADHESIVE, DERMABOND: Performed by: SURGERY

## 2019-02-12 PROCEDURE — A9270 NON-COVERED ITEM OR SERVICE: HCPCS | Performed by: ANESTHESIOLOGY

## 2019-02-12 PROCEDURE — 160025 RECOVERY II MINUTES (STATS): Performed by: SURGERY

## 2019-02-12 PROCEDURE — 160046 HCHG PACU - 1ST 60 MINS PHASE II: Performed by: SURGERY

## 2019-02-12 PROCEDURE — 700102 HCHG RX REV CODE 250 W/ 637 OVERRIDE(OP): Performed by: ANESTHESIOLOGY

## 2019-02-12 PROCEDURE — 160035 HCHG PACU - 1ST 60 MINS PHASE I: Performed by: SURGERY

## 2019-02-12 PROCEDURE — 160027 HCHG SURGERY MINUTES - 1ST 30 MINS LEVEL 2: Performed by: SURGERY

## 2019-02-12 PROCEDURE — 700111 HCHG RX REV CODE 636 W/ 250 OVERRIDE (IP)

## 2019-02-12 PROCEDURE — 160038 HCHG SURGERY MINUTES - EA ADDL 1 MIN LEVEL 2: Performed by: SURGERY

## 2019-02-12 PROCEDURE — 700111 HCHG RX REV CODE 636 W/ 250 OVERRIDE (IP): Performed by: ANESTHESIOLOGY

## 2019-02-12 PROCEDURE — 501838 HCHG SUTURE GENERAL: Performed by: SURGERY

## 2019-02-12 PROCEDURE — 160002 HCHG RECOVERY MINUTES (STAT): Performed by: SURGERY

## 2019-02-12 PROCEDURE — 160048 HCHG OR STATISTICAL LEVEL 1-5: Performed by: SURGERY

## 2019-02-12 RX ORDER — OXYCODONE AND ACETAMINOPHEN 7.5; 325 MG/1; MG/1
1 TABLET ORAL EVERY 6 HOURS PRN
Qty: 16 TAB | Refills: 0 | Status: SHIPPED | OUTPATIENT
Start: 2019-02-12 | End: 2019-02-19

## 2019-02-12 RX ORDER — DIPHENHYDRAMINE HYDROCHLORIDE 50 MG/ML
12.5 INJECTION INTRAMUSCULAR; INTRAVENOUS
Status: DISCONTINUED | OUTPATIENT
Start: 2019-02-12 | End: 2019-02-12 | Stop reason: HOSPADM

## 2019-02-12 RX ORDER — PROMETHAZINE HYDROCHLORIDE 25 MG/1
25 TABLET ORAL EVERY 6 HOURS PRN
Qty: 6 TAB | Refills: 0 | Status: ON HOLD | OUTPATIENT
Start: 2019-02-12 | End: 2019-05-14

## 2019-02-12 RX ORDER — OXYCODONE HCL 5 MG/5 ML
5 SOLUTION, ORAL ORAL
Status: COMPLETED | OUTPATIENT
Start: 2019-02-12 | End: 2019-02-12

## 2019-02-12 RX ORDER — HYDROMORPHONE HYDROCHLORIDE 1 MG/ML
0.4 INJECTION, SOLUTION INTRAMUSCULAR; INTRAVENOUS; SUBCUTANEOUS
Status: DISCONTINUED | OUTPATIENT
Start: 2019-02-12 | End: 2019-02-12 | Stop reason: HOSPADM

## 2019-02-12 RX ORDER — OXYCODONE HCL 5 MG/5 ML
10 SOLUTION, ORAL ORAL
Status: COMPLETED | OUTPATIENT
Start: 2019-02-12 | End: 2019-02-12

## 2019-02-12 RX ORDER — SODIUM CHLORIDE, SODIUM LACTATE, POTASSIUM CHLORIDE, CALCIUM CHLORIDE 600; 310; 30; 20 MG/100ML; MG/100ML; MG/100ML; MG/100ML
INJECTION, SOLUTION INTRAVENOUS CONTINUOUS
Status: DISCONTINUED | OUTPATIENT
Start: 2019-02-12 | End: 2019-02-12 | Stop reason: HOSPADM

## 2019-02-12 RX ORDER — MEPERIDINE HYDROCHLORIDE 25 MG/ML
12.5 INJECTION INTRAMUSCULAR; INTRAVENOUS; SUBCUTANEOUS
Status: DISCONTINUED | OUTPATIENT
Start: 2019-02-12 | End: 2019-02-12 | Stop reason: HOSPADM

## 2019-02-12 RX ORDER — HYDROMORPHONE HYDROCHLORIDE 1 MG/ML
0.2 INJECTION, SOLUTION INTRAMUSCULAR; INTRAVENOUS; SUBCUTANEOUS
Status: DISCONTINUED | OUTPATIENT
Start: 2019-02-12 | End: 2019-02-12 | Stop reason: HOSPADM

## 2019-02-12 RX ORDER — HYDROMORPHONE HYDROCHLORIDE 1 MG/ML
0.1 INJECTION, SOLUTION INTRAMUSCULAR; INTRAVENOUS; SUBCUTANEOUS
Status: DISCONTINUED | OUTPATIENT
Start: 2019-02-12 | End: 2019-02-12 | Stop reason: HOSPADM

## 2019-02-12 RX ORDER — BUPIVACAINE HYDROCHLORIDE AND EPINEPHRINE 5; 5 MG/ML; UG/ML
INJECTION, SOLUTION EPIDURAL; INTRACAUDAL; PERINEURAL
Status: DISCONTINUED | OUTPATIENT
Start: 2019-02-12 | End: 2019-02-12 | Stop reason: HOSPADM

## 2019-02-12 RX ADMIN — FENTANYL CITRATE 50 MCG: 50 INJECTION, SOLUTION INTRAMUSCULAR; INTRAVENOUS at 11:42

## 2019-02-12 RX ADMIN — FENTANYL CITRATE 50 MCG: 50 INJECTION, SOLUTION INTRAMUSCULAR; INTRAVENOUS at 11:27

## 2019-02-12 RX ADMIN — OXYCODONE HYDROCHLORIDE 10 MG: 5 SOLUTION ORAL at 11:09

## 2019-02-12 NOTE — DISCHARGE INSTRUCTIONS
ACTIVITY: Rest and take it easy for the first 24 hours.  A responsible adult is recommended to remain with you during that time.  It is normal to feel sleepy.  We encourage you to not do anything that requires balance, judgment or coordination.    MILD FLU-LIKE SYMPTOMS ARE NORMAL. YOU MAY EXPERIENCE GENERALIZED MUSCLE ACHES, THROAT IRRITATION, HEADACHE AND/OR SOME NAUSEA.    FOR 24 HOURS DO NOT:  Drive, operate machinery or run household appliances.  Drink beer or alcoholic beverages.   Make important decisions or sign legal documents.    SPECIAL INSTRUCTIONS: *Patient is to resume Lovenox injections at 1700 today.   Must ambulate at home every 30 mins while awake. DRINK PLENTY OF FLUIDS, CALL THE DOCTOR FOR ANY CONCERNS. SEE INSTRUCTION SHEET FOR HERNIA REPAIR.**    DIET: To avoid nausea, slowly advance diet as tolerated, avoiding spicy or greasy foods for the first day.  Add more substantial food to your diet according to your physician's instructions.  Babies can be fed formula or breast milk as soon as they are hungry.  INCREASE FLUIDS AND FIBER TO AVOID CONSTIPATION.    SURGICAL DRESSING/BATHING: *MAY SHOWER OR BATHE IN 48 HOURS, MAY REMOVE CLEAR DRESSING ON Friday. NO HOT TUBS, BATH TUBS, OR SWIMMING POOLS FOR TWO WEEKS.*    FOLLOW-UP APPOINTMENT:  A follow-up appointment should be arranged with your doctor in *CALL TO SCHEDULE**; call to schedule.    You should CALL YOUR PHYSICIAN if you develop:  Fever greater than 101 degrees F.  Pain not relieved by medication, or persistent nausea or vomiting.  Excessive bleeding (blood soaking through dressing) or unexpected drainage from the wound.  Extreme redness or swelling around the incision site, drainage of pus or foul smelling drainage.  Inability to urinate or empty your bladder within 8 hours.  Problems with breathing or chest pain.    You should call 911 if you develop problems with breathing or chest pain.  If you are unable to contact your doctor or  surgical center, you should go to the nearest emergency room or urgent care center.    Physician's telephone #: *DR MENON [phone]**    If any questions arise, call your doctor.  If your doctor is not available, please feel free to call the Surgical Center at [phone].  The Center is open Monday through Friday from 7AM to 7PM.  You can also call the HEALTH HOTLINE open 24 hours/day, 7 days/week and speak to a nurse at [phone], or toll free at [phone].    A registered nurse may call you a few days after your surgery to see how you are doing after your procedure.    MEDICATIONS: Resume taking daily medication.  Take prescribed pain medication with food.  If no medication is prescribed, you may take non-aspirin pain medication if needed.  PAIN MEDICATION CAN BE VERY CONSTIPATING.  Take a stool softener or laxative such as senokot, pericolace, or milk of magnesia if needed.    Prescription given for **PERCOCET AND PHENERGAN*.  Last pain medication given at *11:09AM**.    If your physician has prescribed pain medication that includes Acetaminophen (Tylenol), do not take additional Acetaminophen (Tylenol) while taking the prescribed medication.    Depression / Suicide Risk    As you are discharged from this [facility] facility, it is important to learn how to keep safe from harming yourself.    Recognize the warning signs:  · Abrupt changes in personality, positive or negative- including increase in energy   · Giving away possessions  · Change in eating patterns- significant weight changes-  positive or negative  · Change in sleeping patterns- unable to sleep or sleeping all the time   · Unwillingness or inability to communicate  · Depression  · Unusual sadness, discouragement and loneliness  · Talk of wanting to die  · Neglect of personal appearance   · Rebelliousness- reckless behavior  · Withdrawal from people/activities they love  · Confusion- inability to concentrate     If you or a loved one  observes any of these behaviors or has concerns about self-harm, here's what you can do:  · Talk about it- your feelings and reasons for harming yourself  · Remove any means that you might use to hurt yourself (examples: pills, rope, extension cords, firearm)  · Get professional help from the community (Mental Health, Substance Abuse, psychological counseling)  · Do not be alone:Call your Safe Contact- someone whom you trust who will be there for you.  · Call your local CRISIS HOTLINE 287-4696 or 472-904-0945  · Call your local Children's Mobile Crisis Response Team [place] (375) 850-8059 or www.P[employer]  · Call the toll free National Suicide Prevention Hotlines   · National Suicide Prevention Lifeline 573-619-PTUG (0333)  · National Hope Line Network 800-SUICIDE (359-8559)

## 2019-02-12 NOTE — OR NURSING
1120 Received report from CAIT Harris. Pain meds given, see MAR. VSS<    1135 Cough better, VSS< ice pack in place.    1142 Pt complains of pain, medicated. Taking sips of water, ice pack to abdomen     1150 L&D nurse in to listen for fetal heart tones.    1153 Report off to CAIT Harris. VSS, L&D nurse at .

## 2019-02-12 NOTE — OR NURSING
"1041 Pt transferred to PACU. Report received from OR RN and anesthesia. Appears to have no distress at this time. VS stable. Pt requiring suction for thick white secretions. L & D called to check fetal heart tones. Abdominal lap stab with dry gauze and Tegaderm, CDI.    1100 Pt tolerating ice chips at this time. Pt medicated per MAR.     1120 Handoff to CAIT Mueller for break coverage.     1150 Report back from CAIT Mueller. L&D RN at bedside.    1200 Pt given an ice pop. States pain is \"more tolerable. I can do dull and achy.\" Spouse at bedside.    1250 Pt and spouse educated on discharge instructions. Verbalized understanding. All questions answered. All belongings are with patient. Pt discharged home.    "

## 2019-02-12 NOTE — OR SURGEON
Immediate Post OP Note    PreOp Diagnosis: Ventral Hernia    PostOp Diagnosis: Same    Procedure(s):  VENTRAL HERNIA REPAIR-  WITH PRIMARY REPAIR - Wound Class: Clean    Surgeon(s):  Adria Bob M.D.    Anesthesiologist/Type of Anesthesia:GET  Anesthesiologist: Luca Bruner M.D./General    Surgical Staff:  Assistant: EMILY Portillo  Circulator: Jane Joseph R.N.  Scrub Person: Scott Sims    Specimens removed if any:  none    Estimated Blood Loss: none    Findings: 1 cm defect containing adipose tissue    Complications: none        2/12/2019 10:28 AM Adria Bob M.D.

## 2019-02-12 NOTE — OP REPORT
DATE OF SERVICE:  02/12/2019    PREOPERATIVE DIAGNOSES:  Ventral hernia and 26 weeks' gravid pregnancy.    POSTOPERATIVE DIAGNOSES:  Ventral hernia and 26 weeks' gravid pregnancy.    PROCEDURE:  Primary ventral hernia repair.    ANESTHESIA:  General endotracheal.    ANESTHESIOLOGIST:  Luca Bruner MD    SURGEON:  Adria Bob MD    FIRST ASSISTANT:  HILLARY Wilson    INDICATIONS:  Patient is currently 26 weeks pregnant, but has a long   symptomatic hernia that keeps popping out and causing discomfort.    OPERATIVE FINDINGS:  A 1 cm defect with a mushroom shaped adipose component.    OPERATIVE NOTE:  As follows, the patient was taken to the operating room,   placed in supine position, given general endotracheal anesthesia.  Once   properly anesthetized, was prepped and draped in usual sterile fashion.  Over   the palpable mass, a vertical midline incision was made after the skin had   been anesthetized with 0.5% Marcaine with epinephrine.  Incision was carried   down through the skin and subcutaneous tissue.  Dissecting down, the hernia   was encountered and was dissected out from the subcutaneous tissue.  There, it   was shaped like a mushroom with a large bow, but small stalk.  This was all   dissected free, clamped and tied off with two 2-0 Vicryl ties and amputated   and then reduced.  This revealed a 1 cm defect.  She was then closed with   interrupted 0 Ethibond to seal the defect.  The subcutaneous tissues   reapproximated with 3-0 Vicryls and the skin was closed with a 4-0 Vicryl   subcuticular closure and Dermabond glue.  Sterile dressings were applied.    Patient tolerated the procedure well.  There were no apparent complications.    Lap, sponge and instrument counts were correct.       ____________________________________     Adria Bob MD    PMS / NTS    DD:  02/12/2019 10:46:22  DT:  02/12/2019 10:56:28    D#:  [id number]  Job#:  [id number]

## 2019-02-12 NOTE — PROGRESS NOTES
1200- Doppler completed on pt post surgery in PACU. 120 bpm for 1 min. Pt tolerated procedure well.

## 2019-02-19 ENCOUNTER — ANTICOAGULATION VISIT (OUTPATIENT)
Dept: VASCULAR LAB | Facility: MEDICAL CENTER | Age: 34
End: 2019-02-19
Attending: INTERNAL MEDICINE
Payer: COMMERCIAL

## 2019-02-19 VITALS — HEART RATE: 93 BPM | SYSTOLIC BLOOD PRESSURE: 118 MMHG | DIASTOLIC BLOOD PRESSURE: 75 MMHG

## 2019-02-19 DIAGNOSIS — D68.59 HYPERCOAGULABLE STATE (HCC): ICD-10-CM

## 2019-02-19 NOTE — PROGRESS NOTES
Anticoagulation Summary  As of 2/19/2019    INR goal:      TTR:   --   INR used for dosing:      Warfarin maintenance plan:   No maintenance plan   Next INR check:   3/19/2019   Target end date:       Indications    Hypercoagulable state (HCC) [D68.59]  Pulmonary embolism (HCC) [I26.99]             Anticoagulation Episode Summary     INR check location:       Preferred lab:       Send INR reminders to:       Comments:         Anticoagulation Care Providers     Provider Role Specialty Phone number    [facility] Responsible  [phone]        Anticoagulation Patient Findings    Pt presented for follow up with enoxaparin use in pregnancy.  Complains of bruising with medication but it is controlled an improving.  Reports high adherence.   No other s/s of bleeding.  120 mg enoxaparin is temporarily not available.   Pt reports she only misses doses because she is exhausted from all the injections.     A/P  Pt will check with pharmacy weekly to see if enoxaparin 120 mg can be ordered.   -Once it is available, suggest switching to once daily formulation.  Continue with current regimen (Anti Xa level of 1 and was drawn almost exactly 4 hours after injection)    Follow up in one month.  Anti-Xa ordered.    Celio Trent, PharmD

## 2019-02-27 ENCOUNTER — HOSPITAL ENCOUNTER (OUTPATIENT)
Dept: LAB | Facility: MEDICAL CENTER | Age: 34
End: 2019-02-27
Attending: OBSTETRICS & GYNECOLOGY
Payer: COMMERCIAL

## 2019-02-27 LAB
GLUCOSE 1H P CHAL SERPL-MCNC: 169 MG/DL (ref 65–180)
GLUCOSE 2H P CHAL SERPL-MCNC: 145 MG/DL (ref 65–155)
GLUCOSE 3H P CHAL SERPL-MCNC: 97 MG/DL (ref 65–140)
GLUCOSE BS SERPL-MCNC: 76 MG/DL (ref 65–95)

## 2019-02-27 PROCEDURE — 82951 GLUCOSE TOLERANCE TEST (GTT): CPT

## 2019-02-27 PROCEDURE — 36415 COLL VENOUS BLD VENIPUNCTURE: CPT

## 2019-02-27 PROCEDURE — 82952 GTT-ADDED SAMPLES: CPT

## 2019-03-19 ENCOUNTER — APPOINTMENT (OUTPATIENT)
Dept: VASCULAR LAB | Facility: MEDICAL CENTER | Age: 34
End: 2019-03-19
Payer: COMMERCIAL

## 2019-03-20 ENCOUNTER — TELEPHONE (OUTPATIENT)
Dept: VASCULAR LAB | Facility: MEDICAL CENTER | Age: 34
End: 2019-03-20

## 2019-03-20 NOTE — TELEPHONE ENCOUNTER
Patient cxl'd appt for anticoag f/u today and did not r/s  Will ask clinical pharmacists to call patient to arrange for f/u asap.    Michael Bloch, MD  Anticoagulation Clinic    Cc:

## 2019-03-27 ENCOUNTER — HOSPITAL ENCOUNTER (OUTPATIENT)
Dept: LAB | Facility: MEDICAL CENTER | Age: 34
End: 2019-03-27
Attending: OBSTETRICS & GYNECOLOGY
Payer: COMMERCIAL

## 2019-03-27 LAB — FIBRONECTIN FETAL SPEC QL: NEGATIVE

## 2019-03-27 PROCEDURE — 82731 ASSAY OF FETAL FIBRONECTIN: CPT

## 2019-04-01 ENCOUNTER — TELEPHONE (OUTPATIENT)
Dept: HEMATOLOGY ONCOLOGY | Facility: MEDICAL CENTER | Age: 34
End: 2019-04-01

## 2019-04-02 ENCOUNTER — TELEPHONE (OUTPATIENT)
Dept: MEDICAL GROUP | Facility: MEDICAL CENTER | Age: 34
End: 2019-04-02

## 2019-04-02 ENCOUNTER — HOSPITAL ENCOUNTER (OUTPATIENT)
Facility: MEDICAL CENTER | Age: 34
End: 2019-04-02
Attending: OBSTETRICS & GYNECOLOGY | Admitting: OBSTETRICS & GYNECOLOGY
Payer: COMMERCIAL

## 2019-04-02 VITALS
DIASTOLIC BLOOD PRESSURE: 79 MMHG | HEART RATE: 85 BPM | TEMPERATURE: 98.2 F | BODY MASS INDEX: 32.39 KG/M2 | SYSTOLIC BLOOD PRESSURE: 123 MMHG | HEIGHT: 62 IN | WEIGHT: 176 LBS

## 2019-04-02 PROCEDURE — 59025 FETAL NON-STRESS TEST: CPT

## 2019-04-02 NOTE — TELEPHONE ENCOUNTER
Spoke with pt on the phone regarding cancelled coag appt. She was sick. Rescheduled for 4/8, she will get Xa 4/4 prior to appt.     Nanci Dempsey, GarretD

## 2019-04-03 NOTE — PROGRESS NOTES
1931- Pt presented to labor unit with UC's since two weeks ago. States she had two gushes of discharge yesterday after intercourse. Denies VB. Confirms fetal movements. EFM and TOCO applied. POC discussed. Pt verbalized understanding.     1951- Dr. Agrawal notified of pt arrival. Reactive tracing noted. Orders received to sterile spec and if no pooling , SVE. If SVE less than 3-4cm, discharge home with FOB.     1955- Sterile spec done. No pooling visualized. SVE closed/thick/ high.  Pt abd soft and non tender to touch. Pt turned to left side. PO hydration given.     2025- Reactive NST noted. Discharge instructions given and explained. Pt and FOB verbalized understanding.     2029- Pt discharged with FOB undelivered.

## 2019-04-08 ENCOUNTER — APPOINTMENT (OUTPATIENT)
Dept: VASCULAR LAB | Facility: MEDICAL CENTER | Age: 34
End: 2019-04-08
Payer: COMMERCIAL

## 2019-04-09 ENCOUNTER — HOSPITAL ENCOUNTER (OUTPATIENT)
Dept: RADIOLOGY | Facility: MEDICAL CENTER | Age: 34
End: 2019-04-09
Attending: OBSTETRICS & GYNECOLOGY
Payer: COMMERCIAL

## 2019-04-09 DIAGNOSIS — Z34.83 PRENATAL CARE, SUBSEQUENT PREGNANCY, THIRD TRIMESTER: ICD-10-CM

## 2019-04-09 PROCEDURE — 76816 OB US FOLLOW-UP PER FETUS: CPT

## 2019-04-10 ENCOUNTER — APPOINTMENT (OUTPATIENT)
Dept: LAB | Facility: MEDICAL CENTER | Age: 34
End: 2019-04-10
Attending: NURSE PRACTITIONER
Payer: COMMERCIAL

## 2019-04-10 PROCEDURE — 36415 COLL VENOUS BLD VENIPUNCTURE: CPT

## 2019-04-10 PROCEDURE — 85520 HEPARIN ASSAY: CPT

## 2019-04-12 ENCOUNTER — TELEPHONE (OUTPATIENT)
Dept: VASCULAR LAB | Facility: MEDICAL CENTER | Age: 34
End: 2019-04-12

## 2019-04-12 NOTE — TELEPHONE ENCOUNTER
[facility]    Called patient to follow up on missed appt on 4/11/19.  N/A, but left patient VM asking patient to return call to reschedule her appt.     Mina CombsD

## 2019-04-16 ENCOUNTER — TELEPHONE (OUTPATIENT)
Dept: MEDICAL GROUP | Facility: MEDICAL CENTER | Age: 34
End: 2019-04-16

## 2019-04-16 NOTE — TELEPHONE ENCOUNTER
[facility] [place] [facility]     Called patient to follow up on missed appt on 4/11/19.  N/A, but left patient VM asking patient to return call to reschedule her appt    Nanci Dempsey, Pharm D

## 2019-04-18 ENCOUNTER — TELEPHONE (OUTPATIENT)
Dept: VASCULAR LAB | Facility: MEDICAL CENTER | Age: 34
End: 2019-04-18

## 2019-04-18 LAB — STREP GP B DNA PCR: POSITIVE

## 2019-04-18 NOTE — TELEPHONE ENCOUNTER
Left another msg for pt to call back to get rescheduled to be seen in clinic for missed appt.     Mercy Fregoso, PharmD

## 2019-04-22 ENCOUNTER — TELEPHONE (OUTPATIENT)
Dept: VASCULAR LAB | Facility: MEDICAL CENTER | Age: 34
End: 2019-04-22

## 2019-04-22 NOTE — TELEPHONE ENCOUNTER
Left another msg for pt to call back to get rescheduled to be seen in clinic for missed appt.     Nanci Dempsey, Pharm D

## 2019-04-30 ENCOUNTER — TELEPHONE (OUTPATIENT)
Dept: VASCULAR LAB | Facility: MEDICAL CENTER | Age: 34
End: 2019-04-30

## 2019-04-30 NOTE — TELEPHONE ENCOUNTER
[facility]      for pt to call clinic and establish care.  Sent letter of compliance.    Celio Trent, PharmD

## 2019-05-14 ENCOUNTER — ANESTHESIA (OUTPATIENT)
Dept: OBGYN | Facility: MEDICAL CENTER | Age: 34
End: 2019-05-14
Payer: COMMERCIAL

## 2019-05-14 ENCOUNTER — ANESTHESIA EVENT (OUTPATIENT)
Dept: OBGYN | Facility: MEDICAL CENTER | Age: 34
End: 2019-05-14
Payer: COMMERCIAL

## 2019-05-14 ENCOUNTER — HOSPITAL ENCOUNTER (INPATIENT)
Facility: MEDICAL CENTER | Age: 34
LOS: 2 days | End: 2019-05-16
Attending: OBSTETRICS & GYNECOLOGY | Admitting: OBSTETRICS & GYNECOLOGY
Payer: COMMERCIAL

## 2019-05-14 ENCOUNTER — APPOINTMENT (OUTPATIENT)
Dept: OBGYN | Facility: MEDICAL CENTER | Age: 34
End: 2019-05-14
Attending: OBSTETRICS & GYNECOLOGY
Payer: COMMERCIAL

## 2019-05-14 DIAGNOSIS — I26.99 OTHER PULMONARY EMBOLISM WITHOUT ACUTE COR PULMONALE, UNSPECIFIED CHRONICITY (HCC): ICD-10-CM

## 2019-05-14 LAB
ALBUMIN SERPL BCP-MCNC: 3.3 G/DL (ref 3.2–4.9)
ALBUMIN/GLOB SERPL: 1.3 G/DL
ALP SERPL-CCNC: 115 U/L (ref 30–99)
ALT SERPL-CCNC: 7 U/L (ref 2–50)
ANION GAP SERPL CALC-SCNC: 11 MMOL/L (ref 0–11.9)
AST SERPL-CCNC: 11 U/L (ref 12–45)
BASOPHILS # BLD AUTO: 0.2 % (ref 0–1.8)
BASOPHILS # BLD: 0.02 K/UL (ref 0–0.12)
BILIRUB SERPL-MCNC: 0.3 MG/DL (ref 0.1–1.5)
BUN SERPL-MCNC: 6 MG/DL (ref 8–22)
CALCIUM SERPL-MCNC: 9.3 MG/DL (ref 8.5–10.5)
CHLORIDE SERPL-SCNC: 108 MMOL/L (ref 96–112)
CO2 SERPL-SCNC: 18 MMOL/L (ref 20–33)
CREAT SERPL-MCNC: 0.41 MG/DL (ref 0.5–1.4)
EOSINOPHIL # BLD AUTO: 0.13 K/UL (ref 0–0.51)
EOSINOPHIL NFR BLD: 1.1 % (ref 0–6.9)
ERYTHROCYTE [DISTWIDTH] IN BLOOD BY AUTOMATED COUNT: 46.4 FL (ref 35.9–50)
GLOBULIN SER CALC-MCNC: 2.5 G/DL (ref 1.9–3.5)
GLUCOSE SERPL-MCNC: 105 MG/DL (ref 65–99)
HCT VFR BLD AUTO: 37.8 % (ref 37–47)
HGB BLD-MCNC: 12.9 G/DL (ref 12–16)
HOLDING TUBE BB 8507: NORMAL
IMM GRANULOCYTES # BLD AUTO: 0.12 K/UL (ref 0–0.11)
IMM GRANULOCYTES NFR BLD AUTO: 1 % (ref 0–0.9)
LYMPHOCYTES # BLD AUTO: 2.82 K/UL (ref 1–4.8)
LYMPHOCYTES NFR BLD: 23.5 % (ref 22–41)
MCH RBC QN AUTO: 32.1 PG (ref 27–33)
MCHC RBC AUTO-ENTMCNC: 34.1 G/DL (ref 33.6–35)
MCV RBC AUTO: 94 FL (ref 81.4–97.8)
MONOCYTES # BLD AUTO: 1.07 K/UL (ref 0–0.85)
MONOCYTES NFR BLD AUTO: 8.9 % (ref 0–13.4)
NEUTROPHILS # BLD AUTO: 7.86 K/UL (ref 2–7.15)
NEUTROPHILS NFR BLD: 65.3 % (ref 44–72)
NRBC # BLD AUTO: 0 K/UL
NRBC BLD-RTO: 0 /100 WBC
PLATELET # BLD AUTO: 187 K/UL (ref 164–446)
PMV BLD AUTO: 11.5 FL (ref 9–12.9)
POTASSIUM SERPL-SCNC: 3.5 MMOL/L (ref 3.6–5.5)
PROT SERPL-MCNC: 5.8 G/DL (ref 6–8.2)
RBC # BLD AUTO: 4.02 M/UL (ref 4.2–5.4)
SODIUM SERPL-SCNC: 137 MMOL/L (ref 135–145)
WBC # BLD AUTO: 12 K/UL (ref 4.8–10.8)

## 2019-05-14 PROCEDURE — 700105 HCHG RX REV CODE 258: Performed by: OBSTETRICS & GYNECOLOGY

## 2019-05-14 PROCEDURE — 700111 HCHG RX REV CODE 636 W/ 250 OVERRIDE (IP): Performed by: ANESTHESIOLOGY

## 2019-05-14 PROCEDURE — 700111 HCHG RX REV CODE 636 W/ 250 OVERRIDE (IP)

## 2019-05-14 PROCEDURE — 700101 HCHG RX REV CODE 250

## 2019-05-14 PROCEDURE — 700102 HCHG RX REV CODE 250 W/ 637 OVERRIDE(OP): Performed by: OBSTETRICS & GYNECOLOGY

## 2019-05-14 PROCEDURE — 59409 OBSTETRICAL CARE: CPT

## 2019-05-14 PROCEDURE — 303615 HCHG EPIDURAL/SPINAL ANESTHESIA FOR LABOR

## 2019-05-14 PROCEDURE — 10907ZC DRAINAGE OF AMNIOTIC FLUID, THERAPEUTIC FROM PRODUCTS OF CONCEPTION, VIA NATURAL OR ARTIFICIAL OPENING: ICD-10-PCS | Performed by: OBSTETRICS & GYNECOLOGY

## 2019-05-14 PROCEDURE — 80053 COMPREHEN METABOLIC PANEL: CPT

## 2019-05-14 PROCEDURE — 36415 COLL VENOUS BLD VENIPUNCTURE: CPT

## 2019-05-14 PROCEDURE — 304965 HCHG RECOVERY SERVICES

## 2019-05-14 PROCEDURE — 770002 HCHG ROOM/CARE - OB PRIVATE (112)

## 2019-05-14 PROCEDURE — 85025 COMPLETE CBC W/AUTO DIFF WBC: CPT

## 2019-05-14 PROCEDURE — A9270 NON-COVERED ITEM OR SERVICE: HCPCS | Performed by: OBSTETRICS & GYNECOLOGY

## 2019-05-14 PROCEDURE — 302135 SEQUENTIAL COMPRESSION MACHINE: Performed by: OBSTETRICS & GYNECOLOGY

## 2019-05-14 PROCEDURE — 700111 HCHG RX REV CODE 636 W/ 250 OVERRIDE (IP): Performed by: OBSTETRICS & GYNECOLOGY

## 2019-05-14 RX ORDER — ALUMINA, MAGNESIA, AND SIMETHICONE 2400; 2400; 240 MG/30ML; MG/30ML; MG/30ML
30 SUSPENSION ORAL EVERY 6 HOURS PRN
Status: DISCONTINUED | OUTPATIENT
Start: 2019-05-14 | End: 2019-05-14 | Stop reason: HOSPADM

## 2019-05-14 RX ORDER — ROPIVACAINE HYDROCHLORIDE 2 MG/ML
INJECTION, SOLUTION EPIDURAL; INFILTRATION; PERINEURAL
Status: COMPLETED
Start: 2019-05-14 | End: 2019-05-14

## 2019-05-14 RX ORDER — ZOLPIDEM TARTRATE 5 MG/1
10 TABLET ORAL NIGHTLY PRN
Status: DISCONTINUED | OUTPATIENT
Start: 2019-05-14 | End: 2019-05-16 | Stop reason: HOSPADM

## 2019-05-14 RX ORDER — MISOPROSTOL 200 UG/1
800 TABLET ORAL
Status: DISCONTINUED | OUTPATIENT
Start: 2019-05-14 | End: 2019-05-16 | Stop reason: HOSPADM

## 2019-05-14 RX ORDER — DOCUSATE SODIUM 100 MG/1
100 CAPSULE, LIQUID FILLED ORAL 2 TIMES DAILY PRN
Status: DISCONTINUED | OUTPATIENT
Start: 2019-05-14 | End: 2019-05-16 | Stop reason: HOSPADM

## 2019-05-14 RX ORDER — SODIUM CHLORIDE, SODIUM LACTATE, POTASSIUM CHLORIDE, CALCIUM CHLORIDE 600; 310; 30; 20 MG/100ML; MG/100ML; MG/100ML; MG/100ML
INJECTION, SOLUTION INTRAVENOUS CONTINUOUS
Status: DISPENSED | OUTPATIENT
Start: 2019-05-14 | End: 2019-05-14

## 2019-05-14 RX ORDER — OXYCODONE HYDROCHLORIDE AND ACETAMINOPHEN 5; 325 MG/1; MG/1
2 TABLET ORAL EVERY 4 HOURS PRN
Status: DISCONTINUED | OUTPATIENT
Start: 2019-05-14 | End: 2019-05-15

## 2019-05-14 RX ORDER — METOCLOPRAMIDE HYDROCHLORIDE 5 MG/ML
10 INJECTION INTRAMUSCULAR; INTRAVENOUS EVERY 6 HOURS PRN
Status: DISCONTINUED | OUTPATIENT
Start: 2019-05-14 | End: 2019-05-16 | Stop reason: HOSPADM

## 2019-05-14 RX ORDER — SODIUM CHLORIDE, SODIUM LACTATE, POTASSIUM CHLORIDE, CALCIUM CHLORIDE 600; 310; 30; 20 MG/100ML; MG/100ML; MG/100ML; MG/100ML
INJECTION, SOLUTION INTRAVENOUS PRN
Status: DISCONTINUED | OUTPATIENT
Start: 2019-05-14 | End: 2019-05-16 | Stop reason: HOSPADM

## 2019-05-14 RX ORDER — ACETAMINOPHEN 325 MG/1
325 TABLET ORAL EVERY 4 HOURS PRN
Status: DISCONTINUED | OUTPATIENT
Start: 2019-05-14 | End: 2019-05-16 | Stop reason: HOSPADM

## 2019-05-14 RX ORDER — ROPIVACAINE HYDROCHLORIDE 2 MG/ML
INJECTION, SOLUTION EPIDURAL; INFILTRATION PRN
Status: DISCONTINUED | OUTPATIENT
Start: 2019-05-14 | End: 2019-05-14 | Stop reason: SURG

## 2019-05-14 RX ORDER — HEPARIN SODIUM 5000 [USP'U]/ML
10000 INJECTION, SOLUTION INTRAVENOUS; SUBCUTANEOUS EVERY 12 HOURS
Status: DISCONTINUED | OUTPATIENT
Start: 2019-05-15 | End: 2019-05-16 | Stop reason: HOSPADM

## 2019-05-14 RX ORDER — CITRIC ACID/SODIUM CITRATE 334-500MG
30 SOLUTION, ORAL ORAL EVERY 6 HOURS PRN
Status: DISCONTINUED | OUTPATIENT
Start: 2019-05-14 | End: 2019-05-14 | Stop reason: HOSPADM

## 2019-05-14 RX ORDER — DEXTROSE, SODIUM CHLORIDE, SODIUM LACTATE, POTASSIUM CHLORIDE, AND CALCIUM CHLORIDE 5; .6; .31; .03; .02 G/100ML; G/100ML; G/100ML; G/100ML; G/100ML
INJECTION, SOLUTION INTRAVENOUS CONTINUOUS
Status: DISCONTINUED | OUTPATIENT
Start: 2019-05-14 | End: 2019-05-16 | Stop reason: HOSPADM

## 2019-05-14 RX ORDER — METHYLERGONOVINE MALEATE 0.2 MG/ML
0.2 INJECTION INTRAVENOUS
Status: DISCONTINUED | OUTPATIENT
Start: 2019-05-14 | End: 2019-05-14 | Stop reason: HOSPADM

## 2019-05-14 RX ORDER — MISOPROSTOL 200 UG/1
800 TABLET ORAL
Status: DISCONTINUED | OUTPATIENT
Start: 2019-05-14 | End: 2019-05-14 | Stop reason: HOSPADM

## 2019-05-14 RX ORDER — OXYCODONE HYDROCHLORIDE AND ACETAMINOPHEN 5; 325 MG/1; MG/1
1 TABLET ORAL EVERY 4 HOURS PRN
Status: DISCONTINUED | OUTPATIENT
Start: 2019-05-14 | End: 2019-05-16 | Stop reason: HOSPADM

## 2019-05-14 RX ORDER — VITAMIN A ACETATE, BETA CAROTENE, ASCORBIC ACID, CHOLECALCIFEROL, .ALPHA.-TOCOPHEROL ACETATE, DL-, THIAMINE MONONITRATE, RIBOFLAVIN, NIACINAMIDE, PYRIDOXINE HYDROCHLORIDE, FOLIC ACID, CYANOCOBALAMIN, CALCIUM CARBONATE, FERROUS FUMARATE, ZINC OXIDE, CUPRIC OXIDE 3080; 12; 120; 400; 1; 1.84; 3; 20; 22; 920; 25; 200; 27; 10; 2 [IU]/1; UG/1; MG/1; [IU]/1; MG/1; MG/1; MG/1; MG/1; MG/1; [IU]/1; MG/1; MG/1; MG/1; MG/1; MG/1
1 TABLET, FILM COATED ORAL EVERY MORNING
Status: DISCONTINUED | OUTPATIENT
Start: 2019-05-15 | End: 2019-05-16 | Stop reason: HOSPADM

## 2019-05-14 RX ADMIN — SODIUM CHLORIDE, POTASSIUM CHLORIDE, SODIUM LACTATE AND CALCIUM CHLORIDE: 600; 310; 30; 20 INJECTION, SOLUTION INTRAVENOUS at 14:48

## 2019-05-14 RX ADMIN — SODIUM CHLORIDE, POTASSIUM CHLORIDE, SODIUM LACTATE AND CALCIUM CHLORIDE: 600; 310; 30; 20 INJECTION, SOLUTION INTRAVENOUS at 14:03

## 2019-05-14 RX ADMIN — ZOLPIDEM TARTRATE 10 MG: 5 TABLET ORAL at 23:49

## 2019-05-14 RX ADMIN — ROPIVACAINE HYDROCHLORIDE 10 ML: 2 INJECTION, SOLUTION EPIDURAL; INFILTRATION at 14:42

## 2019-05-14 RX ADMIN — SODIUM CHLORIDE, POTASSIUM CHLORIDE, SODIUM LACTATE AND CALCIUM CHLORIDE: 600; 310; 30; 20 INJECTION, SOLUTION INTRAVENOUS at 07:41

## 2019-05-14 RX ADMIN — SODIUM CHLORIDE 2.5 MILLION UNITS: 9 INJECTION, SOLUTION INTRAVENOUS at 15:05

## 2019-05-14 RX ADMIN — Medication 125 ML/HR: at 20:09

## 2019-05-14 RX ADMIN — Medication 1 MILLI-UNITS/MIN: at 07:42

## 2019-05-14 RX ADMIN — SODIUM CHLORIDE 5 MILLION UNITS: 900 INJECTION INTRAVENOUS at 07:42

## 2019-05-14 RX ADMIN — ROPIVACAINE HYDROCHLORIDE 100 ML: 2 INJECTION, SOLUTION EPIDURAL; INFILTRATION at 14:40

## 2019-05-14 RX ADMIN — SODIUM CHLORIDE 2.5 MILLION UNITS: 9 INJECTION, SOLUTION INTRAVENOUS at 11:54

## 2019-05-14 ASSESSMENT — PATIENT HEALTH QUESTIONNAIRE - PHQ9
1. LITTLE INTEREST OR PLEASURE IN DOING THINGS: NOT AT ALL
1. LITTLE INTEREST OR PLEASURE IN DOING THINGS: NOT AT ALL
SUM OF ALL RESPONSES TO PHQ9 QUESTIONS 1 AND 2: 0
2. FEELING DOWN, DEPRESSED, IRRITABLE, OR HOPELESS: NOT AT ALL
2. FEELING DOWN, DEPRESSED, IRRITABLE, OR HOPELESS: NOT AT ALL
SUM OF ALL RESPONSES TO PHQ9 QUESTIONS 1 AND 2: 0

## 2019-05-14 ASSESSMENT — PAIN SCALES - GENERAL: PAIN_LEVEL: 0

## 2019-05-14 ASSESSMENT — COPD QUESTIONNAIRES
DO YOU EVER COUGH UP ANY MUCUS OR PHLEGM?: NO/ONLY WITH OCCASIONAL COLDS OR INFECTIONS
DURING THE PAST 4 WEEKS HOW MUCH DID YOU FEEL SHORT OF BREATH: NONE/LITTLE OF THE TIME
IN THE PAST 12 MONTHS DO YOU DO LESS THAN YOU USED TO BECAUSE OF YOUR BREATHING PROBLEMS: DISAGREE/UNSURE
COPD SCREENING SCORE: 0
HAVE YOU SMOKED AT LEAST 100 CIGARETTES IN YOUR ENTIRE LIFE: NO/DON'T KNOW

## 2019-05-14 ASSESSMENT — LIFESTYLE VARIABLES
EVER_SMOKED: NEVER
ALCOHOL_USE: NO

## 2019-05-14 NOTE — PROGRESS NOTES
Pt is a ; KAITLIN of ; making her 39w. Pt here for her elective IOL. IV started, labs drawn, VSS. SVE at /-2.     Report given to INDIGO Daniel RN.

## 2019-05-14 NOTE — ANESTHESIA PROCEDURE NOTES
Epidural Block  Performed by: ELOISE IVORY  Authorized by: ELOISE IVORY     Patient Location:  OB  Start Time:  5/14/2019 2:40 PM  Reason for Block: labor analgesia    patient identified, IV checked, site marked, risks and benefits discussed, surgical consent, monitors and equipment checked, pre-op evaluation and timeout performed    Patient Position:  Sitting  Prep: ChloraPrep, patient draped and sterile technique    Monitoring:  Blood pressure, continuous pulse oximetry and heart rate  Approach:  Midline  Location:  L4-L5  Skin infiltration:  Lidocaine  Strength:  1%  Dose:  0ml  Needle Type:  Tuohy  Needle Gauge:  17 G  Needle Length:  3.5 in  Loss of resistance::  7  Catheter Size:  20 G  Catheter at Skin Depth:  16  Test Dose:  Lidocaine 1.5% with epinephrine 1-to-200,000  Test Dose Result:  Negative

## 2019-05-14 NOTE — H&P
History and Physical      Kate Shelton is a 33 y.o. female  at 39w0d who presents for IOL     Subjective:   positive  For CTXS.   negative Feels pain   negative for LOF  negative for vaginal bleeding.   positive for fetal movement    ROS: A comprehensive review of systems was negative.    Past Medical History:   Diagnosis Date   • Anxiety    • Bipolar 1 disorder (HCC)    • Depression    • DVT of upper extremity (deep vein thrombosis) (HCC)     From IV   • DVT, lower extremity, recurrent (HCC)     hx left arm DVT at age 29   • GERD (gastroesophageal reflux disease)    • IBD (inflammatory bowel disease)    • Pregnant 2019    25weeks   • PTSD (post-traumatic stress disorder)    • PUD (peptic ulcer disease)      Past Surgical History:   Procedure Laterality Date   • VENTRAL HERNIA REPAIR N/A 2019    Procedure: VENTRAL HERNIA REPAIR-  WITH PRIMARY REPAIR;  Surgeon: Adria Bob M.D.;  Location: SURGERY SAME DAY [facility];  Service: General   • MAMMOPLASTY AUGMENTATION  2016     Family History   Problem Relation Age of Onset   • Psychiatry Mother    • Other Mother         killed in [place]   • Heart Disease Father    • Stroke Father      OB History    Para Term  AB Living   4 2       2   SAB TAB Ectopic Molar Multiple Live Births                    # Outcome Date GA Lbr Martín/2nd Weight Sex Delivery Anes PTL Lv   4 Current            3             2 Para            1 Para               Obstetric Comments   7 and 13     Social History     Social History   • Marital status:      Spouse name: N/A   • Number of children: N/A   • Years of education: N/A     Occupational History   • Not on file.     Social History Main Topics   • Smoking status: Former Smoker     Packs/day: 0.25     Years: 8.00     Types: Cigarettes     Quit date:    • Smokeless tobacco: Never Used   • Alcohol use No      Comment: occasionally   • Drug use: No   • Sexual activity: Yes     Partners: Male      Birth control/ protection: Implant     Other Topics Concern   • Not on file     Social History Narrative   • No narrative on file     Allergies: Nsaids and Ondansetron    Current Facility-Administered Medications:   •  LR infusion, , Intravenous, Continuous, Cristine Godfrey M.D., Last Rate: 125 mL/hr at 05/14/19 0741  •  D5LR infusion, , Intravenous, Continuous, Cristine Godfrey M.D.  •  fentaNYL (SUBLIMAZE) injection 50 mcg, 50 mcg, Intravenous, Q HOUR PRN, Cristine Godfrey M.D.  •  fentaNYL (SUBLIMAZE) injection 100 mcg, 100 mcg, Intravenous, Q HOUR PRN, Cristine Godfrey M.D.  •  mag hydrox-al hydrox-simeth (MAALOX PLUS ES or MYLANTA DS) suspension 30 mL, 30 mL, Oral, Q6HRS PRN, Cristine Godfrey M.D.  •  Sod Citrate-Citric Acid (BICITRA) 500-334 MG/5ML solution 30 mL, 30 mL, Oral, Q6HRS PRN, Cristine Godfrey M.D.  •  [COMPLETED] penicillin G potassium 5 Million Units in  mL IVPB, 5 Million Units, Intravenous, Once, Stopped at 05/14/19 0812 **FOLLOWED BY** penicillin G potassium 2.5 Million Units in  mL IVPB, 2.5 Million Units, Intravenous, Q4HRS, Cristine Godfrey M.D., Stopped at 05/14/19 1224  •  oxytocin (PITOCIN) infusion (for induction), 0.5-20 charis-units/min, Intravenous, Continuous, Cristine Godfrey M.D., Last Rate: 18 mL/hr at 05/14/19 1030, 6 charis-units/min at 05/14/19 1030  •  miSOPROStol (CYTOTEC) tablet 800 mcg, 800 mcg, Rectal, Once PRN, Cristine Godfrey M.D.  •  methylergonovine (METHERGINE) injection 0.2 mg, 0.2 mg, Intramuscular, Once PRN, Cristine Godfrey M.D.    Prenatal care with Dr Todd Colunga   Patient Active Problem List    Diagnosis Date Noted   • Encounter for post surgical wound check 08/27/2018   • Encounter for Nexplanon removal 08/14/2018   • Counseling for birth control, oral contraceptives 08/14/2018   • Pulmonary embolism (HCC)  "2018   • Hypercoagulable state (HCC) 2018   • Tobacco use disorder 2018   • Pharyngitis 2018   • Bilious vomiting with nausea 2018   • Dysuria 2018   • Obesity (BMI 30-39.9) 2018   • Acute vaginitis 2018   • Bipolar 1 disorder (HCC) 2018   • ERICA (generalized anxiety disorder) 2018   • PUD (peptic ulcer disease) 2018     Objective:      /59   Pulse 74   Temp 36.7 °C (98 °F) (Temporal)   Resp 16   Ht 1.575 m (5' 2.01\")   Wt 77.6 kg (171 lb)     General:   no acute distress, alert, cooperative   Skin:   normal   HEENT:  Sclera clear, anicteric   Lungs:   CTA bilateral   Heart:   S1, S2 normal, no murmur, click, rub or gallop, regular rate and rhythm   Abdomen:   gravid, NT   EFW:  3200   Pelvis:  adequate with gynecoid pelvis   FHT:  140 BPM   Uterine Size: S=D   Presentations: Cephalic   Cervix:     Dilation: 2cm    Effacement: 70%    Station:  -2    Consistency: Soft    Position: Middle     Lab Review  Lab:   Blood type: O+     Recent Results (from the past 5880 hour(s))   RUBELLA    Collection Time: 10/13/18 12:00 AM   Result Value Ref Range    Rubella IgG Antibody imm    HEPATITIS B SURFACE ANTIGEN    Collection Time: 10/13/18 12:00 AM   Result Value Ref Range    Hepatitis B Surface Antigen neg    OP PRENATAL PANEL-BLOOD BANK    Collection Time: 10/13/18 12:00 AM   Result Value Ref Range    ABO Grouping Only O+    EKG    Collection Time: 18 12:53 PM   Result Value Ref Range    Report       [facility] Emergency Dept.    Test Date:  2018  Pt Name:    ISIDRA LAMAR              Department: [facility]  MRN:        [id number]                      Room:  Gender:     Female                       Technician: MIRTA  :        [id number]                   Requested By:ER TRIAGE PROTOCOL  Order #:    [id number]                    Reading MD: CHARAN AQUINO MD    Measurements  Intervals                                " Axis  Rate:       85                           P:          34  TX:         136                          QRS:        40  QRSD:       86                           T:          7  QT:         372  QTc:        443    Interpretive Statements  SINUS RHYTHM at a rate of 85  Normal axis.  Good R wave progression.  No ST or T wave changes to indicate  ischemia or infarct  INFERIOR Q WAVES, nonpathologic  Compared to ECG 07/10/2018 06:19:04  No changes    Electronically Signed On 11-1-2018 16:26:26 PDT by CHARAN AQUINO MD     CBC WITH DIFFERENTIAL    Collection Time: 11/01/18  2:48 PM   Result Value Ref Range    WBC 10.2 4.8 - 10.8 K/uL    RBC 4.43 4.20 - 5.40 M/uL    Hemoglobin 14.1 12.0 - 16.0 g/dL    Hematocrit 40.3 37.0 - 47.0 %    MCV 91.0 81.4 - 97.8 fL    MCH 31.8 27.0 - 33.0 pg    MCHC 35.0 33.6 - 35.0 g/dL    RDW 41.8 35.9 - 50.0 fL    Platelet Count 246 164 - 446 K/uL    MPV 11.0 9.0 - 12.9 fL    Neutrophils-Polys 68.00 44.00 - 72.00 %    Lymphocytes 20.40 (L) 22.00 - 41.00 %    Monocytes 9.90 0.00 - 13.40 %    Eosinophils 1.10 0.00 - 6.90 %    Basophils 0.20 0.00 - 1.80 %    Immature Granulocytes 0.40 0.00 - 0.90 %    Nucleated RBC 0.00 /100 WBC    Neutrophils (Absolute) 6.96 2.00 - 7.15 K/uL    Lymphs (Absolute) 2.08 1.00 - 4.80 K/uL    Monos (Absolute) 1.01 (H) 0.00 - 0.85 K/uL    Eos (Absolute) 0.11 0.00 - 0.51 K/uL    Baso (Absolute) 0.02 0.00 - 0.12 K/uL    Immature Granulocytes (abs) 0.04 0.00 - 0.11 K/uL    NRBC (Absolute) 0.00 K/uL   COMP METABOLIC PANEL    Collection Time: 11/01/18  2:48 PM   Result Value Ref Range    Sodium 133 (L) 135 - 145 mmol/L    Potassium 3.2 (L) 3.6 - 5.5 mmol/L    Chloride 105 96 - 112 mmol/L    Co2 18 (L) 20 - 33 mmol/L    Anion Gap 10.0 0.0 - 11.9    Glucose 85 65 - 99 mg/dL    Bun 7 (L) 8 - 22 mg/dL    Creatinine 0.39 (L) 0.50 - 1.40 mg/dL    Calcium 8.7 8.4 - 10.2 mg/dL    AST(SGOT) 17 12 - 45 U/L    ALT(SGPT) 58 (H) 2 - 50 U/L    Alkaline Phosphatase 102 (H) 30 - 99 U/L     Total Bilirubin 0.6 0.1 - 1.5 mg/dL    Albumin 4.3 3.2 - 4.9 g/dL    Total Protein 7.1 6.0 - 8.2 g/dL    Globulin 2.8 1.9 - 3.5 g/dL    A-G Ratio 1.5 g/dL   LIPASE    Collection Time: 11/01/18  2:48 PM   Result Value Ref Range    Lipase 18 7 - 58 U/L   TROPONIN    Collection Time: 11/01/18  2:48 PM   Result Value Ref Range    Troponin I <0.02 0.00 - 0.04 ng/mL   BTYPE NATRIURETIC PEPTIDE    Collection Time: 11/01/18  2:48 PM   Result Value Ref Range    B Natriuretic Peptide 25 0 - 100 pg/mL   PROTHROMBIN TIME (INR)    Collection Time: 11/01/18  2:48 PM   Result Value Ref Range    PT 13.7 12.0 - 14.6 sec    INR 1.06 0.87 - 1.13   APTT    Collection Time: 11/01/18  2:48 PM   Result Value Ref Range    APTT 39.0 (H) 24.7 - 36.0 sec   ESTIMATED GFR    Collection Time: 11/01/18  2:48 PM   Result Value Ref Range    GFR If African American >60 >60 mL/min/1.73 m 2    GFR If Non African American >60 >60 mL/min/1.73 m 2   URINALYSIS (UA)    Collection Time: 11/01/18  3:30 PM   Result Value Ref Range    Color Yellow     Character Cloudy (A)     Specific Gravity 1.025 <1.035    Ph 7.0 5.0 - 8.0    Glucose Negative Negative mg/dL    Ketones >=80 (A) Negative mg/dL    Protein 30 (A) Negative mg/dL    Bilirubin Small (A) Negative    Nitrite Negative Negative    Leukocyte Esterase Negative Negative    Occult Blood Trace (A) Negative    Micro Urine Req Microscopic    URINE MICROSCOPIC (W/UA)    Collection Time: 11/01/18  3:30 PM   Result Value Ref Range    WBC 10-20 (A) /hpf    RBC Rare /hpf    Bacteria Many (A) None /hpf    Epithelial Cells Many (A) Few /hpf    Mucous Threads Many /hpf   FACTOR V LEIDEN MUTATION    Collection Time: 01/23/19 11:44 AM   Result Value Ref Range    V Leiden Factor Heterozygous (A)    FACTOR II DNA ANALYSIS    Collection Time: 01/23/19 11:44 AM   Result Value Ref Range    Factor Ii Dna Analysis Pt Gene Negative    ANTICARDIOLIPIN AB IGG,IGM,IGA    Collection Time: 01/23/19 11:44 AM   Result Value Ref  Range    Anti-Cariolipin Ab Igg 0 0 - 14 GPL    Anti-Cardiolipin Ab Igm 0 0 - 12 MPL    Anti-Cardiolipin Ab Iga 0 0 - 11 APL   SUKHWINDER REFLEXIVE PROFILE    Collection Time: 01/23/19 11:44 AM   Result Value Ref Range    Antinuclear Antibody None Detected None Detected   HEPARIN ANTI-XA    Collection Time: 02/07/19  3:38 PM   Result Value Ref Range    Heparin Anti-Xa 1.0 U/mL   GLUCOSE 1HR GESTATIONAL    Collection Time: 02/07/19  4:45 PM   Result Value Ref Range    Glucose, Post Dose 154 (H) 70 - 139 mg/dL   T.PALLIDUM AB EIA    Collection Time: 02/07/19  4:45 PM   Result Value Ref Range    Syphilis, Treponemal Qual Non Reactive Non Reactive   CBC WITHOUT DIFFERENTIAL    Collection Time: 02/07/19  4:45 PM   Result Value Ref Range    WBC 11.5 (H) 4.8 - 10.8 K/uL    RBC 4.15 (L) 4.20 - 5.40 M/uL    Hemoglobin 13.2 12.0 - 16.0 g/dL    Hematocrit 39.5 37.0 - 47.0 %    MCV 95.2 81.4 - 97.8 fL    MCH 31.8 27.0 - 33.0 pg    MCHC 33.4 (L) 33.6 - 35.0 g/dL    RDW 45.7 35.9 - 50.0 fL    Platelet Count 242 164 - 446 K/uL    MPV 12.1 9.0 - 12.9 fL   GLUCOSE 3 HR GESTATIONAL    Collection Time: 02/27/19 11:45 AM   Result Value Ref Range    Baseline Glucose 76 65 - 95 mg/dL    Glucose 1 Hour 169 65 - 180 mg/dL    Glucose 2 Hour 145 65 - 155 mg/dL    Glucose 3 Hour 97 65 - 140 mg/dL   FETAL FIBRONECTIN    Collection Time: 03/27/19  2:00 PM   Result Value Ref Range    Fetal Fibronectin Negative    GRP B STREP, BY PCR (SINGH BROTH)    Collection Time: 04/18/19 12:00 AM   Result Value Ref Range    Strep Gp B DNA PCR Positive    CBC WITH DIFFERENTIAL    Collection Time: 05/14/19  6:40 AM   Result Value Ref Range    WBC 12.0 (H) 4.8 - 10.8 K/uL    RBC 4.02 (L) 4.20 - 5.40 M/uL    Hemoglobin 12.9 12.0 - 16.0 g/dL    Hematocrit 37.8 37.0 - 47.0 %    MCV 94.0 81.4 - 97.8 fL    MCH 32.1 27.0 - 33.0 pg    MCHC 34.1 33.6 - 35.0 g/dL    RDW 46.4 35.9 - 50.0 fL    Platelet Count 187 164 - 446 K/uL    MPV 11.5 9.0 - 12.9 fL    Neutrophils-Polys 65.30  44.00 - 72.00 %    Lymphocytes 23.50 22.00 - 41.00 %    Monocytes 8.90 0.00 - 13.40 %    Eosinophils 1.10 0.00 - 6.90 %    Basophils 0.20 0.00 - 1.80 %    Immature Granulocytes 1.00 (H) 0.00 - 0.90 %    Nucleated RBC 0.00 /100 WBC    Neutrophils (Absolute) 7.86 (H) 2.00 - 7.15 K/uL    Lymphs (Absolute) 2.82 1.00 - 4.80 K/uL    Monos (Absolute) 1.07 (H) 0.00 - 0.85 K/uL    Eos (Absolute) 0.13 0.00 - 0.51 K/uL    Baso (Absolute) 0.02 0.00 - 0.12 K/uL    Immature Granulocytes (abs) 0.12 (H) 0.00 - 0.11 K/uL    NRBC (Absolute) 0.00 K/uL   Comp Metabolic Panel    Collection Time: 05/14/19  6:40 AM   Result Value Ref Range    Sodium 137 135 - 145 mmol/L    Potassium 3.5 (L) 3.6 - 5.5 mmol/L    Chloride 108 96 - 112 mmol/L    Co2 18 (L) 20 - 33 mmol/L    Anion Gap 11.0 0.0 - 11.9    Glucose 105 (H) 65 - 99 mg/dL    Bun 6 (L) 8 - 22 mg/dL    Creatinine 0.41 (L) 0.50 - 1.40 mg/dL    Calcium 9.3 8.5 - 10.5 mg/dL    AST(SGOT) 11 (L) 12 - 45 U/L    ALT(SGPT) 7 2 - 50 U/L    Alkaline Phosphatase 115 (H) 30 - 99 U/L    Total Bilirubin 0.3 0.1 - 1.5 mg/dL    Albumin 3.3 3.2 - 4.9 g/dL    Total Protein 5.8 (L) 6.0 - 8.2 g/dL    Globulin 2.5 1.9 - 3.5 g/dL    A-G Ratio 1.3 g/dL   HOLD BLOOD BANK SPECIMEN (NOT TESTED)    Collection Time: 05/14/19  6:40 AM   Result Value Ref Range    Holding Tube - Bb DONE    ESTIMATED GFR    Collection Time: 05/14/19  6:40 AM   Result Value Ref Range    GFR If African American >60 >60 mL/min/1.73 m 2    GFR If Non African American >60 >60 mL/min/1.73 m 2        Assessment:   Kateebony Paul Backner at 39w0d  Labor status: early labor   Obstetrical history significant for   Patient Active Problem List    Diagnosis Date Noted   • Encounter for post surgical wound check 08/27/2018   • Encounter for Nexplanon removal 08/14/2018   • Counseling for birth control, oral contraceptives 08/14/2018   • Pulmonary embolism ([facility]) 07/09/2018   • Hypercoagulable state ([facility]) 07/09/2018   • Tobacco use disorder  2018   • Pharyngitis 2018   • Bilious vomiting with nausea 2018   • Dysuria 2018   • Obesity (BMI 30-39.9) 2018   • Acute vaginitis 2018   • Bipolar 1 disorder (HCC) 2018   • ERICA (generalized anxiety disorder) 2018   • PUD (peptic ulcer disease) 2018   .      Plan:     Admit to L&D  GBS Positive   PCN for GBS  AROM - clear  Epidural for pain management if desired  Anticipate

## 2019-05-14 NOTE — PROGRESS NOTES
1240 Report received, poc discussed, care assumed.   1245 Dr. Todd Colunga in Department.  1250 Dr. Todd Colunga at bedside, SVE, AROM, IUPC placed.  Education provided to patient and fob; both verbalize understanding.

## 2019-05-14 NOTE — PROGRESS NOTES
Pt has factor five Leiden, hx of DVT in 2015 and PE in June of 2018.    0700 Received report from NOC RN, assumed care, POC discussed, all questions answered. Dr. Todd Colunga phoned and orders received. admission profile completed, IV started, labs drawn and sent, VSS. Pt encouraged to call RN for any needs, wants, desires or concerns. Pt resting comfortably in bed, denies needs at this time, VSS.  1155 Dr. Todd Colunga phoned RN, status update given, will be by in lunch hour to AROM if indicated. Will continue with POC.  1240 report given to LISA Nassar Rn, assumed care, POC discussed, see RN;s note for further details.  1310 reassumed care of patient.   1355 bolus for epidural initiated, anesthesia aware.   1418 Dr Chavez at bedside to place epidural, time out completed, pt consented, all questions answered, pt verbalized understanding, consents signed.   1441 epidural placed, negative test dose appreciated, pt tolerated well.  1530  grant placed, pt tolerated well.  SVE as noted.Pt resting in bed, feeling contraction pain just on her right side, pt repositioned and bolus button pressed, will re-asess pain, pt denies needs, encouraged to call RN for any  Needs, wants, desires or concerns.   1600 RN at bedside, pt much more comfortable, encouraged to call RN as needed. SCD's applied as pt has hx of DVT in 2015 and PE June of 2018.  1650 Dr. Todd Colunga phoned RN, will recheck SVE and update MD.  1653 SVE as noted.  1657 Dr. Todd Colunga phoned and updated on pt status, will continue with POC.  1800 RN at bedside, pt states she feels pressure, SVE as noted,  1805 Dr. Todd Colunga phoned, status update given, in trasit to attend delivery  1836 Dr. Todd Colunga at bedside, pt complete, MD and Rn to start pushing with pt.  1855 bedside report given to NOC RN, assumed care, POC discussed, all questions answered. VSS pt pushing with MD at bedside.

## 2019-05-14 NOTE — ANESTHESIA PREPROCEDURE EVALUATION
Relevant Problems   (+) PUD (peptic ulcer disease)       Physical Exam    Airway   Mallampati: II  TM distance: >3 FB  Neck ROM: full       Cardiovascular - normal exam  Rhythm: regular  Rate: normal  (-) murmur     Dental - normal exam         Pulmonary - normal exam  Breath sounds clear to auscultation     Abdominal    Neurological - normal exam                 Anesthesia Plan    ASA 2       Plan - epidural   Neuraxial block will be labor analgesia              Pertinent diagnostic labs and testing reviewed    Informed Consent:    Anesthetic plan and risks discussed with patient.

## 2019-05-15 LAB
ERYTHROCYTE [DISTWIDTH] IN BLOOD BY AUTOMATED COUNT: 46.3 FL (ref 35.9–50)
HCT VFR BLD AUTO: 38 % (ref 37–47)
HGB BLD-MCNC: 12.7 G/DL (ref 12–16)
MCH RBC QN AUTO: 31.5 PG (ref 27–33)
MCHC RBC AUTO-ENTMCNC: 33.4 G/DL (ref 33.6–35)
MCV RBC AUTO: 94.3 FL (ref 81.4–97.8)
PLATELET # BLD AUTO: 192 K/UL (ref 164–446)
PMV BLD AUTO: 11.3 FL (ref 9–12.9)
RBC # BLD AUTO: 4.03 M/UL (ref 4.2–5.4)
WBC # BLD AUTO: 18.3 K/UL (ref 4.8–10.8)

## 2019-05-15 PROCEDURE — 700102 HCHG RX REV CODE 250 W/ 637 OVERRIDE(OP): Performed by: OBSTETRICS & GYNECOLOGY

## 2019-05-15 PROCEDURE — 770002 HCHG ROOM/CARE - OB PRIVATE (112)

## 2019-05-15 PROCEDURE — 36415 COLL VENOUS BLD VENIPUNCTURE: CPT

## 2019-05-15 PROCEDURE — 700111 HCHG RX REV CODE 636 W/ 250 OVERRIDE (IP): Performed by: OBSTETRICS & GYNECOLOGY

## 2019-05-15 PROCEDURE — A9270 NON-COVERED ITEM OR SERVICE: HCPCS | Performed by: OBSTETRICS & GYNECOLOGY

## 2019-05-15 PROCEDURE — 700112 HCHG RX REV CODE 229: Performed by: OBSTETRICS & GYNECOLOGY

## 2019-05-15 PROCEDURE — 85027 COMPLETE CBC AUTOMATED: CPT

## 2019-05-15 RX ORDER — OXYCODONE AND ACETAMINOPHEN 10; 325 MG/1; MG/1
1 TABLET ORAL EVERY 4 HOURS PRN
Status: DISCONTINUED | OUTPATIENT
Start: 2019-05-15 | End: 2019-05-16 | Stop reason: HOSPADM

## 2019-05-15 RX ADMIN — OXYCODONE HYDROCHLORIDE AND ACETAMINOPHEN 2 TABLET: 5; 325 TABLET ORAL at 02:13

## 2019-05-15 RX ADMIN — DOCUSATE SODIUM 100 MG: 100 CAPSULE, LIQUID FILLED ORAL at 08:54

## 2019-05-15 RX ADMIN — OXYCODONE HYDROCHLORIDE AND ACETAMINOPHEN 1 TABLET: 10; 325 TABLET ORAL at 23:41

## 2019-05-15 RX ADMIN — Medication 1 TABLET: at 06:10

## 2019-05-15 RX ADMIN — HEPARIN SODIUM 10000 UNITS: 5000 INJECTION, SOLUTION INTRAVENOUS; SUBCUTANEOUS at 18:04

## 2019-05-15 RX ADMIN — HEPARIN SODIUM 10000 UNITS: 5000 INJECTION, SOLUTION INTRAVENOUS; SUBCUTANEOUS at 02:08

## 2019-05-15 RX ADMIN — ZOLPIDEM TARTRATE 10 MG: 5 TABLET ORAL at 23:38

## 2019-05-15 RX ADMIN — DOCUSATE SODIUM 100 MG: 100 CAPSULE, LIQUID FILLED ORAL at 23:38

## 2019-05-15 RX ADMIN — OXYCODONE HYDROCHLORIDE AND ACETAMINOPHEN 1 TABLET: 5; 325 TABLET ORAL at 17:19

## 2019-05-15 RX ADMIN — OXYCODONE HYDROCHLORIDE AND ACETAMINOPHEN 2 TABLET: 5; 325 TABLET ORAL at 08:54

## 2019-05-15 ASSESSMENT — EDINBURGH POSTNATAL DEPRESSION SCALE (EPDS)
I HAVE BEEN SO UNHAPPY THAT I HAVE BEEN CRYING: ONLY OCCASIONALLY
I HAVE BEEN ABLE TO LAUGH AND SEE THE FUNNY SIDE OF THINGS: AS MUCH AS I ALWAYS COULD
THE THOUGHT OF HARMING MYSELF HAS OCCURRED TO ME: NEVER
I HAVE LOOKED FORWARD WITH ENJOYMENT TO THINGS: AS MUCH AS I EVER DID
I HAVE BEEN ANXIOUS OR WORRIED FOR NO GOOD REASON: HARDLY EVER
I HAVE BLAMED MYSELF UNNECESSARILY WHEN THINGS WENT WRONG: NOT VERY OFTEN
I HAVE FELT SAD OR MISERABLE: NOT VERY OFTEN
THINGS HAVE BEEN GETTING ON TOP OF ME: NO, I HAVE BEEN COPING AS WELL AS EVER
I HAVE FELT SCARED OR PANICKY FOR NO GOOD REASON: NO, NOT MUCH
I HAVE BEEN SO UNHAPPY THAT I HAVE HAD DIFFICULTY SLEEPING: NOT AT ALL

## 2019-05-15 NOTE — ANESTHESIA QCDR
2019 [facility] Clinical Data Registry (for Quality Improvement)     Postoperative nausea/vomiting risk protocol (Adult = 18 yrs and Pediatric 3-17 yrs)- (430 and 463)  General inhalation anesthetic (NOT TIVA) with PONV risk factors: No  Provision of anti-emetic therapy with at least 2 different classes of agents: N/A  Patient DID NOT receive anti-emetic therapy and reason is documented in Medical Record: N/A    Multimodal Pain Management- (AQI59)  Patient undergoing Elective Surgery (i.e. Outpatient, or ASC, or Prescheduled Surgery prior to Hospital Admission): No  Use of Multimodal Pain Management, two or more drugs and/or interventions, NOT including systemic opioids: N/A  Exception: Documented allergy to multiple classes of analgesics: N/A    PACU assessment of acute postoperative pain prior to Anesthesia Care End- Applies to Patients Age = 18- (ABG7)  Initial PACU pain score is which of the following: < 7/10  Patient unable to report pain score: N/A    Post-anesthetic transfer of care checklist/protocol to PACU/ICU- (426 and 427)  Upon conclusion of case, patient transferred to which of the following locations: PACU/Non-ICU  Use of transfer checklist/protocol: Yes  Exclusion: Service Performed in Patient Hospital Room (and thus did not require transfer): N/A    PACU Reintubation- (AQI31)  General anesthesia requiring endotracheal intubation (ETT) along with subsequent extubation in OR or PACU: No  Required reintubation in the PACU: N/A  Extubation was a planned trial documented in the medical record prior to removal of the original airway device: N/A    Unplanned admission to ICU related to anesthesia service up through end of PACU care- (MD51)  Unplanned admission to ICU (not initially anticipated at anesthesia start time): No

## 2019-05-15 NOTE — L&D DELIVERY NOTE
DELIVERY NOTE: 2019    Admitting Diagnosis:  33 year old  39 weeks elective IOL; history of DVT and PE, Factor V Leiden, on anticoagulation this pregnancy.   GBS positive. PCN IV given x doses   Pitocin induction/augmentation.   She progressed uneventfully in labor and delivered via  at 1934 over intact perineum under epidural   to a LBM  in OA  presentation with BW pending  AS 8/9.  Good suctioning of the nose and mouth was done, cord clamped and cut by FOB and baby  Placed skin-to-skin on mother's abdomen   Clear AF.  Placenta was delivered spontaneously at 1937 complete and intact with 3 vessel cord.   Estimated Blood loss- 250 cc  Uterus noted to be firm and contracted immediately postpartum.  No other cervical nor vaginal lacerations noted.  Patient tolerated the procedure well. No complications encountered.  Both patient and baby are in good condition.   Heparin 76227 units SQ BID will be resumed 6 hours post delivery.

## 2019-05-15 NOTE — PROGRESS NOTES
A bedside report received from Rachel CHAVIRA @ 0700.  Assumed care. Discussed plan of care especially on managing pain.     @ 0830: Assessment done. Encouraged to call if with needs. Will check at intervals.

## 2019-05-15 NOTE — PROGRESS NOTES
1900_ Assumed pt care. Report from JENNIFER Daniel RN. Dr Todd Colunga @ bedside pushing with pt.    _ Dr Todd Colunga stepped out of the room.   _ Dr Todd Colunga back @ bedside for delivery.  _  viable male apgars 8/9  _ Placenta delivered intact  8_ Pt up to the bathroom and chuyita care done. Pt unable to void. Pt transferred to  with baby in stable condition. Report to CAIT Ramos.

## 2019-05-15 NOTE — ANESTHESIA POSTPROCEDURE EVALUATION
Patient: Kate Paul Backner    Procedure Summary     Date:  05/14/19 Room / Location:      Anesthesia Start:  1425 Anesthesia Stop:  1934    Procedure:  Labor Epidural Diagnosis:      Scheduled Providers:   Responsible Provider:  Paul Chavez M.D.    Anesthesia Type:  epidural ASA Status:  2          Final Anesthesia Type: epidural  Last vitals  BP   Blood Pressure: 138/84    Temp   36.4 °C (97.5 °F)    Pulse   Pulse: 78   Resp   17    SpO2   93 %      Anesthesia Post Evaluation    Patient location during evaluation: PACU  Patient participation: complete - patient participated  Level of consciousness: awake and alert  Pain score: 0    Airway patency: patent  Anesthetic complications: no  Cardiovascular status: hemodynamically stable  Respiratory status: acceptable  Hydration status: euvolemic    PONV: none           Nurse Pain Score: 0 (NPRS)

## 2019-05-15 NOTE — CARE PLAN
Problem: Alteration in comfort related to episiotomy, vaginal repair and/or after birth pains  Goal: Patient is able to ambulate, care for self and infant  Outcome: PROGRESSING AS EXPECTED  Will medicate for pain as needed.  Encouraged ambulation.     Problem: Potential knowledge deficit related to lack of understanding of self and  care  Goal: Patient will verbalize understanding of self and infant care  Outcome: PROGRESSING AS EXPECTED  Mom and FOB bonding well with infant.

## 2019-05-15 NOTE — CARE PLAN
Problem: Altered physiologic condition related to immediate post-delivery state and potential for bleeding/hemorrhage  Goal: Patient physiologically stable as evidenced by normal lochia, palpable uterine involution and vital signs within normal limits  Outcome: PROGRESSING AS EXPECTED  Patient stable and resting comfortably. Physical assessment WDL. (See flowsheet).Vital signs WDL for postpartum state.    Problem: Potential for postpartum infection related to presence of episiotomy/vaginal tear and/or uterine contamination  Goal: Patient will be absent from signs and symptoms of infection  Outcome: PROGRESSING AS EXPECTED  No signs or symptoms of infection.

## 2019-05-15 NOTE — PROGRESS NOTES
2230: Patient oriented to room and unit. Report received from Gabrielle Labor and Delivery RN. ID bands verified. Assessment complete. Patient requesting ambien for sleep. Dr. Todd godwin called and orders received, see MAR.

## 2019-05-15 NOTE — DISCHARGE PLANNING
Discharge Planning Assessment Post Partum    Reason for Referral: History of PTSD, anxiety, and bipolar.  Address: [address] [place] [address] [address] [place] [address]  Phone: [phone]  Type of Living Situation: living with FOB and children  Mom Diagnosis: Pregnancy  Baby Diagnosis: [place]  Primary Language: English    Name of Baby: Ton Shelton (: 19)  Father of the Baby: Sloan Shelton   Involved in baby’s care? Yes  Contact Information: [phone]    Prenatal Care: Yes  Mom's PCP: Dr. Hallie Norris  PCP for new baby: Pediatrician list provided    Support System: FOB  Coping/Bonding between mother & baby: Yes  Source of Feeding: breast and bottle feeding  Supplies for Infant: prepared for infant; denies any needs    Mom's Insurance: [employer]  Baby Covered on Insurance:Yes  Mother Employed/School:   Other children in the home/names & ages: MOB has two other children.    Financial Hardship/Income: denies   Mom's Mental status: alert and oriented  Services used prior to admit: none    CPS History: No  Psychiatric History: PTSD, anxiety, and bipolar.  Discussed with MOB who stated she has a Psychiatrist and a Therapist that she see's regularly and has already talked to them about post partum depression.  She will follow up with them in 6 weeks or sooner if needed.  Domestic Violence History: denies  Drug/ETOH History: denies    Resources Provided: children and family resource list and a pediatrician list  Referrals Made: diaper bank referral provided     Clearance for Discharge: Infant is cleared to discharge home with parents.

## 2019-05-15 NOTE — ANESTHESIA TIME REPORT
Anesthesia Start and Stop Event Times     Date Time Event    5/14/2019 1425 Anesthesia Start     1934 Anesthesia Stop        Responsible Staff  05/14/19    Name Role Begin End    Paul Chavez M.D. Anesth 1425 1934        Preop Diagnosis (Free Text):  Pre-op Diagnosis             Preop Diagnosis (Codes):    Post op Diagnosis  Pregnant      Premium Reason  A. 3PM - 7AM    Comments:

## 2019-05-15 NOTE — PROGRESS NOTES
Post Partum Progress Note    Name:   Kate Shelton   Date/Time:  5/15/2019 - 12:17 PM  Chief Admitting Dx:  Pregnancy  Labor and delivery indication for care or intervention  Delivery Type:  vaginal, spontaneous  Post-Op/Post Partum Days #:  1    Subjective:  Abdominal pain: no  Ambulating:   yes  Tolerating liquids:  yes  Tolerating food:  yes common adult  Flatus:   yes  BM:    no  Bleeding:   without any bleeding  Voiding:   yes  Dizziness:   no  Feeding:       Vitals:    05/14/19 2111 05/14/19 2200 05/15/19 0200 05/15/19 0600   BP: 127/75 138/84 130/80 112/64   Pulse: 80 78 87 76   Resp:  17 17 17   Temp:  36.4 °C (97.5 °F) 36.4 °C (97.6 °F) 36.3 °C (97.4 °F)   TempSrc:  Temporal Temporal Temporal   SpO2:  93% 93% 98%   Weight:       Height:           Exam:  Breast: Tenderness no  Abdomen: Abdomen soft, non-tender. BS normal. No masses,  No organomegaly  Fundal Tenderness:  no  Fundus Firm: yes  Below umbilicus: yes  Perineum: perineum intact  Lochia: mild  Extremities: Normal extremities, peripheral pulses and reflexes normal    Meds:  Current Facility-Administered Medications   Medication Dose   • oxyCODONE-acetaminophen (PERCOCET-10)  MG per tablet 1 Tab  1 Tab   • D5LR infusion     • metoclopramide (REGLAN) injection 10 mg  10 mg   • oxytocin (PITOCIN) infusion (for induction)  0.5-20 charis-units/min   • oxytocin (PITOCIN) infusion (for postpartum)  2,000 mL/hr    Followed by   • oxytocin (PITOCIN) infusion (for postpartum)   mL/hr   • acetaminophen (TYLENOL) tablet 325 mg  325 mg   • oxyCODONE-acetaminophen (PERCOCET) 5-325 MG per tablet 1 Tab  1 Tab   • LR infusion     • PRN oxytocin (PITOCIN) (20 Units/1000 mL) PRN for excessive uterine bleeding - See Admin Instr  125-999 mL/hr   • miSOPROStol (CYTOTEC) tablet 800 mcg  800 mcg   • docusate sodium (COLACE) capsule 100 mg  100 mg   • prenatal plus vitamin (STUARTNATAL 1+1) 27-1 MG tablet 1 Tab  1 Tab   • heparin injection 10,000 Units   10,000 Units   • zolpidem (AMBIEN) tablet 10 mg  10 mg       Labs:   Recent Labs      05/14/19   0640  05/15/19   0534   WBC  12.0*  18.3*   RBC  4.02*  4.03*   HEMOGLOBIN  12.9  12.7   HEMATOCRIT  37.8  38.0   MCV  94.0  94.3   MCH  32.1  31.5   MCHC  34.1  33.4*   RDW  46.4  46.3   PLATELETCT  187  192   MPV  11.5  11.3       Assessment:  Chief Admitting Dx:  Pregnancy  Labor and delivery indication for care or intervention  Delivery Type:  vaginal, spontaneous  Tubal Ligation:  no    Plan:  Continue routine post partum care.  Ambulate  Heparin 83325 u BID  Lovenox to be resumed at home  D/c home tomorrow PPD 2 because of GBS positive     Cristine Godfrey M.D.

## 2019-05-16 VITALS
DIASTOLIC BLOOD PRESSURE: 74 MMHG | HEART RATE: 78 BPM | WEIGHT: 171 LBS | BODY MASS INDEX: 31.47 KG/M2 | OXYGEN SATURATION: 98 % | TEMPERATURE: 97.3 F | SYSTOLIC BLOOD PRESSURE: 125 MMHG | RESPIRATION RATE: 18 BRPM | HEIGHT: 62 IN

## 2019-05-16 PROCEDURE — 700102 HCHG RX REV CODE 250 W/ 637 OVERRIDE(OP): Performed by: OBSTETRICS & GYNECOLOGY

## 2019-05-16 PROCEDURE — 700111 HCHG RX REV CODE 636 W/ 250 OVERRIDE (IP): Performed by: OBSTETRICS & GYNECOLOGY

## 2019-05-16 PROCEDURE — A9270 NON-COVERED ITEM OR SERVICE: HCPCS | Performed by: OBSTETRICS & GYNECOLOGY

## 2019-05-16 RX ADMIN — HEPARIN SODIUM 10000 UNITS: 5000 INJECTION, SOLUTION INTRAVENOUS; SUBCUTANEOUS at 06:07

## 2019-05-16 RX ADMIN — Medication 1 TABLET: at 06:07

## 2019-05-16 RX ADMIN — HEPARIN SODIUM 10000 UNITS: 5000 INJECTION, SOLUTION INTRAVENOUS; SUBCUTANEOUS at 18:12

## 2019-05-16 RX ADMIN — OXYCODONE HYDROCHLORIDE AND ACETAMINOPHEN 1 TABLET: 5; 325 TABLET ORAL at 06:17

## 2019-05-16 RX ADMIN — OXYCODONE HYDROCHLORIDE AND ACETAMINOPHEN 1 TABLET: 5; 325 TABLET ORAL at 15:41

## 2019-05-16 RX ADMIN — OXYCODONE HYDROCHLORIDE AND ACETAMINOPHEN 1 TABLET: 5; 325 TABLET ORAL at 10:35

## 2019-05-16 NOTE — DISCHARGE SUMMARY
Discharge Summary:      Kate Shelton      Admit Date:   2019  Discharge Date:  2019     Admitting diagnosis:  Pregnancy  Labor and delivery indication for care or intervention  Discharge Diagnosis: Status post vaginal, spontaneous.  Pregnancy Complications: H/O PE  Tubal Ligation:  no        History:  Past Medical History:   Diagnosis Date   • Anxiety    • Bipolar 1 disorder (HCC)    • Depression    • DVT of upper extremity (deep vein thrombosis) ([facility])     From IV   • DVT, lower extremity, recurrent ([facility]ly [facility])     hx left arm DVT at age 29   • GERD (gastroesophageal reflux disease)    • IBD (inflammatory bowel disease)    • Pregnant 2019    25weeks   • PTSD (post-traumatic stress disorder)    • PUD (peptic ulcer disease)      OB History    Para Term  AB Living   4 3 1     3   SAB TAB Ectopic Molar Multiple Live Births           0 1      # Outcome Date GA Lbr Martín/2nd Weight Sex Delivery Anes PTL Lv   4 Term 19 39w0d / 00:58 2.53 kg (5 lb 9.2 oz) M Vag-Spont EPI N JONNATHAN   3             2 Para            1 Para               Obstetric Comments   7 and 13        Nsaids; Food; and Ondansetron  Patient Active Problem List    Diagnosis Date Noted   • Encounter for post surgical wound check 2018   • Encounter for Nexplanon removal 2018   • Counseling for birth control, oral contraceptives 2018   • Pulmonary embolism ([facility]) 2018   • Hypercoagulable state ([facility]) 2018   • Tobacco use disorder 2018   • Pharyngitis 2018   • Bilious vomiting with nausea 2018   • Dysuria 2018   • Obesity (BMI 30-39.9) 2018   • Acute vaginitis 2018   • Bipolar 1 disorder (HCC) 2018   • ERICA (generalized anxiety disorder) 2018   • PUD (peptic ulcer disease) 2018        Hospital Course:   33 y.o. , now para 3, was admitted with the above mentioned diagnosis, underwent Induction of Labor, vaginal, spontaneous. Patient  postpartum course was unremarkable, with progressive advancement in diet , ambulation and toleration of oral analgesia. Patient without complaints today and desires discharge.      Vitals:    05/15/19 0200 05/15/19 0600 05/15/19 1800 05/16/19 0600   BP: 130/80 112/64 134/66 118/76   Pulse: 87 76 82 87   Resp: 17 17 18 17   Temp: 36.4 °C (97.6 °F) 36.3 °C (97.4 °F) 36.2 °C (97.2 °F) 36.8 °C (98.3 °F)   TempSrc: Temporal Temporal Temporal Temporal   SpO2: 93% 98% 99% 95%   Weight:       Height:           Current Facility-Administered Medications   Medication Dose   • oxyCODONE-acetaminophen (PERCOCET-10)  MG per tablet 1 Tab  1 Tab   • D5LR infusion     • metoclopramide (REGLAN) injection 10 mg  10 mg   • oxytocin (PITOCIN) infusion (for induction)  0.5-20 charis-units/min   • oxytocin (PITOCIN) infusion (for postpartum)   mL/hr   • acetaminophen (TYLENOL) tablet 325 mg  325 mg   • oxyCODONE-acetaminophen (PERCOCET) 5-325 MG per tablet 1 Tab  1 Tab   • LR infusion     • PRN oxytocin (PITOCIN) (20 Units/1000 mL) PRN for excessive uterine bleeding - See Admin Instr  125-999 mL/hr   • miSOPROStol (CYTOTEC) tablet 800 mcg  800 mcg   • docusate sodium (COLACE) capsule 100 mg  100 mg   • prenatal plus vitamin (STUARTNATAL 1+1) 27-1 MG tablet 1 Tab  1 Tab   • heparin injection 10,000 Units  10,000 Units   • zolpidem (AMBIEN) tablet 10 mg  10 mg       Exam:  Breast Exam: negative  Abdomen: Abdomen soft, non-tender. BS normal. No masses,  No organomegaly  Fundus Non Tender: yes  Incision: none  Perineum: perineum intact  Extremity: extremities, peripheral pulses and reflexes normal     Labs:  Recent Labs      05/14/19   0640  05/15/19   0534   WBC  12.0*  18.3*   RBC  4.02*  4.03*   HEMOGLOBIN  12.9  12.7   HEMATOCRIT  37.8  38.0   MCV  94.0  94.3   MCH  32.1  31.5   MCHC  34.1  33.4*   RDW  46.4  46.3   PLATELETCT  187  192   MPV  11.5  11.3        Activity:   Discharge to home  Pelvic Rest x 6  weeks    Assessment:  normal postpartum course  Discharge Assessment: No heavy bleeding or foul vaginal discharge      Follow up: Re Colunga in 6 weeks     Discharge Meds:   No current outpatient prescriptions on file.       Reema Agrawal M.D.

## 2019-05-16 NOTE — CARE PLAN
Problem: Altered physiologic condition related to immediate post-delivery state and potential for bleeding/hemorrhage  Goal: Patient physiologically stable as evidenced by normal lochia, palpable uterine involution and vital signs within normal limits  Outcome: PROGRESSING AS EXPECTED  Fundus firm, lochia light     Problem: Potential for postpartum infection related to presence of episiotomy/vaginal tear and/or uterine contamination  Goal: Patient will be absent from signs and symptoms of infection  Outcome: PROGRESSING AS EXPECTED  VSS. No signs or symptoms of infection noted

## 2019-05-16 NOTE — PROGRESS NOTES
4833 Assessment completed. Fundus firm, lochia light. Plan of care reviewed, verbalized understanding.   0000 Patient verbalized discharge instructions. No questions at this time.

## 2019-05-16 NOTE — CARE PLAN
Problem: Altered physiologic condition related to immediate post-delivery state and potential for bleeding/hemorrhage  Goal: Patient physiologically stable as evidenced by normal lochia, palpable uterine involution and vital signs within normal limits  Outcome: PROGRESSING AS EXPECTED  Patient is physiologically stable as evidenced by scant lochia rubra, firm fundus one fingerbreadth below the umbilicus, and vital signs WDL. Will continue to monitor patient condition.     Problem: Alteration in comfort related to episiotomy, vaginal repair and/or after birth pains  Goal: Patient is able to ambulate, care for self and infant  Outcome: PROGRESSING AS EXPECTED  Discussed pain management and verbalization of pain utilizing the 0-10 pain scale. White board updated with times of pain medication availability and pt requests pain medication be provided as available. Discussed non-pharmacological pain control therapies and pt provided perineal ice packs, Tucks, and lidocaine spray per request. Pt ambulates with a steady gait and demonstrates the ability to participate in ADLs and provide care for  son.

## 2019-05-16 NOTE — DISCHARGE INSTRUCTIONS
POSTPARTUM DISCHARGE INSTRUCTIONS FOR MOM    YOB: 1985   Age: 33 y.o.               Admit Date: 2019     Discharge Date: 2019  Attending Doctor:  Cristine Li*                  Allergies:  Nsaids; Food; and Ondansetron    Discharged to home by car. Discharged via wheelchair, hospital escort: Yes.  Special equipment needed: Not Applicable  Belongings with: Personal  Be sure to schedule a follow-up appointment with your primary care doctor or any specialists as instructed.     Discharge Plan:   Diet Plan: Discussed  Activity Level: Discussed  Confirmed Follow up Appointment: Patient to Call and Schedule Appointment  Confirmed Symptoms Management: Discussed  Medication Reconciliation Updated: Yes  Influenza Vaccine Indication: Not indicated: Previously immunized this influenza season and > 8 years of age    REASONS TO CALL YOUR OBSTETRICIAN:  1.   Persistent fever or shaking chills (Temperature higher than 100.4)  2.   Heavy bleeding (soaking more than 1 pad per hour); Passing clots  3.   Foul odor from vagina  4.   Mastitis (Breast infection; breast pain, chills, fever, redness)  5.   Urinary pain, burning or frequency  6.   Episiotomy infection  7.   Abdominal incision infection  8.   Severe depression longer than 24 hours    HAND WASHING  · Prior to handling the baby.  · Before breastfeeding or bottle feeding baby.  · After using the bathroom or changing the baby's diaper.    WOUND CARE  Ask your physician for additional care instructions.  In general:    ·  Incision:      · Keep clean and dry.    · Do NOT lift anything heavier than your baby for up to 6 weeks.    · There should not be any opening or pus.      VAGINAL CARE  · Nothing inside vagina for 6 weeks: no sexual intercourse, tampons or douching.  · Bleeding may continue for 2-4 weeks.  Amount may vary.    · Call your physician for heavy bleeding which means soaking more than 1 pad per hour    BIRTH CONTROL  · It is  "possible to become pregnant at any time after delivery and while breastfeeding.  · Plan to discuss a method of birth control with your physician at your follow up visit. visit.    DIET AND ELIMINATION  · Eating more fiber (bran cereal, fruits, and vegetables) and drinking plenty of fluids will help to avoid constipation.  · Urinary frequency after childbirth is normal.    POSTPARTUM BLUES  During the first few days after birth, you may experience a sense of the \"blues\" which may include impatience, irritability or even crying.  These feeling come and go quickly.  However, as many as 1 in 10 women experience emotional symptoms known as postpartum depression.    Postpartum depression:  May start as early as the second or third day after delivery or take several weeks or months to develop.  Symptoms of \"blues\" are present, but are more intense:  Crying spells; loss of appetite; feelings of hopelessness or loss of control; fear of touching the baby; over concern or no concern at all about the baby; little or no concern about your own appearance/caring for yourself; and/or inability to sleep or excessive sleeping.  Contact your physician if you are experiencing any of these symptoms.    Crisis Hotline:  · [place] Crisis Hotline:  7-170-IXBZAEN  Or 1-622.897.7802  · [place] Crisis Hotline:  1-562.651.7685  Or 815-723-7591    PREVENTING SHAKEN BABY:  If you are angry or stressed, PUT THE BABY IN THE CRIB, step away, take some deep breaths, and wait until you are calm to care for the baby.  DO NOT SHAKE THE BABY.  You are not alone, call a supporter for help.    · Crisis Call Center 24/7 crisis line 466-973-8018 or 1-593.350.7178  · You can also text them, text \"ANSWER\" to 754587    QUIT SMOKING/TOBACCO USE:  I understand the use of any tobacco products increases my chance of suffering from future heart disease and could cause other illnesses which may shorten my life. Quitting the use of tobacco products is the single most " important thing I can do to improve my health. For further information on smoking / tobacco cessation call a Toll Free Quit Line at 1-807.729.4543 (*National [employer] [place]) or 1-368.310.3014 (American Lung Association) or you can access the web based program at www.lungusa.org.    · [place] Tobacco Users Help Line:  (987) 100-8336       Toll Free: 1-917.304.2080  · Quit Tobacco Program [facility] (679)765-3322    DEPRESSION / SUICIDE RISK:  As you are discharged from this [facility], it is important to learn how to keep safe from harming yourself.    Recognize the warning signs:  · Abrupt changes in personality, positive or negative- including increase in energy   · Giving away possessions  · Change in eating patterns- significant weight changes-  positive or negative  · Change in sleeping patterns- unable to sleep or sleeping all the time   · Unwillingness or inability to communicate  · Depression  · Unusual sadness, discouragement and loneliness  · Talk of wanting to die  · Neglect of personal appearance   · Rebelliousness- reckless behavior  · Withdrawal from people/activities they love  · Confusion- inability to concentrate     If you or a loved one observes any of these behaviors or has concerns about self-harm, here's what you can do:  · Talk about it- your feelings and reasons for harming yourself  · Remove any means that you might use to hurt yourself (examples: pills, rope, extension cords, firearm)  · Get professional help from the community (Mental Health, Substance Abuse, psychological counseling)  · Do not be alone:Call your Safe Contact- someone whom you trust who will be there for you.  · Call your local CRISIS HOTLINE 290-4010 or 558-362-7105  · Call your local Children's Mobile Crisis Response Team [place] (492) 026-1600 or www.Azuro  · Call the toll free National Suicide Prevention Hotlines   · National Suicide Prevention Lifeline 754-181-KGGT  (0336)  · [facility] 800-SUICIDE (737-9382)    DISCHARGE SURVEY:  Thank you for choosing [facility].  We hope we provided you with very good care.  You may be receiving a survey in the mail.  Please fill it out.  Your opinion is valuable to us.    ADDITIONAL EDUCATIONAL MATERIALS GIVEN TO PATIENT:        My signature on this form indicates that:  1.  I have reviewed and understand the above information  2.  My questions regarding this information have been answered to my satisfaction.  3.  I have formulated a plan with my discharge nurse to obtain my prescribed medication for home.

## 2019-05-16 NOTE — PROGRESS NOTES
Patient assessment completed. Discussed pain management plan and patient requests pain medication as available. Patient denies dizziness and headaches; states she is voiding w/o difficulty. POC discussed, all questions answered, and rounding in place.

## 2019-05-16 NOTE — CONSULTS
"Met with MOB for an initial lactation visit.  MOB delivered her third baby on 05/14/19 at 1934 at 39 weeks gestation.  Infant is approximately 39.5 hours old.  MOB reported breast fed her first two children for approximately 2 weeks each and stated stopped due to pain and damage nipples with breastfeeding.  MOB stated her goal is to breast and formula feed for 6 weeks until she resumes her anti-psychotic medications at which time she stated she will then feed infant formula only.  Infant's weight loss since birth within defined limits at 5.54% and infant is voiding and stooling appropriately.    Lactation assistance offered, but MOB declined.  MOB stated infant is latching onto the breast without difficulty and is feeding well.  MOB denied pain and tissue damage to nipples and areolas with latch.    Discussed what to expect with breastfeeding in the first 24-48-72 hours following delivery as well as signs of successful milk transfer.    Provided MOB with \"A New Beginnings\" booklet and breastfeeding content reviewed with MOB.    Breastfeeding Plan:  Offer infant the breast first at every feed and then offer infant formula, per 10-20-30 supplementation guidelines, afterwards, if infant still appears hungry or if infant has poor or no latch.    MOB informed that infant needs to eat at least a minimum of 8-12 times in a 24 hour period and should be fed on demand per feeding cues and within three hours from the last feed.  MOB advised not to let infant go more than 3 hours without a feed.    MOB informed of the outpatient lactation assistance available to her through the [facility] and was invited to attend [facility].    MOB stated she has a Medela personal breast pump for home use should she need one.    MOB verbalized understanding of all information provided to her including the above feeding plan and denied having any further questions at this time.  Encouraged MOB to call for lactation " assistance as needed prior to discharge.

## 2019-05-23 ENCOUNTER — ANTICOAGULATION VISIT (OUTPATIENT)
Dept: MEDICAL GROUP | Facility: MEDICAL CENTER | Age: 34
End: 2019-05-23
Payer: COMMERCIAL

## 2019-05-23 DIAGNOSIS — D68.59 HYPERCOAGULABLE STATE (HCC): ICD-10-CM

## 2019-05-23 LAB — INR PPP: 0.9 (ref 2–3.5)

## 2019-05-23 PROCEDURE — 99211 OFF/OP EST MAY X REQ PHY/QHP: CPT | Performed by: INTERNAL MEDICINE

## 2019-05-23 PROCEDURE — 85610 PROTHROMBIN TIME: CPT | Performed by: INTERNAL MEDICINE

## 2019-05-23 NOTE — PROGRESS NOTES
Anticoagulation Summary  As of 2019    INR goal:      TTR:   --   INR used for dosin.90 (2019)   Warfarin maintenance plan:   No maintenance plan   Next INR check:      Target end date:       Indications    Hypercoagulable state (HCC) [D68.59]  Pulmonary embolism (HCC) [I26.99]             Anticoagulation Episode Summary     INR check location:       Preferred lab:       Send INR reminders to:       Comments:         Anticoagulation Care Providers     Provider Role Specialty Phone number    [facility] Responsible  [phone]        Anticoagulation Patient Findings     History of Present Illness: follow up appointment for chronic anticoagulation with the high risk medication, lovenox  for PE/Factor V Leiden.  Pt gave birth to a beautiful baby boy 9 days ago.  Her last heparin dose was in hospital.  Pt has not self administered any lovenox since her discharge.  Pt has a medical history of breast implants, and lift, and has been unable to breast feed.    Pt was on Xarelto prior to this last pregnancy and wishes to resume this.    Pt has no stored breast milk.  Will resume xarelto starting today. Samples given. Follow up in 4 weeks, to reduce the risk of adverse events related to this high risk medication, Xarelto.    Pt will use condoms for contraception, until she schedules her tubal ligation.    Laura Talley, Clinical Pharmacist

## 2019-06-20 ENCOUNTER — ANTICOAGULATION VISIT (OUTPATIENT)
Dept: MEDICAL GROUP | Facility: MEDICAL CENTER | Age: 34
End: 2019-06-20
Payer: COMMERCIAL

## 2019-06-20 DIAGNOSIS — D68.59 HYPERCOAGULABLE STATE (HCC): Primary | ICD-10-CM

## 2019-06-20 PROCEDURE — 99211 OFF/OP EST MAY X REQ PHY/QHP: CPT | Performed by: FAMILY MEDICINE

## 2019-06-20 NOTE — PROGRESS NOTES
OP Anticoagulation Service Note       Target end date:indefinite     Indication: hx of PE and hypercoagulable state     Drug: Xarelto 20 mg daily        Health Status Since Last Assessment   Patient denies any new relevant medical problems, ED visits or hospitalizations   Patient denies any embolic events (stroke/tia/systemic embolism)    Adherence with DOAC Therapy   Pt  has 0 missed any doses in the average week   Pt understand importance of not missing doses of DOAC and increased risk of thrombosis with frequent missed doses     Bleeding Risk Assessment     Denies Epistaxis   Pt denies any excessive or unusual bleeding/hematomas.    Pt denies any GI bleeds or hematemesis.     Pt denies any concerning daily headache or sub dural hematoma symptoms.   Pt denies any hematuria or abnormal vaginal bleeding.   Latest Hemoglobin 12.7   ETOH overuse rarely     Creatinine Clearance/Renal Function     Latest ClCr >30     Lab Results   Component Value Date    SODIUM 137 05/14/2019    POTASSIUM 3.5 (L) 05/14/2019    CHLORIDE 108 05/14/2019    CO2 18 (L) 05/14/2019    ANION 11.0 05/14/2019    GLUCOSE 105 (H) 05/14/2019    BUN 6 (L) 05/14/2019    CREATININE 0.41 (L) 05/14/2019    CALCIUM 9.3 05/14/2019    ASTSGOT 11 (L) 05/14/2019    ALTSGPT 7 05/14/2019    ALKPHOSPHAT 115 (H) 05/14/2019    TBILIRUBIN 0.3 05/14/2019    ALBUMIN 3.3 05/14/2019    AGRATIO 1.3 05/14/2019        Drug Interactions   Platelets: none   ASA/other antiplatelets none   NSAID none   Other drug interactions none    Verified no anticonvulsant or azole therapy, education provided for future use.     Examination   Symptomatic hypotension none   Significant gait impairment/imbalance/high risk for falls? none   Any falls or accidents since last visit? none     Final Assessment and Recommendations:   Patient appears stable from the anticoagulation staindpoint.    Medication is affordable   Reviewed with pt major s/s of bleeding and to seek immediate medical  attention  for any bad falls, accidents or if they hit his head    Benefits of continued DOAC therapy outweigh risks for this patient   Recommend pt continue with current DOAC therapy    Reminded pt not to stop anticoagulation with out speaking with a medical   Other Actions: CBC, BMP    Follow up:   Will follow up with patient in 6 months.      Nanci Dempsey, PharmD

## 2019-06-23 ENCOUNTER — OFFICE VISIT (OUTPATIENT)
Dept: URGENT CARE | Facility: MEDICAL CENTER | Age: 34
End: 2019-06-23
Payer: COMMERCIAL

## 2019-06-23 VITALS
DIASTOLIC BLOOD PRESSURE: 72 MMHG | HEART RATE: 98 BPM | TEMPERATURE: 97.9 F | BODY MASS INDEX: 32.66 KG/M2 | RESPIRATION RATE: 14 BRPM | HEIGHT: 61 IN | SYSTOLIC BLOOD PRESSURE: 122 MMHG | WEIGHT: 173 LBS | OXYGEN SATURATION: 97 %

## 2019-06-23 DIAGNOSIS — H60.503 ACUTE OTITIS EXTERNA OF BOTH EARS, UNSPECIFIED TYPE: ICD-10-CM

## 2019-06-23 DIAGNOSIS — H69.91 EUSTACHIAN TUBE DYSFUNCTION, RIGHT: ICD-10-CM

## 2019-06-23 PROCEDURE — 99214 OFFICE O/P EST MOD 30 MIN: CPT | Performed by: PHYSICIAN ASSISTANT

## 2019-06-23 RX ORDER — FLUTICASONE PROPIONATE 50 MCG
1 SPRAY, SUSPENSION (ML) NASAL DAILY
Qty: 1 BOTTLE | Refills: 0 | Status: SHIPPED | OUTPATIENT
Start: 2019-06-23 | End: 2019-07-12

## 2019-06-23 RX ORDER — OFLOXACIN 3 MG/ML
5 SOLUTION AURICULAR (OTIC) DAILY
Qty: 10 ML | Refills: 0 | Status: SHIPPED | OUTPATIENT
Start: 2019-06-23 | End: 2019-06-30

## 2019-06-23 ASSESSMENT — ENCOUNTER SYMPTOMS
ABDOMINAL PAIN: 0
MYALGIAS: 0
EYE DISCHARGE: 0
CHILLS: 0
CONSTIPATION: 0
FEVER: 0
SINUS PAIN: 0
PALPITATIONS: 0
HEADACHES: 0
VOMITING: 0
SORE THROAT: 0
DIZZINESS: 0
COUGH: 0
NAUSEA: 0
EYE REDNESS: 0
SHORTNESS OF BREATH: 0
DIARRHEA: 0

## 2019-06-23 NOTE — PROGRESS NOTES
Subjective:      Kate Shelton is a 33 y.o. female who presents with Otalgia (leaking/ x1day)      Otalgia    There is pain in both (Much worse on the right side) ears. This is a new problem. The current episode started in the past 7 days. The problem occurs constantly. The problem has been gradually worsening. There has been no fever. The pain is moderate. Associated symptoms include ear discharge. Pertinent negatives include no abdominal pain, coughing, diarrhea, headaches, hearing loss, rash, sore throat or vomiting. She has tried NSAIDs for the symptoms. The treatment provided mild relief. There is no history of a chronic ear infection, hearing loss or a tympanostomy tube.       Review of Systems   Constitutional: Negative for chills, fever and malaise/fatigue.   HENT: Positive for ear discharge and ear pain. Negative for hearing loss, sinus pain, sore throat and tinnitus.    Eyes: Negative for discharge and redness.   Respiratory: Negative for cough and shortness of breath.    Cardiovascular: Negative for chest pain and palpitations.   Gastrointestinal: Negative for abdominal pain, constipation, diarrhea, nausea and vomiting.   Musculoskeletal: Negative for myalgias.   Skin: Negative for rash.   Neurological: Negative for dizziness and headaches.       PMH:  has a past medical history of Anxiety; Bipolar 1 disorder (HCC); Depression; DVT of upper extremity (deep vein thrombosis) ([facility]); DVT, lower extremity, recurrent ([facility]); GERD (gastroesophageal reflux disease); IBD (inflammatory bowel disease); Pregnant (01/11/2019); PTSD (post-traumatic stress disorder); and PUD (peptic ulcer disease).  MEDS:   Current Outpatient Prescriptions:   •  ofloxacin otic sol (FLOXIN OTIC) 0.3 % Solution, Place 5 Drops in ear every day for 7 days., Disp: 10 mL, Rfl: 0  •  fluticasone (FLONASE) 50 MCG/ACT nasal spray, Spray 1 Spray in nose every day., Disp: 1 Bottle, Rfl: 0  •  rivaroxaban (XARELTO) 20 MG Tab tablet, Take 1  "Tab by mouth with dinner., Disp: 90 Tab, Rfl: 1  •  PRENATAL VIT-DOCUSATE-IRON-FA PO, Take  by mouth every day., Disp: , Rfl:   •  ALPRAZolam (XANAX) 0.5 MG Tab, Take 0.5 mg by mouth at bedtime as needed for Sleep., Disp: , Rfl:   •  zolpidem (AMBIEN) 10 MG Tab, Take 10 mg by mouth at bedtime as needed., Disp: , Rfl: 0  ALLERGIES:   Allergies   Allergen Reactions   • Nsaids Unspecified     Hx ulcers   • Food Vomiting     seafood   • Ondansetron Unspecified     Constipation       SURGHX:   Past Surgical History:   Procedure Laterality Date   • VENTRAL HERNIA REPAIR N/A 2/12/2019    Procedure: VENTRAL HERNIA REPAIR-  WITH PRIMARY REPAIR;  Surgeon: Adria Bob M.D.;  Location: SURGERY SAME DAY [facility];  Service: General   • MAMMOPLASTY AUGMENTATION  2016     SOCHX:  reports that she has been smoking Cigarettes.  She has a 2.00 pack-year smoking history. She has never used smokeless tobacco. She reports that she does not drink alcohol or use drugs.  FH: Family history was reviewed, no pertinent findings to report     Objective:     /72 (BP Location: Right arm, Patient Position: Sitting, BP Cuff Size: Adult)   Pulse 98   Temp 36.6 °C (97.9 °F) (Temporal)   Resp 14   Ht 1.549 m (5' 1\")   Wt 78.5 kg (173 lb)   SpO2 97%   BMI 32.69 kg/m²      Physical Exam   Constitutional: She is oriented to person, place, and time. She appears well-developed and well-nourished.   HENT:   Head: Normocephalic and atraumatic.   Right Ear: External ear normal. No lacerations. There is tenderness (With manipulation of the tragus). No drainage or swelling. No foreign bodies. A middle ear effusion is present.   Left Ear: Tympanic membrane, external ear and ear canal normal.   Nose: Mucosal edema present. Right sinus exhibits no maxillary sinus tenderness and no frontal sinus tenderness. Left sinus exhibits no maxillary sinus tenderness and no frontal sinus tenderness.   Mouth/Throat: Uvula is midline, oropharynx is clear and " moist and mucous membranes are normal.   Eyes: Pupils are equal, round, and reactive to light. Conjunctivae are normal.   Neck: Normal range of motion.   Cardiovascular: Normal rate, regular rhythm and normal heart sounds.    No murmur heard.  Pulmonary/Chest: Effort normal and breath sounds normal. She has no wheezes.   Lymphadenopathy:     She has no cervical adenopathy.   Neurological: She is alert and oriented to person, place, and time.   Skin: Skin is warm and dry. Capillary refill takes less than 2 seconds.   Psychiatric: She has a normal mood and affect. Her behavior is normal. Judgment normal.          Assessment/Plan:     1. Acute otitis externa of both ears, unspecified type  - ofloxacin otic sol (FLOXIN OTIC) 0.3 % Solution; Place 5 Drops in ear every day for 7 days.  Dispense: 10 mL; Refill: 0    2. Eustachian tube dysfunction, right  - fluticasone (FLONASE) 50 MCG/ACT nasal spray; Spray 1 Spray in nose every day.  Dispense: 1 Bottle; Refill: 0  -OTC Zyrtec or Claritin      On physical exam patient's right ear canal does not appear to be swollen or erythematous but she does have pain with mid ablation of the tragus.  Therefore, we will do a trial of ofloxacin eardrops.  Additionally, she does have fluid behind her right eardrum so advised her to use OTC Flonase and OTC Zyrtec or Claritin for 7 to 10 days.  If she continues to have ear pain after a trial of these medications she should follow-up with her PCP for further evaluation.

## 2019-07-12 DIAGNOSIS — Z01.812 PRE-OPERATIVE LABORATORY EXAMINATION: ICD-10-CM

## 2019-07-12 LAB
ANION GAP SERPL CALC-SCNC: 11 MMOL/L (ref 0–11.9)
BUN SERPL-MCNC: 6 MG/DL (ref 8–22)
CALCIUM SERPL-MCNC: 8.8 MG/DL (ref 8.5–10.5)
CHLORIDE SERPL-SCNC: 108 MMOL/L (ref 96–112)
CO2 SERPL-SCNC: 21 MMOL/L (ref 20–33)
CREAT SERPL-MCNC: 0.73 MG/DL (ref 0.5–1.4)
ERYTHROCYTE [DISTWIDTH] IN BLOOD BY AUTOMATED COUNT: 47.7 FL (ref 35.9–50)
GLUCOSE SERPL-MCNC: 76 MG/DL (ref 65–99)
HCT VFR BLD AUTO: 43.9 % (ref 37–47)
HGB BLD-MCNC: 14.7 G/DL (ref 12–16)
MCH RBC QN AUTO: 32.5 PG (ref 27–33)
MCHC RBC AUTO-ENTMCNC: 33.5 G/DL (ref 33.6–35)
MCV RBC AUTO: 96.9 FL (ref 81.4–97.8)
PLATELET # BLD AUTO: 261 K/UL (ref 164–446)
PMV BLD AUTO: 11.1 FL (ref 9–12.9)
POTASSIUM SERPL-SCNC: 3.7 MMOL/L (ref 3.6–5.5)
RBC # BLD AUTO: 4.53 M/UL (ref 4.2–5.4)
SODIUM SERPL-SCNC: 140 MMOL/L (ref 135–145)
WBC # BLD AUTO: 7 K/UL (ref 4.8–10.8)

## 2019-07-12 PROCEDURE — 36415 COLL VENOUS BLD VENIPUNCTURE: CPT

## 2019-07-12 PROCEDURE — 85027 COMPLETE CBC AUTOMATED: CPT

## 2019-07-12 PROCEDURE — 80048 BASIC METABOLIC PNL TOTAL CA: CPT

## 2019-07-15 ENCOUNTER — OFFICE VISIT (OUTPATIENT)
Dept: URGENT CARE | Facility: MEDICAL CENTER | Age: 34
End: 2019-07-15
Payer: COMMERCIAL

## 2019-07-15 VITALS
TEMPERATURE: 100.4 F | HEART RATE: 100 BPM | OXYGEN SATURATION: 95 % | BODY MASS INDEX: 32.66 KG/M2 | SYSTOLIC BLOOD PRESSURE: 120 MMHG | WEIGHT: 173 LBS | DIASTOLIC BLOOD PRESSURE: 78 MMHG | HEIGHT: 61 IN

## 2019-07-15 DIAGNOSIS — N75.1 BARTHOLIN'S GLAND ABSCESS: ICD-10-CM

## 2019-07-15 DIAGNOSIS — N76.4 ABSCESS OF RIGHT GENITAL LABIA: ICD-10-CM

## 2019-07-15 PROCEDURE — 56420 I&D BARTHOLINS GLAND ABSCESS: CPT | Performed by: PHYSICIAN ASSISTANT

## 2019-07-15 RX ORDER — SULFAMETHOXAZOLE AND TRIMETHOPRIM 800; 160 MG/1; MG/1
1 TABLET ORAL 2 TIMES DAILY
Qty: 20 TAB | Refills: 0 | Status: SHIPPED | OUTPATIENT
Start: 2019-07-15 | End: 2019-07-23

## 2019-07-15 ASSESSMENT — ENCOUNTER SYMPTOMS
FEVER: 1
ABDOMINAL PAIN: 0
CHILLS: 0

## 2019-07-15 NOTE — H&P
HISTORY AND PHYSICAL     PRE-OP DIAGNOSIS:   1. MULTIPARITY    2. DESIRES PERMANENT STERILIZATION     SURGICAL PROCEDURE:   1. LAPAROSCOPIC BILATERAL SALPINGECTOMY    History of present illness:   33 y.o.  presents for above surgical procedure  Patient had a vaginal delivery about 8 weeks  She has thrombophilia on Xarelto  She is doing well     Review of systems:  Pertinent positives documented in HPI and all other systems reviewed & are negative    All PMH, PSH, allergies, social history and FH reviewed and updated today:  Past Medical History:   Diagnosis Date   • Anxiety    • Bipolar 1 disorder (HCC)    • Depression    • DVT of upper extremity (deep vein thrombosis) (HCC)     From IV   • DVT, lower extremity, recurrent (HCC)     hx left arm DVT at age 29   • GERD (gastroesophageal reflux disease)    • IBD (inflammatory bowel disease)    • PTSD (post-traumatic stress disorder)    • PUD (peptic ulcer disease)    • Pulmonary embolism ([facility]) 2019    PE       Past Surgical History:   Procedure Laterality Date   • VENTRAL HERNIA REPAIR N/A 2019    Procedure: VENTRAL HERNIA REPAIR-  WITH PRIMARY REPAIR;  Surgeon: Adria Bob M.D.;  Location: SURGERY SAME DAY [facility];  Service: General   • MAMMOPLASTY AUGMENTATION  2016       Allergies:   Allergies   Allergen Reactions   • Nsaids Unspecified     Hx ulcers   • Food Vomiting     seafood   • Ondansetron Unspecified     Constipation         Social History     Social History   • Marital status:      Spouse name: N/A   • Number of children: N/A   • Years of education: N/A     Occupational History   • Not on file.     Social History Main Topics   • Smoking status: Current Some Day Smoker     Packs/day: 0.25     Years: 8.00     Types: Cigarettes     Last attempt to quit: 2018   • Smokeless tobacco: Never Used   • Alcohol use No      Comment: 2/month   • Drug use: No   • Sexual activity: Yes     Partners: Male     Birth control/ protection:  "Implant     Other Topics Concern   • Not on file     Social History Narrative   • No narrative on file       Family History   Problem Relation Age of Onset   • Psychiatry Mother    • Other Mother         killed in [place]   • Heart Disease Father    • Stroke Father        Physical exam:  Ht 1.549 m (5' 1\")   Wt 80.8 kg (178 lb 2.1 oz)     General:appears stated age, is in no apparent distress, is well developed and well nourished  Head: normocephalic, non-tender  Neck: neck is supple  CV : regular rate and rhythm, no peripheral edema  Lungs: Normal respiratory effort. Clear to auscultation bilaterally  Abdomen: Bowel sounds positive, nondistended, soft, nontender x4, no rebound or guarding. No organomegaly. No masses.  Female GYN: deferred   Skin: No rashes, or ulcers or lesions seen  Psychiatric: Patient shows appropriate affect, is alert and oriented x3, intact judgment and insight.    ASSESSMENT AND PLAN:     1. MULTIPARITY    2. DESIRES PERMANENT STERILIZATION     LAPAROSCOPIC BILATERAL SALPINGECTOMY ANY OTHER INDICATED PROCEDURE    Discussed today with a risks of tubal ligation. I discussed that the tubal is considered a permanent procedure and the patient will no longer be able to bear children following a tubal. I discussed other forms of birth control which include pills, barrier methods, Depo-Provera, IUD or male sterilization. We discussed  medications or procedures are nonpermanent nor are they surgical. I also discussed the risk of tubal failure with the patient which is one in 200 procedures. We discussed that should the tubal fail there is a risk for ectopic pregnancy which may require surgical intervention.  Removal of the entire fallopian tube (bilateral salpingectomy) vs fulguration of a segment of the tube likewise discussed with her. She is made aware that bilateral salpingectomy as method of permanent sterilization shows that ovarian cancer may frequently originate from the fallopian tubes.  "    Risks and benefits of laparoscopy are discussed with patient today. Specific risk for a laparoscopy are infection, bleeding, scar to the exterior or interior of abdomen, damage to other structures including bowel, bladder or ureters. Failure of laparoscopy to diagnose and or correct patient's problem.    After discussion of risks and benefits, patient had opportunity to ask questions pertaining to indications/risks benefits of surgery. All questions have been answered and laparoscopic consent signed.    Patient had the opportunity to ask questions regarding procedures. All questions answered to the patient's satisfaction.

## 2019-07-16 ENCOUNTER — HOSPITAL ENCOUNTER (OUTPATIENT)
Facility: MEDICAL CENTER | Age: 34
End: 2019-07-16
Attending: OBSTETRICS & GYNECOLOGY | Admitting: OBSTETRICS & GYNECOLOGY
Payer: COMMERCIAL

## 2019-07-16 ENCOUNTER — ANESTHESIA (OUTPATIENT)
Dept: SURGERY | Facility: MEDICAL CENTER | Age: 34
End: 2019-07-16
Payer: COMMERCIAL

## 2019-07-16 ENCOUNTER — ANESTHESIA EVENT (OUTPATIENT)
Dept: SURGERY | Facility: MEDICAL CENTER | Age: 34
End: 2019-07-16
Payer: COMMERCIAL

## 2019-07-16 VITALS
HEART RATE: 78 BPM | TEMPERATURE: 97.3 F | WEIGHT: 176.81 LBS | BODY MASS INDEX: 33.38 KG/M2 | OXYGEN SATURATION: 97 % | RESPIRATION RATE: 16 BRPM | HEIGHT: 61 IN

## 2019-07-16 LAB
B-HCG FREE SERPL-ACNC: <5 MIU/ML
IHCGL IHCGL: NEGATIVE MIU/ML
PATHOLOGY CONSULT NOTE: NORMAL

## 2019-07-16 PROCEDURE — 700102 HCHG RX REV CODE 250 W/ 637 OVERRIDE(OP)

## 2019-07-16 PROCEDURE — 700111 HCHG RX REV CODE 636 W/ 250 OVERRIDE (IP): Performed by: ANESTHESIOLOGY

## 2019-07-16 PROCEDURE — 700105 HCHG RX REV CODE 258: Performed by: OBSTETRICS & GYNECOLOGY

## 2019-07-16 PROCEDURE — 700102 HCHG RX REV CODE 250 W/ 637 OVERRIDE(OP): Performed by: ANESTHESIOLOGY

## 2019-07-16 PROCEDURE — 160009 HCHG ANES TIME/MIN: Performed by: OBSTETRICS & GYNECOLOGY

## 2019-07-16 PROCEDURE — A9270 NON-COVERED ITEM OR SERVICE: HCPCS

## 2019-07-16 PROCEDURE — 501570 HCHG TROCAR, SEPARATOR: Performed by: OBSTETRICS & GYNECOLOGY

## 2019-07-16 PROCEDURE — A4338 INDWELLING CATHETER LATEX: HCPCS | Performed by: OBSTETRICS & GYNECOLOGY

## 2019-07-16 PROCEDURE — 501838 HCHG SUTURE GENERAL: Performed by: OBSTETRICS & GYNECOLOGY

## 2019-07-16 PROCEDURE — 84702 CHORIONIC GONADOTROPIN TEST: CPT

## 2019-07-16 PROCEDURE — 160025 RECOVERY II MINUTES (STATS): Performed by: OBSTETRICS & GYNECOLOGY

## 2019-07-16 PROCEDURE — 160048 HCHG OR STATISTICAL LEVEL 1-5: Performed by: OBSTETRICS & GYNECOLOGY

## 2019-07-16 PROCEDURE — 160035 HCHG PACU - 1ST 60 MINS PHASE I: Performed by: OBSTETRICS & GYNECOLOGY

## 2019-07-16 PROCEDURE — 500002 HCHG ADHESIVE, DERMABOND: Performed by: OBSTETRICS & GYNECOLOGY

## 2019-07-16 PROCEDURE — 502703 HCHG DEVICE, LIGASURE V SEALER: Performed by: OBSTETRICS & GYNECOLOGY

## 2019-07-16 PROCEDURE — 501574 HCHG TROCAR, SMTH CAN&SEAL 5: Performed by: OBSTETRICS & GYNECOLOGY

## 2019-07-16 PROCEDURE — 700101 HCHG RX REV CODE 250: Performed by: ANESTHESIOLOGY

## 2019-07-16 PROCEDURE — 501582 HCHG TROCAR, THRD BLADED: Performed by: OBSTETRICS & GYNECOLOGY

## 2019-07-16 PROCEDURE — 160002 HCHG RECOVERY MINUTES (STAT): Performed by: OBSTETRICS & GYNECOLOGY

## 2019-07-16 PROCEDURE — A9270 NON-COVERED ITEM OR SERVICE: HCPCS | Performed by: ANESTHESIOLOGY

## 2019-07-16 PROCEDURE — 160046 HCHG PACU - 1ST 60 MINS PHASE II: Performed by: OBSTETRICS & GYNECOLOGY

## 2019-07-16 PROCEDURE — 160029 HCHG SURGERY MINUTES - 1ST 30 MINS LEVEL 4: Performed by: OBSTETRICS & GYNECOLOGY

## 2019-07-16 PROCEDURE — 500886 HCHG PACK, LAPAROSCOPY: Performed by: OBSTETRICS & GYNECOLOGY

## 2019-07-16 PROCEDURE — 160036 HCHG PACU - EA ADDL 30 MINS PHASE I: Performed by: OBSTETRICS & GYNECOLOGY

## 2019-07-16 PROCEDURE — 88302 TISSUE EXAM BY PATHOLOGIST: CPT

## 2019-07-16 PROCEDURE — 160041 HCHG SURGERY MINUTES - EA ADDL 1 MIN LEVEL 4: Performed by: OBSTETRICS & GYNECOLOGY

## 2019-07-16 PROCEDURE — 700101 HCHG RX REV CODE 250: Performed by: OBSTETRICS & GYNECOLOGY

## 2019-07-16 PROCEDURE — 501586 HCHG TROCAR, THRD SPIKE 5X55: Performed by: OBSTETRICS & GYNECOLOGY

## 2019-07-16 RX ORDER — SODIUM CHLORIDE, SODIUM LACTATE, POTASSIUM CHLORIDE, CALCIUM CHLORIDE 600; 310; 30; 20 MG/100ML; MG/100ML; MG/100ML; MG/100ML
INJECTION, SOLUTION INTRAVENOUS CONTINUOUS
Status: DISCONTINUED | OUTPATIENT
Start: 2019-07-16 | End: 2019-07-16 | Stop reason: HOSPADM

## 2019-07-16 RX ORDER — DEXMEDETOMIDINE HYDROCHLORIDE 100 UG/ML
INJECTION, SOLUTION INTRAVENOUS PRN
Status: DISCONTINUED | OUTPATIENT
Start: 2019-07-16 | End: 2019-07-16 | Stop reason: SURG

## 2019-07-16 RX ORDER — CEFAZOLIN SODIUM 1 G/3ML
INJECTION, POWDER, FOR SOLUTION INTRAMUSCULAR; INTRAVENOUS PRN
Status: DISCONTINUED | OUTPATIENT
Start: 2019-07-16 | End: 2019-07-16 | Stop reason: SURG

## 2019-07-16 RX ORDER — DIPHENHYDRAMINE HYDROCHLORIDE 50 MG/ML
12.5 INJECTION INTRAMUSCULAR; INTRAVENOUS
Status: DISCONTINUED | OUTPATIENT
Start: 2019-07-16 | End: 2019-07-16 | Stop reason: HOSPADM

## 2019-07-16 RX ORDER — NEOSTIGMINE METHYLSULFATE 1 MG/ML
INJECTION, SOLUTION INTRAVENOUS PRN
Status: DISCONTINUED | OUTPATIENT
Start: 2019-07-16 | End: 2019-07-16 | Stop reason: SURG

## 2019-07-16 RX ORDER — SCOLOPAMINE TRANSDERMAL SYSTEM 1 MG/1
1 PATCH, EXTENDED RELEASE TRANSDERMAL
Status: DISCONTINUED | OUTPATIENT
Start: 2019-07-16 | End: 2019-07-16 | Stop reason: HOSPADM

## 2019-07-16 RX ORDER — DEXMEDETOMIDINE HYDROCHLORIDE 100 UG/ML
INJECTION, SOLUTION INTRAVENOUS
Status: COMPLETED
Start: 2019-07-16 | End: 2019-07-16

## 2019-07-16 RX ORDER — SCOLOPAMINE TRANSDERMAL SYSTEM 1 MG/1
PATCH, EXTENDED RELEASE TRANSDERMAL
Status: DISCONTINUED
Start: 2019-07-16 | End: 2019-07-16 | Stop reason: HOSPADM

## 2019-07-16 RX ORDER — SODIUM CHLORIDE, SODIUM LACTATE, POTASSIUM CHLORIDE, CALCIUM CHLORIDE 600; 310; 30; 20 MG/100ML; MG/100ML; MG/100ML; MG/100ML
INJECTION, SOLUTION INTRAVENOUS CONTINUOUS
Status: DISCONTINUED | OUTPATIENT
Start: 2019-07-16 | End: 2019-07-16

## 2019-07-16 RX ORDER — MIDAZOLAM HYDROCHLORIDE 1 MG/ML
INJECTION INTRAMUSCULAR; INTRAVENOUS
Status: COMPLETED
Start: 2019-07-16 | End: 2019-07-16

## 2019-07-16 RX ORDER — OXYCODONE HYDROCHLORIDE AND ACETAMINOPHEN 5; 325 MG/1; MG/1
2 TABLET ORAL
Status: COMPLETED | OUTPATIENT
Start: 2019-07-16 | End: 2019-07-16

## 2019-07-16 RX ORDER — BUPIVACAINE HYDROCHLORIDE AND EPINEPHRINE 2.5; 5 MG/ML; UG/ML
INJECTION, SOLUTION EPIDURAL; INFILTRATION; INTRACAUDAL; PERINEURAL
Status: DISCONTINUED | OUTPATIENT
Start: 2019-07-16 | End: 2019-07-16 | Stop reason: HOSPADM

## 2019-07-16 RX ORDER — MEPERIDINE HYDROCHLORIDE 25 MG/ML
INJECTION INTRAMUSCULAR; INTRAVENOUS; SUBCUTANEOUS
Status: COMPLETED
Start: 2019-07-16 | End: 2019-07-16

## 2019-07-16 RX ORDER — ONDANSETRON 2 MG/ML
INJECTION INTRAMUSCULAR; INTRAVENOUS PRN
Status: DISCONTINUED | OUTPATIENT
Start: 2019-07-16 | End: 2019-07-16 | Stop reason: SURG

## 2019-07-16 RX ORDER — MEPERIDINE HYDROCHLORIDE 25 MG/ML
INJECTION INTRAMUSCULAR; INTRAVENOUS; SUBCUTANEOUS PRN
Status: DISCONTINUED | OUTPATIENT
Start: 2019-07-16 | End: 2019-07-16 | Stop reason: SURG

## 2019-07-16 RX ORDER — DEXAMETHASONE SODIUM PHOSPHATE 4 MG/ML
INJECTION, SOLUTION INTRA-ARTICULAR; INTRALESIONAL; INTRAMUSCULAR; INTRAVENOUS; SOFT TISSUE PRN
Status: DISCONTINUED | OUTPATIENT
Start: 2019-07-16 | End: 2019-07-16 | Stop reason: SURG

## 2019-07-16 RX ORDER — MEPERIDINE HYDROCHLORIDE 25 MG/ML
12.5 INJECTION INTRAMUSCULAR; INTRAVENOUS; SUBCUTANEOUS
Status: DISCONTINUED | OUTPATIENT
Start: 2019-07-16 | End: 2019-07-16 | Stop reason: HOSPADM

## 2019-07-16 RX ORDER — LIDOCAINE HYDROCHLORIDE 40 MG/ML
SOLUTION TOPICAL
Status: DISCONTINUED
Start: 2019-07-16 | End: 2019-07-16 | Stop reason: HOSPADM

## 2019-07-16 RX ORDER — BUPIVACAINE HYDROCHLORIDE AND EPINEPHRINE 2.5; 5 MG/ML; UG/ML
INJECTION, SOLUTION EPIDURAL; INFILTRATION; INTRACAUDAL; PERINEURAL
Status: DISCONTINUED
Start: 2019-07-16 | End: 2019-07-16 | Stop reason: HOSPADM

## 2019-07-16 RX ORDER — OXYCODONE HYDROCHLORIDE AND ACETAMINOPHEN 5; 325 MG/1; MG/1
1 TABLET ORAL
Status: COMPLETED | OUTPATIENT
Start: 2019-07-16 | End: 2019-07-16

## 2019-07-16 RX ADMIN — DEXMEDETOMIDINE HYDROCHLORIDE 10 MCG: 100 INJECTION, SOLUTION INTRAVENOUS at 13:54

## 2019-07-16 RX ADMIN — DEXMEDETOMIDINE HYDROCHLORIDE 10 MCG: 100 INJECTION, SOLUTION INTRAVENOUS at 14:22

## 2019-07-16 RX ADMIN — SCOPALAMINE 1 PATCH: 1 PATCH, EXTENDED RELEASE TRANSDERMAL at 13:13

## 2019-07-16 RX ADMIN — MEPERIDINE HYDROCHLORIDE 25 MG: 25 INJECTION INTRAMUSCULAR; INTRAVENOUS; SUBCUTANEOUS at 13:56

## 2019-07-16 RX ADMIN — ALFENTANIL HYDROCHLORIDE 1000 MCG: 500 INJECTION, SOLUTION INTRAVENOUS at 13:56

## 2019-07-16 RX ADMIN — DEXMEDETOMIDINE HYDROCHLORIDE 10 MCG: 100 INJECTION, SOLUTION INTRAVENOUS at 14:28

## 2019-07-16 RX ADMIN — DEXAMETHASONE SODIUM PHOSPHATE 4 MG: 4 INJECTION, SOLUTION INTRA-ARTICULAR; INTRALESIONAL; INTRAMUSCULAR; INTRAVENOUS; SOFT TISSUE at 13:50

## 2019-07-16 RX ADMIN — NEOSTIGMINE METHYLSULFATE 1 MG: 1 INJECTION INTRAVENOUS at 14:38

## 2019-07-16 RX ADMIN — NEOSTIGMINE METHYLSULFATE 1.5 MG: 1 INJECTION INTRAVENOUS at 14:37

## 2019-07-16 RX ADMIN — GLYCOPYRROLATE 0.3 MG: 0.2 INJECTION INTRAMUSCULAR; INTRAVENOUS at 14:37

## 2019-07-16 RX ADMIN — GLYCOPYRROLATE 0.2 MG: 0.2 INJECTION INTRAMUSCULAR; INTRAVENOUS at 14:22

## 2019-07-16 RX ADMIN — PROPOFOL 200 MG: 10 INJECTION, EMULSION INTRAVENOUS at 13:56

## 2019-07-16 RX ADMIN — GLYCOPYRROLATE 0.2 MG: 0.2 INJECTION INTRAMUSCULAR; INTRAVENOUS at 14:38

## 2019-07-16 RX ADMIN — CEFAZOLIN 2 G: 330 INJECTION, POWDER, FOR SOLUTION INTRAMUSCULAR; INTRAVENOUS at 13:54

## 2019-07-16 RX ADMIN — MEPERIDINE HYDROCHLORIDE 12.5 MG: 25 INJECTION INTRAMUSCULAR; INTRAVENOUS; SUBCUTANEOUS at 14:26

## 2019-07-16 RX ADMIN — ROCURONIUM BROMIDE 20 MG: 10 INJECTION, SOLUTION INTRAVENOUS at 13:56

## 2019-07-16 RX ADMIN — ONDANSETRON 4 MG: 2 INJECTION INTRAMUSCULAR; INTRAVENOUS at 13:53

## 2019-07-16 RX ADMIN — SCOLOPAMINE TRANSDERMAL SYSTEM 1 PATCH: 1 PATCH, EXTENDED RELEASE TRANSDERMAL at 13:13

## 2019-07-16 RX ADMIN — SODIUM CHLORIDE, POTASSIUM CHLORIDE, SODIUM LACTATE AND CALCIUM CHLORIDE: 600; 310; 30; 20 INJECTION, SOLUTION INTRAVENOUS at 12:03

## 2019-07-16 RX ADMIN — MEPERIDINE HYDROCHLORIDE 12.5 MG: 25 INJECTION INTRAMUSCULAR; INTRAVENOUS; SUBCUTANEOUS at 14:27

## 2019-07-16 RX ADMIN — MIDAZOLAM HYDROCHLORIDE 2 MG: 1 INJECTION, SOLUTION INTRAMUSCULAR; INTRAVENOUS at 13:53

## 2019-07-16 RX ADMIN — OXYCODONE HYDROCHLORIDE AND ACETAMINOPHEN 2 TABLET: 5; 325 TABLET ORAL at 15:30

## 2019-07-16 RX ADMIN — DEXMEDETOMIDINE HYDROCHLORIDE 10 MCG: 100 INJECTION, SOLUTION INTRAVENOUS at 13:56

## 2019-07-16 ASSESSMENT — PAIN SCALES - GENERAL: PAIN_LEVEL: 0

## 2019-07-16 NOTE — PROCEDURES
I and D  Date/Time: 7/15/2019 7:44 PM  Performed by: JOSE ANNE  Authorized by: JOSE ANNE   Type: abscess  Body area: anogenital  Location details: Bartholin's gland    Anesthesia:  Local Anesthetic: topical anesthetic and lidocaine spray  Needle gauge: 18  Incision type: single straight  Incision depth: dermal  Complexity: simple  Drainage: purulent  Drainage amount: scant  Wound treatment: wound left open  Patient tolerance: Patient tolerated the procedure well with no immediate complications  Comments: Polysporin nonadhesive gauze placed today.  Patient was agreeable to possible risks and side effects that were discussed before procedure.  Patient reports immediate relief of symptoms following procedure.

## 2019-07-16 NOTE — ANESTHESIA QCDR
2019 [facility] Clinical Data Registry (for Quality Improvement)     Postoperative nausea/vomiting risk protocol (Adult = 18 yrs and Pediatric 3-17 yrs)- (430 and 463)  General inhalation anesthetic (NOT TIVA) with PONV risk factors: Yes  Provision of anti-emetic therapy with at least 2 different classes of agents: Yes   Patient DID NOT receive anti-emetic therapy and reason is documented in Medical Record:  N/A    Multimodal Pain Management- (AQI59)  Patient undergoing Elective Surgery (i.e. Outpatient, or ASC, or Prescheduled Surgery prior to Hospital Admission): Yes  Use of Multimodal Pain Management, two or more drugs and/or interventions, NOT including systemic opioids: Yes   Exception: Documented allergy to multiple classes of analgesics:  N/A    PACU assessment of acute postoperative pain prior to Anesthesia Care End- Applies to Patients Age = 18- (ABG7)  Initial PACU pain score is which of the following: < 7/10  Patient unable to report pain score: N/A    Post-anesthetic transfer of care checklist/protocol to PACU/ICU- (426 and 427)  Upon conclusion of case, patient transferred to which of the following locations: PACU/Non-ICU  Use of transfer checklist/protocol: Yes  Exclusion: Service Performed in Patient Hospital Room (and thus did not require transfer): N/A    PACU Reintubation- (AQI31)  General anesthesia requiring endotracheal intubation (ETT) along with subsequent extubation in OR or PACU: Yes  Required reintubation in the PACU: No   Extubation was a planned trial documented in the medical record prior to removal of the original airway device:  N/A    Unplanned admission to ICU related to anesthesia service up through end of PACU care- (MD51)  Unplanned admission to ICU (not initially anticipated at anesthesia start time): No

## 2019-07-16 NOTE — OR NURSING
1452- Pt arrives from OR and report received.  Incisions CDI.      1530- Pt c/o pain, unable to rate, tolerating sips of water, medicated with 2 percocets.    1550- Pt resting, no orders, spoke with Dr Sky, orders received.    1620- Pt sleeping, no distress noted.  Pt's spouse updated by Cecily.    1700- Pt up to bathroom, voided without difficulty, pt changed into her clothes and placed in recliner.   brought to bedside.  Pt meets phase 2 criteria.    1725- Pt and  provided with d/c paperwork and instructions, scripts already filled.  All questions answered.    1728- IV removed, pt taken out via w/c.

## 2019-07-16 NOTE — DISCHARGE INSTRUCTIONS
ACTIVITY: Rest and take it easy for the first 24 hours.  A responsible adult is recommended to remain with you during that time.  It is normal to feel sleepy.  We encourage you to not do anything that requires balance, judgment or coordination.    MILD FLU-LIKE SYMPTOMS ARE NORMAL. YOU MAY EXPERIENCE GENERALIZED MUSCLE ACHES, THROAT IRRITATION, HEADACHE AND/OR SOME NAUSEA.    FOR 24 HOURS DO NOT:  Drive, operate machinery or run household appliances.  Drink beer or alcoholic beverages.   Make important decisions or sign legal documents.    SPECIAL INSTRUCTIONS: See attached sheet.    No heavy lifting.  Pelvic rest.    DIET: To avoid nausea, slowly advance diet as tolerated, avoiding spicy or greasy foods for the first day.  Add more substantial food to your diet according to your physician's instructions.  Babies can be fed formula or breast milk as soon as they are hungry.  INCREASE FLUIDS AND FIBER TO AVOID CONSTIPATION.    SURGICAL DRESSING/BATHING: Okay to shower after 24 hours.  No hot tubs, no tub baths, and no swimming until cleared by your doctor.    FOLLOW-UP APPOINTMENT:  A follow-up appointment should be arranged with your doctor; call to schedule.    You should CALL YOUR PHYSICIAN if you develop:  Fever greater than 101 degrees F.  Pain not relieved by medication, or persistent nausea or vomiting.  Excessive bleeding (blood soaking through dressing) or unexpected drainage from the wound.  Extreme redness or swelling around the incision site, drainage of pus or foul smelling drainage.  Inability to urinate or empty your bladder within 8 hours.  Problems with breathing or chest pain.    You should call 911 if you develop problems with breathing or chest pain.  If you are unable to contact your doctor or surgical center, you should go to the nearest emergency room or urgent care center.  Physician's telephone #: [phone]    If any questions arise, call your doctor.  If your doctor is not available,  please feel free to call the Surgical Center at [phone].  The Center is open Monday through Friday from 7AM to 7PM.  You can also call the HEALTH HOTLINE open 24 hours/day, 7 days/week and speak to a nurse at [phone], or toll free at [phone].    A registered nurse may call you a few days after your surgery to see how you are doing after your procedure.    MEDICATIONS: Resume taking daily medication.  Take prescribed pain medication with food.  If no medication is prescribed, you may take non-aspirin pain medication if needed.  PAIN MEDICATION CAN BE VERY CONSTIPATING.  Take a stool softener or laxative such as senokot, pericolace, or milk of magnesia if needed.    Prescription given for PERCOCET, PHENERGAN, and DIFLUCAN.  Last pain medication given at 3:30pm.    If your physician has prescribed pain medication that includes Acetaminophen (Tylenol), do not take additional Acetaminophen (Tylenol) while taking the prescribed medication.    Depression / Suicide Risk    As you are discharged from this [facility] facility, it is important to learn how to keep safe from harming yourself.    Recognize the warning signs:  · Abrupt changes in personality, positive or negative- including increase in energy   · Giving away possessions  · Change in eating patterns- significant weight changes-  positive or negative  · Change in sleeping patterns- unable to sleep or sleeping all the time   · Unwillingness or inability to communicate  · Depression  · Unusual sadness, discouragement and loneliness  · Talk of wanting to die  · Neglect of personal appearance   · Rebelliousness- reckless behavior  · Withdrawal from people/activities they love  · Confusion- inability to concentrate     If you or a loved one observes any of these behaviors or has concerns about self-harm, here's what you can do:  · Talk about it- your feelings and reasons for harming yourself  · Remove any means that you might use to hurt yourself  (examples: pills, rope, extension cords, firearm)  · Get professional help from the community (Mental Health, Substance Abuse, psychological counseling)  · Do not be alone:Call your Safe Contact- someone whom you trust who will be there for you.  · Call your local CRISIS HOTLINE 843-1612 or 046-947-2758  · Call your local Children's Mobile Crisis Response Team [place] (176) 439-7276 or www.[employer]  · Call the toll free National Suicide Prevention Hotlines   · National Suicide Prevention Lifeline 398-904-UYKR (6049)  · National Hope Line Network 800-SUICIDE (577-0195)

## 2019-07-16 NOTE — ANESTHESIA PROCEDURE NOTES
Airway  Date/Time: 7/16/2019 2:04 PM  Performed by: SALBADOR WALSH JR  Authorized by: SALBADOR WALSH JR     Location:  OR  Urgency:  Elective  Indications for Airway Management:  Anesthesia  Spontaneous Ventilation: absent    Sedation Level:  Deep  Preoxygenated: Yes    Patient Position:  Sniffing  Final Airway Type:  Endotracheal airway  Final Endotracheal Airway:  ETT  Cuffed: Yes    Technique Used for Successful ETT Placement:  Direct laryngoscopy  Insertion Site:  Oral  Blade Type:  Shannan  Laryngoscope Blade/Videolaryngoscope Blade Size:  3  ETT Size (mm):  7.0  Measured from:  Teeth  ETT to Teeth (cm):  21  Placement Verified by: auscultation and capnometry    Cormack-Lehane Classification:  Grade I - full view of glottis  Number of Attempts at Approach:  1

## 2019-07-16 NOTE — ANESTHESIA POSTPROCEDURE EVALUATION
Patient: Kate Paul Backner    Procedure Summary     Date:  07/16/19 Room / Location:  [facility] ROOM 22 / SURGERY SAME DAY [facility]    Anesthesia Start:  1350 Anesthesia Stop:  1454    Procedures:       PELVISCOPY (N/A Abdomen)      SALPINGECTOMY (Bilateral Abdomen) Diagnosis:  (PERMANENT STERILIZATION)    Surgeon:  Cristine Godfrey M.D. Responsible Provider:  Weston Bruner Jr., M.D.    Anesthesia Type:  general ASA Status:  3          Final Anesthesia Type: general  Last vitals  BP   NIBP: 128/69    Temp   37.4 °C (99.3 °F)    Pulse   Pulse: 82   Resp   16    SpO2   99 %      Anesthesia Post Evaluation    Patient location during evaluation: PACU  Patient participation: complete - patient participated  Level of consciousness: awake and alert  Pain score: 0    Airway patency: patent  Anesthetic complications: no  Cardiovascular status: hemodynamically stable  Respiratory status: acceptable  Hydration status: euvolemic    PONV: none           Nurse Pain Score: 0 (NPRS)      97/55  100%  67  12  97.3

## 2019-07-16 NOTE — ANESTHESIA PREPROCEDURE EVALUATION
Relevant Problems   (+) PUD (peptic ulcer disease)       Physical Exam    Airway   Mallampati: II  TM distance: >3 FB  Neck ROM: full       Cardiovascular   Rhythm: regular  Rate: normal     Dental    Pulmonary   Breath sounds clear to auscultation     Abdominal    Neurological - normal exam                 Anesthesia Plan    ASA 3 (factror V leiden variant nwith bh/ondvt and PE, nbipolar, nsmoker,n obese)   ASA physical status 3 criteria: other (comment)    Plan - general       Airway plan will be ETT      Plan Factors:   Patient was previously instructed to abstain from smoking on day of procedure.      Induction: intravenous      Pertinent diagnostic labs and testing reviewed    Informed Consent:    Anesthetic plan and risks discussed with patient.

## 2019-07-16 NOTE — PROGRESS NOTES
Subjective:   Kate Shelton is a 33 y.o. female who presents today with   Chief Complaint   Patient presents with   • Cyst     x5days, cyst/boil on labia, getting bigger and more painful       Cyst   This is a new problem. The current episode started in the past 7 days. The problem occurs constantly. The problem has been gradually worsening. Associated symptoms include a fever. Pertinent negatives include no abdominal pain or chills.   Patient attempted to apply pressure herself but was unable to get anything out of the abscess.  It has become very painful and she has noticed it doubled in size since yesterday.    PMH:  has a past medical history of Anxiety; Bipolar 1 disorder ([facility]); Depression; DVT of upper extremity (deep vein thrombosis) ([facility]); DVT, lower extremity, recurrent ([facility]) (2015); GERD (gastroesophageal reflux disease); IBD (inflammatory bowel disease); PTSD (post-traumatic stress disorder); PUD (peptic ulcer disease); and Pulmonary embolism ([facility]) (2019).  MEDS:   Current Outpatient Prescriptions:   •  sulfamethoxazole-trimethoprim (BACTRIM DS) 800-160 MG tablet, Take 1 Tab by mouth 2 times a day for 10 days., Disp: 20 Tab, Rfl: 0  •  rivaroxaban (XARELTO) 20 MG Tab tablet, Take 1 Tab by mouth with dinner., Disp: 90 Tab, Rfl: 1  •  zolpidem (AMBIEN) 10 MG Tab, Take 10 mg by mouth at bedtime as needed., Disp: , Rfl: 0  •  ALPRAZolam (XANAX) 0.5 MG Tab, Take 0.5 mg by mouth at bedtime as needed for Sleep., Disp: , Rfl:   ALLERGIES:   Allergies   Allergen Reactions   • Nsaids Unspecified     Hx ulcers   • Food Vomiting     seafood   • Ondansetron Unspecified     Constipation       SURGHX:   Past Surgical History:   Procedure Laterality Date   • VENTRAL HERNIA REPAIR N/A 2/12/2019    Procedure: VENTRAL HERNIA REPAIR-  WITH PRIMARY REPAIR;  Surgeon: Adria Bob M.D.;  Location: SURGERY SAME DAY [facility];  Service: General   • MAMMOPLASTY AUGMENTATION  2016     SOCHX:  reports that she has  "been smoking Cigarettes.  She has a 2.00 pack-year smoking history. She has never used smokeless tobacco. She reports that she does not drink alcohol or use drugs.  FH: Reviewed with patient, not pertinent to this visit.       Review of Systems   Constitutional: Positive for fever. Negative for chills.   Gastrointestinal: Negative for abdominal pain.   Genitourinary: Negative for dysuria, frequency and urgency.        Right sided labia pain and swelling   All other systems reviewed and are negative.       Objective:   /78   Pulse 100   Temp 38 °C (100.4 °F) (Temporal)   Ht 1.549 m (5' 1\")   Wt 78.5 kg (173 lb)   LMP 06/27/2019   SpO2 95%   BMI 32.69 kg/m²   Physical Exam   Constitutional: She appears well-developed and well-nourished. No distress.   HENT:   Head: Normocephalic and atraumatic.   Right Ear: Hearing normal.   Left Ear: Hearing normal.   Cardiovascular: Normal rate, regular rhythm and normal heart sounds.    Pulmonary/Chest: Effort normal and breath sounds normal.   Genitourinary:         Genitourinary Comments: Approximately 1 cm abscess on the right sided labia erythematous and fluctuant.   Musculoskeletal:   Normal movement in all 4 extremities   Neurological: She is alert. Coordination normal.   Skin: Skin is warm and dry.   Psychiatric: She has a normal mood and affect.   Nursing note and vitals reviewed.      Before procedure was done discussed with patient that she should let her OB/GYN know that she had a procedure done today and was started on antibiotics prior to her tubal ligation that will be done tomorrow.  Patient agrees with this plan and would like to move forward with the procedure today to get rid of the pressure and pain associated with abscess.  Assessment/Plan:   Assessment    1. Abscess of right genital labia  - sulfamethoxazole-trimethoprim (BACTRIM DS) 800-160 MG tablet; Take 1 Tab by mouth 2 times a day for 10 days.  Dispense: 20 Tab; Refill: 0  Patient given wound " care instructions today and Polysporin and dressing placed following procedure in clinic which she tolerated well.  Patient will finish out antibiotics.  Patient will be in close follow-up with her OB/GYN.  Differential diagnosis, natural history, supportive care, and indications for immediate follow-up discussed.   Patient given instructions and understanding of medications and treatment.    If not improving in 3-5 days, F/U with PCP or return to  if symptoms worsen.    Patient agreeable to plan.      Please note that this dictation was created using voice recognition software. I have made every reasonable attempt to correct obvious errors, but I expect that there are errors of grammar and possibly content that I did not discover before finalizing the note.    Rik Lomax PA-C

## 2019-07-16 NOTE — ANESTHESIA TIME REPORT
Anesthesia Start and Stop Event Times     Date Time Event    7/16/2019 1350 Anesthesia Start     1454 Anesthesia Stop        Responsible Staff  07/16/19    Name Role Begin End    Weston Bruner Jr., M.D. Anesth 1350 1454        Preop Diagnosis (Free Text):  Pre-op Diagnosis     PERMANENT STERILIZATION        Preop Diagnosis (Codes):  Diagnosis Information     Diagnosis Code(s):         Post op Diagnosis  Sterilization      Premium Reason  Non-Premium    Comments:

## 2019-07-17 NOTE — OP REPORT
OPERATIVE REPORT     PREOPERATIVE DIAGNOSIS:  Multiparity, desires permanent sterilization.     POSTOPERATIVE DIAGNOSES:  Multiparity, desires permanent sterilization.     PROCEDURE:  Laparoscopic Bilateral Salpingectomy     SURGEON:  Cristine Carpenter MD     ASSISTANT:  None     ANESTHESIOLOGIST:  Weston Bruner MD     ANESTHESIA:  General.     SPECIMEN:  None.     ESTIMATED BLOOD LOSS:  Less than 10 Ml.     FINDINGS:  Uterus and bilateral tubes and ovaries grossly normal.     COMPLICATIONS:  None.     PATIENT CONDITION:  To the PACU in stable condition.     DESCRIPTION OF PROCEDURE:  The patient was discussed and reviewed   about the risks, benefits, indications and alternatives of the procedure.  She was   agreeable to proceed and signed informed consents.     Patient was then taken to the operating room with IV running.  She was then   placed in dorsal lithotomy position, prepped and draped in the normal   usual sterile fashion.  After adequate level of general anesthesia was   administered, formal time-out and preoperative antibiotics were given.    Examination under anesthesia was done.  Urinary bladder was emptied of clear  urine with a straight catheter.  A sponge stick was placed into the vagina for   uterine manipulation.  The legs were then deflexed in preparation for the   laparoscopic procedure.     A 5 mm incision was then placed in the umbilical area.  The pelvic cavity was  entered under direct visualization with use of the opti-donovan laparoscopically.    Once the pelvic cavity and abdominal cavity had been entered,   CO2 insufflation was then done at 14 mmHg at 3-4 liters of CO2.  Good visualization of the pelvic cavity was attained after patient was placed on Trendelenburg position   2 5 mm ports were placed: one at the suprapubic and one a the left lateral 2 cm anterior and superior to the ASIS.   The left fallopian tube fimbriae was grasped with the Prestige and the  LigaSure  bipolar   was then used to cauterize and cut at the mesosalpingeal portion from the fimbrial end extending to the isthmic part of the left fallopian tube. The left FT was delivered out of the 5 mm port. There was excellent hemostasis.  The right fallopian tube was subsequently grasped at the fimbrial portion. The ligasure was then used to cauterize and cut the mesosalpinx from the fimbriae to the isthmic portion of the right fallopian tube. The right fallopian tube was the delivered through the 5 mm port.  There was no bleeding that was   noted.  The procedure was then terminated.  CO2 was insufflated.  All   instruments were removed.  Skin was reapproximated using subcuticularly   using Vicryl 4-0.  There were no intraoperative complications noted.  Sharps,   needles, instruments and sponges were correct x2.  Patient tolerated the   procedure well.  There were no other intraoperative complications noted and   the patient was sent to the PACU awake and in good condition.        ____________________________________     STEPHANIE DHALIWAL. SAMMI SPEAR MD

## 2019-07-17 NOTE — OR SURGEON
Immediate Post OP Note    PreOp Diagnosis: Multiparity, desires permanent sterilization     PostOp Diagnosis: same    Procedure(s):  PELVISCOPY - Wound Class: Clean Contaminated  BILATERAL SALPINGECTOMY - Wound Class: Clean Contaminated    Surgeon(s):  Cristine Godfrey M.D.    Anesthesiologist/Type of Anesthesia:  Anesthesiologist: Weston Bruner Jr., M.D./General    Surgical Staff:  Circulator: Fernanda Paulson R.N.; Marquita Perdue R.N.  Scrub Person: Gifty Milton    Specimens removed if any:  ID Type Source Tests Collected by Time Destination   A : Bilateral Fallopian Tubes Other Other PATHOLOGY SPECIMEN Cristine Godfrey M.D. 7/16/2019  2:37 PM        Estimated Blood Loss: < 5 CC    Findings: uterus bilateral tubes and ovaries grossly normal     Complications: none    7/16/2019 7:34 PM Cristien Godfrey M.D.

## 2019-07-23 ENCOUNTER — APPOINTMENT (OUTPATIENT)
Dept: RADIOLOGY | Facility: MEDICAL CENTER | Age: 34
End: 2019-07-23
Attending: EMERGENCY MEDICINE
Payer: COMMERCIAL

## 2019-07-23 ENCOUNTER — HOSPITAL ENCOUNTER (EMERGENCY)
Facility: MEDICAL CENTER | Age: 34
End: 2019-07-23
Attending: EMERGENCY MEDICINE
Payer: COMMERCIAL

## 2019-07-23 VITALS
RESPIRATION RATE: 30 BRPM | HEIGHT: 61 IN | BODY MASS INDEX: 32.67 KG/M2 | DIASTOLIC BLOOD PRESSURE: 87 MMHG | OXYGEN SATURATION: 99 % | HEART RATE: 74 BPM | SYSTOLIC BLOOD PRESSURE: 122 MMHG | WEIGHT: 173.06 LBS | TEMPERATURE: 98.3 F

## 2019-07-23 DIAGNOSIS — G89.18 POST-OP PAIN: ICD-10-CM

## 2019-07-23 DIAGNOSIS — Z86.711 HISTORY OF PULMONARY EMBOLISM: ICD-10-CM

## 2019-07-23 DIAGNOSIS — R07.89 OTHER CHEST PAIN: ICD-10-CM

## 2019-07-23 LAB
ALBUMIN SERPL BCP-MCNC: 4.3 G/DL (ref 3.2–4.9)
ALBUMIN/GLOB SERPL: 1.7 G/DL
ALP SERPL-CCNC: 70 U/L (ref 30–99)
ALT SERPL-CCNC: 15 U/L (ref 2–50)
ANION GAP SERPL CALC-SCNC: 8 MMOL/L (ref 0–11.9)
AST SERPL-CCNC: 18 U/L (ref 12–45)
BASOPHILS # BLD AUTO: 0.1 % (ref 0–1.8)
BASOPHILS # BLD: 0.01 K/UL (ref 0–0.12)
BILIRUB SERPL-MCNC: 0.7 MG/DL (ref 0.1–1.5)
BUN SERPL-MCNC: 9 MG/DL (ref 8–22)
CALCIUM SERPL-MCNC: 8.8 MG/DL (ref 8.4–10.2)
CHLORIDE SERPL-SCNC: 106 MMOL/L (ref 96–112)
CO2 SERPL-SCNC: 21 MMOL/L (ref 20–33)
CREAT SERPL-MCNC: 0.57 MG/DL (ref 0.5–1.4)
EKG IMPRESSION: NORMAL
EOSINOPHIL # BLD AUTO: 0.16 K/UL (ref 0–0.51)
EOSINOPHIL NFR BLD: 2.1 % (ref 0–6.9)
ERYTHROCYTE [DISTWIDTH] IN BLOOD BY AUTOMATED COUNT: 45.1 FL (ref 35.9–50)
GLOBULIN SER CALC-MCNC: 2.6 G/DL (ref 1.9–3.5)
GLUCOSE SERPL-MCNC: 84 MG/DL (ref 65–99)
HCT VFR BLD AUTO: 42.8 % (ref 37–47)
HGB BLD-MCNC: 14.9 G/DL (ref 12–16)
IMM GRANULOCYTES # BLD AUTO: 0.02 K/UL (ref 0–0.11)
IMM GRANULOCYTES NFR BLD AUTO: 0.3 % (ref 0–0.9)
LYMPHOCYTES # BLD AUTO: 3.02 K/UL (ref 1–4.8)
LYMPHOCYTES NFR BLD: 40.5 % (ref 22–41)
MCH RBC QN AUTO: 32.8 PG (ref 27–33)
MCHC RBC AUTO-ENTMCNC: 34.8 G/DL (ref 33.6–35)
MCV RBC AUTO: 94.3 FL (ref 81.4–97.8)
MONOCYTES # BLD AUTO: 0.69 K/UL (ref 0–0.85)
MONOCYTES NFR BLD AUTO: 9.3 % (ref 0–13.4)
NEUTROPHILS # BLD AUTO: 3.55 K/UL (ref 2–7.15)
NEUTROPHILS NFR BLD: 47.7 % (ref 44–72)
NRBC # BLD AUTO: 0 K/UL
NRBC BLD-RTO: 0 /100 WBC
NT-PROBNP SERPL IA-MCNC: <5 PG/ML (ref 0–125)
PLATELET # BLD AUTO: 214 K/UL (ref 164–446)
PMV BLD AUTO: 11 FL (ref 9–12.9)
POTASSIUM SERPL-SCNC: 3.8 MMOL/L (ref 3.6–5.5)
PROT SERPL-MCNC: 6.9 G/DL (ref 6–8.2)
RBC # BLD AUTO: 4.54 M/UL (ref 4.2–5.4)
SODIUM SERPL-SCNC: 135 MMOL/L (ref 135–145)
TROPONIN T SERPL-MCNC: <6 NG/L (ref 6–19)
WBC # BLD AUTO: 7.5 K/UL (ref 4.8–10.8)

## 2019-07-23 PROCEDURE — 83880 ASSAY OF NATRIURETIC PEPTIDE: CPT

## 2019-07-23 PROCEDURE — 80053 COMPREHEN METABOLIC PANEL: CPT

## 2019-07-23 PROCEDURE — 700117 HCHG RX CONTRAST REV CODE 255: Performed by: EMERGENCY MEDICINE

## 2019-07-23 PROCEDURE — 71275 CT ANGIOGRAPHY CHEST: CPT

## 2019-07-23 PROCEDURE — 93005 ELECTROCARDIOGRAM TRACING: CPT

## 2019-07-23 PROCEDURE — 85025 COMPLETE CBC W/AUTO DIFF WBC: CPT

## 2019-07-23 PROCEDURE — 99284 EMERGENCY DEPT VISIT MOD MDM: CPT

## 2019-07-23 PROCEDURE — 84484 ASSAY OF TROPONIN QUANT: CPT

## 2019-07-23 PROCEDURE — 700102 HCHG RX REV CODE 250 W/ 637 OVERRIDE(OP): Performed by: EMERGENCY MEDICINE

## 2019-07-23 PROCEDURE — A9270 NON-COVERED ITEM OR SERVICE: HCPCS | Performed by: EMERGENCY MEDICINE

## 2019-07-23 PROCEDURE — 93005 ELECTROCARDIOGRAM TRACING: CPT | Performed by: EMERGENCY MEDICINE

## 2019-07-23 PROCEDURE — 36415 COLL VENOUS BLD VENIPUNCTURE: CPT

## 2019-07-23 RX ORDER — OXYCODONE HYDROCHLORIDE AND ACETAMINOPHEN 5; 325 MG/1; MG/1
1 TABLET ORAL ONCE
Status: COMPLETED | OUTPATIENT
Start: 2019-07-23 | End: 2019-07-23

## 2019-07-23 RX ORDER — FLUCONAZOLE 150 MG/1
150 TABLET ORAL ONCE
Status: ON HOLD | COMMUNITY
End: 2019-10-18

## 2019-07-23 RX ORDER — OXYCODONE HYDROCHLORIDE AND ACETAMINOPHEN 5; 325 MG/1; MG/1
1-2 TABLET ORAL EVERY 4 HOURS PRN
Status: SHIPPED | COMMUNITY
End: 2019-07-23

## 2019-07-23 RX ORDER — OXYCODONE HYDROCHLORIDE AND ACETAMINOPHEN 5; 325 MG/1; MG/1
1 TABLET ORAL EVERY 6 HOURS PRN
Qty: 12 TAB | Refills: 0 | Status: SHIPPED | OUTPATIENT
Start: 2019-07-23 | End: 2019-07-26

## 2019-07-23 RX ORDER — PROMETHAZINE HYDROCHLORIDE 25 MG/1
25 TABLET ORAL EVERY 6 HOURS PRN
Status: ON HOLD | COMMUNITY
End: 2019-10-18

## 2019-07-23 RX ORDER — SULFAMETHOXAZOLE AND TRIMETHOPRIM 800; 160 MG/1; MG/1
1 TABLET ORAL 2 TIMES DAILY
Status: ON HOLD | COMMUNITY
Start: 2019-07-15 | End: 2019-10-18

## 2019-07-23 RX ADMIN — IOHEXOL 75 ML: 350 INJECTION, SOLUTION INTRAVENOUS at 16:11

## 2019-07-23 RX ADMIN — OXYCODONE HYDROCHLORIDE AND ACETAMINOPHEN 1 TABLET: 5; 325 TABLET ORAL at 17:00

## 2019-07-23 NOTE — ED PROVIDER NOTES
ED Provider Note    CHIEF COMPLAINT  Chief Complaint   Patient presents with   • Shoulder Pain     Atraumatic pain to R upper chest/R shoulder/R upper arm.  Worse w/ deep inspiration.         HPI  Kate Shelton is a 33 y.o. female who presents to the emergency room complaining of right upper chest pain.  This is been present pain is been there for the last couple of days.  Pain is in the right upper portion of her chest wall is worse when she takes a deep breath.  Feels better if she does not take a deep breath and if she moves slowly.  Denies any other chest pain.  No anterior chest pain or pressure.  She has no associated shortness of breath.  Denies any nausea vomiting diaphoresis.  No tearing pain radiation to the shoulder blades.  Patient is concerned because she has a history of venous thromboembolic disease with both a PE and a DVT in the past.  She has been off her anticoagulant for several days because she had a recent tubal ligation.  That she since restarted on her anticoagulation.  She denies any other aggravating leaving factors or associated complaints.  No pain or swelling her arms or legs.  Surgical incisions are healing well.  No fevers no chill.    The patient has been having some intermittent shoulder pain secondary to insufflation of gas or laparoscopic surgery.  She is not sure if this the same pain or different pain.  She is denying any abdominal pain nausea vomiting he is been eating well drinking well.    REVIEW OF SYSTEMS  See HPI for further details. All other systems are negative.    PAST MEDICAL HISTORY  Past Medical History:   Diagnosis Date   • Anxiety    • Bipolar 1 disorder ([facility])    • Depression    • DVT of upper extremity (deep vein thrombosis) ([facility])     From IV   • DVT, lower extremity, recurrent ([facility]) 2015    hx left arm DVT at age 29   • GERD (gastroesophageal reflux disease)    • IBD (inflammatory bowel disease)    • PTSD (post-traumatic stress disorder)    • PUD (peptic  ulcer disease)    • Pulmonary embolism ([facility]) 2019    PE       FAMILY HISTORY  Family History   Problem Relation Age of Onset   • Psychiatry Mother    • Other Mother         killed in [place]   • Heart Disease Father    • Stroke Father        SOCIAL HISTORY  Social History     Social History   • Marital status:      Spouse name: N/A   • Number of children: N/A   • Years of education: N/A     Social History Main Topics   • Smoking status: Current Some Day Smoker     Packs/day: 0.25     Years: 8.00     Types: Cigarettes     Last attempt to quit: 2018   • Smokeless tobacco: Never Used   • Alcohol use No      Comment: 2/month   • Drug use: No   • Sexual activity: Yes     Partners: Male     Birth control/ protection: Implant     Other Topics Concern   • Not on file     Social History Narrative   • No narrative on file       SURGICAL HISTORY  Past Surgical History:   Procedure Laterality Date   • PB LAP,DIAGNOSTIC ABDOMEN N/A 7/16/2019    Procedure: PELVISCOPY;  Surgeon: Cristine Godfrey M.D.;  Location: SURGERY SAME DAY [facility];  Service: Labor and Delivery   • SALPINGECTOMY Bilateral 7/16/2019    Procedure: SALPINGECTOMY;  Surgeon: Cristine Godfrey M.D.;  Location: SURGERY SAME DAY [facility];  Service: Labor and Delivery   • VENTRAL HERNIA REPAIR N/A 2/12/2019    Procedure: VENTRAL HERNIA REPAIR-  WITH PRIMARY REPAIR;  Surgeon: Adria Bob M.D.;  Location: SURGERY SAME DAY [facility];  Service: General   • MAMMOPLASTY AUGMENTATION  2016       CURRENT MEDICATIONS  Home Medications     Reviewed by Dakota Ayala (Pharmacy Tech) on 07/23/19 at 1443  Med List Status: Complete   Medication Last Dose Status   ALPRAZolam (XANAX) 0.5 MG Tab 7/22/2019 Active   fluconazole (DIFLUCAN) 150 MG tablet 7/18/2019 Active   oxyCODONE-acetaminophen (PERCOCET) 5-325 MG Tab > 3 days Active   promethazine (PHENERGAN) 25 MG Tab 7/20/2019 Active   rivaroxaban (XARELTO) 20 MG Tab tablet  "7/21/2019 Active   sulfamethoxazole-trimethoprim (BACTRIM DS) 800-160 MG tablet 7/22/2019 Active   zolpidem (AMBIEN) 10 MG Tab 7/22/2019 Active                ALLERGIES  Allergies   Allergen Reactions   • Nsaids Unspecified     Hx ulcers   • Food Vomiting     seafood   • Ondansetron Unspecified     Constipation         PHYSICAL EXAM  VITAL SIGNS: /87   Pulse 90   Temp 36.8 °C (98.3 °F) (Temporal)   Resp 17   Ht 1.549 m (5' 1\")   Wt 78.5 kg (173 lb 1 oz)   LMP 06/27/2019 (Exact Date)   SpO2 98%   BMI 32.70 kg/m²    Constitutional: Awake alert nontoxic no acute distress  HENT: Normocephalic, Atraumatic, Bilateral external ears normal, Oropharynx moist, No oral exudates, Nose normal.   Eyes: PERRL, EOMI, Conjunctiva normal, No discharge.   Cardiovascular: Normal heart rate, Normal rhythm, No murmurs, No rubs, No gallops.   Thorax & Lungs: Normal breath sounds, No respiratory distress, No wheezing,    Abdomen: Bowel sounds normal, Soft, No tendernessNo  Skin: Warm, Dry, No erythema, No rash.   Back: No CVA tenderness per  Musculoskeletal: Good range of motion in all major joints.  No asymmetric edema.  Good pulses in all extremities per  Neurologic: Alert, No focal deficits noted.   Psychiatric: Affect normal    Results for orders placed or performed during the hospital encounter of 07/23/19   CBC w/ Differential   Result Value Ref Range    WBC 7.5 4.8 - 10.8 K/uL    RBC 4.54 4.20 - 5.40 M/uL    Hemoglobin 14.9 12.0 - 16.0 g/dL    Hematocrit 42.8 37.0 - 47.0 %    MCV 94.3 81.4 - 97.8 fL    MCH 32.8 27.0 - 33.0 pg    MCHC 34.8 33.6 - 35.0 g/dL    RDW 45.1 35.9 - 50.0 fL    Platelet Count 214 164 - 446 K/uL    MPV 11.0 9.0 - 12.9 fL    Neutrophils-Polys 47.70 44.00 - 72.00 %    Lymphocytes 40.50 22.00 - 41.00 %    Monocytes 9.30 0.00 - 13.40 %    Eosinophils 2.10 0.00 - 6.90 %    Basophils 0.10 0.00 - 1.80 %    Immature Granulocytes 0.30 0.00 - 0.90 %    Nucleated RBC 0.00 /100 WBC    Neutrophils (Absolute) " 3.55 2.00 - 7.15 K/uL    Lymphs (Absolute) 3.02 1.00 - 4.80 K/uL    Monos (Absolute) 0.69 0.00 - 0.85 K/uL    Eos (Absolute) 0.16 0.00 - 0.51 K/uL    Baso (Absolute) 0.01 0.00 - 0.12 K/uL    Immature Granulocytes (abs) 0.02 0.00 - 0.11 K/uL    NRBC (Absolute) 0.00 K/uL   Complete Metabolic Panel (CMP)   Result Value Ref Range    Sodium 135 135 - 145 mmol/L    Potassium 3.8 3.6 - 5.5 mmol/L    Chloride 106 96 - 112 mmol/L    Co2 21 20 - 33 mmol/L    Anion Gap 8.0 0.0 - 11.9    Glucose 84 65 - 99 mg/dL    Bun 9 8 - 22 mg/dL    Creatinine 0.57 0.50 - 1.40 mg/dL    Calcium 8.8 8.4 - 10.2 mg/dL    AST(SGOT) 18 12 - 45 U/L    ALT(SGPT) 15 2 - 50 U/L    Alkaline Phosphatase 70 30 - 99 U/L    Total Bilirubin 0.7 0.1 - 1.5 mg/dL    Albumin 4.3 3.2 - 4.9 g/dL    Total Protein 6.9 6.0 - 8.2 g/dL    Globulin 2.6 1.9 - 3.5 g/dL    A-G Ratio 1.7 g/dL   Troponin STAT   Result Value Ref Range    Troponin T <6 6 - 19 ng/L   proBrain Natriuretic Peptide, NT   Result Value Ref Range    NT-proBNP <5 0 - 125 pg/mL   ESTIMATED GFR   Result Value Ref Range    GFR If African American >60 >60 mL/min/1.73 m 2    GFR If Non African American >60 >60 mL/min/1.73 m 2   EKG   Result Value Ref Range    Report       [facility]    Test Date:  2019  Pt Name:    ISIDRA LAMAR              Department: [facility]  MRN:        [id number]                      Room:  Gender:     Female                       Technician: TRINI  :        [id number]                   Requested By:ER TRIAGE PROTOCOL  Order #:    [id number]                    Reading MD:    Measurements  Intervals                                Axis  Rate:       76                           P:          66  NM:         133                          QRS:        49  QRSD:       95                           T:          51  QT:         362  QTc:        408    Interpretive Statements  Sinus rhythm  Multiple premature complexes, vent & supraven  Compared to ECG  11/01/2018 12:53:16  Poor R-wave progression no longer present  T-wave abnormality no longer present  Myocardial infarct finding no longer present  Possible ischemia no longer present  Inferior Q waves no longer present  Q waves no longer present          RADIOLOGY/PROCEDURES  CT-CTA CHEST PULMONARY ARTERY W/ RECONS   Final Result      1.  Incomplete filling defect within a right upper lobe segmental vessel in an area of previously seen pulmonary embolus. This likely represents chronic pulmonary embolus in that single-vessel. No large central embolus seen.      2.  Linear atelectasis within the lung bases.      This was discussed with SHELLY MEIER at 4:45 PM on 7/23/2019.            COURSE & MEDICAL DECISION MAKING  Pertinent Labs & Imaging studies reviewed. (See chart for details)  The patient presents emerged department with chest pain.  The pain is in the right upper portion of her chest wall and is pleuritic in nature.    Broad official diagnosis was considered including but not limited to pneumonia, pulmonary embolism, less likely PE.  Also consider muscular skeletal chest pain or referred pain.    Her EKG is unremarkable her troponin is normal after several hours of pain her pain is atypical for cardiac disease.  I do not think this is ACS.    CBC is unremarkable.  No leukocytosis metabolic panel is normal.  BNP and troponin are negative I do not this is cardiac.    Patient had a recent negative pregnancy test before she had her tubal ligation.    The pain could be a pulmonary embolism therefore I did a CT.  She does have a filling defect in right upper lobe which is seen in previous CTs and is felt likely a chronic area of embolus.      Patient is given Percocet for discomfort.  At this point this still could be referred pain from abdominal operation or exposure could be chronic pain or could be related to the abnormality was seen on the chest CT.    At this point I think she can be managed as an  outpatient.  She has a follow-up plan with her doctor.  We will put her back on Percocet number follow-up she will return for worsening pain, shortness of breath fever or other concerns.      She is requesting refill of her Percocet that she can see her doctor.  I think this is reasonable.    In prescribing controlled substances to this patient, I certify that I have obtained and reviewed the medical history of Kate Shelton. I have also made a good jet effort to obtain applicable records from other providers who have treated the patient and records did not demonstrate any increased risk of substance abuse that would prevent me from prescribing controlled substances.     I have conducted a physical exam and documented it. I have reviewed Ms. Shelton’s prescription history as maintained by the [place] Prescription Monitoring Program.     I have assessed the patient’s risk for abuse, dependency, and addiction using the validated Opioid Risk Tool available at https://www.mdcalc.com/xeancy-woba-yvmn-ort-narcotic-abuse.     Given the above, I believe the benefits of controlled substance therapy outweigh the risks. The reasons for prescribing controlled substances include non-narcotic, oral analgesic alternatives have been inadequate for pain control. Accordingly, I have discussed the risk and benefits, treatment plan, and alternative therapies with the patient.       FINAL IMPRESSION  1. Other chest pain    2. History of pulmonary embolism    3. Post-op pain        2.   3.         Electronically signed by: Jass Yadav, 7/23/2019 3:32 PM  \

## 2019-07-23 NOTE — ED NOTES
Patient states that she has 6/10 shoulder pain that increases to 8/10 with a deep breath. Patient mentions that she has a clotting disorder that she takes Xeralto for, she stopped it a couple of days ago to get her eye brows micro bladed.      She is 10 weeks post partum.  She had laparoscopic surgery 1 week ago and was thinking that she shouldn't have air in her abd to cause shoulder pain.    Hx of DVT and PE.

## 2019-07-24 NOTE — DISCHARGE INSTRUCTIONS
Follow-up with your doctor and your OB/GYN doctor.  Continue your anticoagulation.  Return if you have more pain, shortness of breath fevers or other concerns.

## 2019-10-18 ENCOUNTER — ANESTHESIA (OUTPATIENT)
Dept: SURGERY | Facility: MEDICAL CENTER | Age: 34
End: 2019-10-18
Payer: COMMERCIAL

## 2019-10-18 ENCOUNTER — HOSPITAL ENCOUNTER (OUTPATIENT)
Facility: MEDICAL CENTER | Age: 34
End: 2019-10-18
Attending: SPECIALIST | Admitting: SPECIALIST
Payer: COMMERCIAL

## 2019-10-18 ENCOUNTER — ANESTHESIA EVENT (OUTPATIENT)
Dept: SURGERY | Facility: MEDICAL CENTER | Age: 34
End: 2019-10-18
Payer: COMMERCIAL

## 2019-10-18 VITALS
BODY MASS INDEX: 32.2 KG/M2 | OXYGEN SATURATION: 100 % | SYSTOLIC BLOOD PRESSURE: 115 MMHG | DIASTOLIC BLOOD PRESSURE: 83 MMHG | WEIGHT: 170.42 LBS | TEMPERATURE: 97.7 F | HEART RATE: 70 BPM | RESPIRATION RATE: 15 BRPM

## 2019-10-18 DIAGNOSIS — G89.18 POST-OP PAIN: ICD-10-CM

## 2019-10-18 PROBLEM — N92.0 MENORRHAGIA: Status: ACTIVE | Noted: 2019-10-18

## 2019-10-18 PROBLEM — D68.51 FACTOR V LEIDEN (HCC): Status: ACTIVE | Noted: 2019-10-18

## 2019-10-18 LAB
B-HCG FREE SERPL-ACNC: <5 MIU/ML
IHCGL IHCGL: NEGATIVE MIU/ML
PATHOLOGY CONSULT NOTE: NORMAL

## 2019-10-18 PROCEDURE — 160029 HCHG SURGERY MINUTES - 1ST 30 MINS LEVEL 4: Performed by: SPECIALIST

## 2019-10-18 PROCEDURE — 160046 HCHG PACU - 1ST 60 MINS PHASE II: Performed by: SPECIALIST

## 2019-10-18 PROCEDURE — 700105 HCHG RX REV CODE 258: Performed by: SPECIALIST

## 2019-10-18 PROCEDURE — 501838 HCHG SUTURE GENERAL: Performed by: SPECIALIST

## 2019-10-18 PROCEDURE — A9270 NON-COVERED ITEM OR SERVICE: HCPCS | Performed by: ANESTHESIOLOGY

## 2019-10-18 PROCEDURE — 502587 HCHG PACK, D&C: Performed by: SPECIALIST

## 2019-10-18 PROCEDURE — 700111 HCHG RX REV CODE 636 W/ 250 OVERRIDE (IP): Performed by: ANESTHESIOLOGY

## 2019-10-18 PROCEDURE — 160048 HCHG OR STATISTICAL LEVEL 1-5: Performed by: SPECIALIST

## 2019-10-18 PROCEDURE — 160009 HCHG ANES TIME/MIN: Performed by: SPECIALIST

## 2019-10-18 PROCEDURE — 160041 HCHG SURGERY MINUTES - EA ADDL 1 MIN LEVEL 4: Performed by: SPECIALIST

## 2019-10-18 PROCEDURE — 160035 HCHG PACU - 1ST 60 MINS PHASE I: Performed by: SPECIALIST

## 2019-10-18 PROCEDURE — 84702 CHORIONIC GONADOTROPIN TEST: CPT

## 2019-10-18 PROCEDURE — 700102 HCHG RX REV CODE 250 W/ 637 OVERRIDE(OP): Performed by: ANESTHESIOLOGY

## 2019-10-18 PROCEDURE — A9270 NON-COVERED ITEM OR SERVICE: HCPCS

## 2019-10-18 PROCEDURE — 160025 RECOVERY II MINUTES (STATS): Performed by: SPECIALIST

## 2019-10-18 PROCEDURE — 501394 HCHG SOLUTION SORBITOL 3% 3000ML BAG: Performed by: SPECIALIST

## 2019-10-18 PROCEDURE — 160002 HCHG RECOVERY MINUTES (STAT): Performed by: SPECIALIST

## 2019-10-18 PROCEDURE — 88305 TISSUE EXAM BY PATHOLOGIST: CPT

## 2019-10-18 PROCEDURE — 700102 HCHG RX REV CODE 250 W/ 637 OVERRIDE(OP)

## 2019-10-18 RX ORDER — HYDROMORPHONE HYDROCHLORIDE 1 MG/ML
0.2 INJECTION, SOLUTION INTRAMUSCULAR; INTRAVENOUS; SUBCUTANEOUS
Status: DISCONTINUED | OUTPATIENT
Start: 2019-10-18 | End: 2019-10-18 | Stop reason: HOSPADM

## 2019-10-18 RX ORDER — HYDROMORPHONE HYDROCHLORIDE 1 MG/ML
0.1 INJECTION, SOLUTION INTRAMUSCULAR; INTRAVENOUS; SUBCUTANEOUS
Status: DISCONTINUED | OUTPATIENT
Start: 2019-10-18 | End: 2019-10-18 | Stop reason: HOSPADM

## 2019-10-18 RX ORDER — MIDAZOLAM HYDROCHLORIDE 1 MG/ML
INJECTION INTRAMUSCULAR; INTRAVENOUS PRN
Status: DISCONTINUED | OUTPATIENT
Start: 2019-10-18 | End: 2019-10-18 | Stop reason: SURG

## 2019-10-18 RX ORDER — SIMETHICONE 80 MG
80 TABLET,CHEWABLE ORAL EVERY 8 HOURS PRN
Status: DISCONTINUED | OUTPATIENT
Start: 2019-10-18 | End: 2019-10-18 | Stop reason: HOSPADM

## 2019-10-18 RX ORDER — DIPHENHYDRAMINE HYDROCHLORIDE 50 MG/ML
12.5 INJECTION INTRAMUSCULAR; INTRAVENOUS
Status: DISCONTINUED | OUTPATIENT
Start: 2019-10-18 | End: 2019-10-18 | Stop reason: HOSPADM

## 2019-10-18 RX ORDER — PROCHLORPERAZINE EDISYLATE 5 MG/ML
10 INJECTION INTRAMUSCULAR; INTRAVENOUS EVERY 6 HOURS PRN
Status: DISCONTINUED | OUTPATIENT
Start: 2019-10-18 | End: 2019-10-18 | Stop reason: HOSPADM

## 2019-10-18 RX ORDER — OXYCODONE HYDROCHLORIDE AND ACETAMINOPHEN 5; 325 MG/1; MG/1
1 TABLET ORAL EVERY 6 HOURS PRN
Qty: 28 TAB | Refills: 0 | Status: SHIPPED | OUTPATIENT
Start: 2019-10-18 | End: 2019-10-25

## 2019-10-18 RX ORDER — PROMETHAZINE HYDROCHLORIDE 25 MG/1
12.5 SUPPOSITORY RECTAL EVERY 4 HOURS PRN
Status: DISCONTINUED | OUTPATIENT
Start: 2019-10-18 | End: 2019-10-18 | Stop reason: HOSPADM

## 2019-10-18 RX ORDER — SCOLOPAMINE TRANSDERMAL SYSTEM 1 MG/1
PATCH, EXTENDED RELEASE TRANSDERMAL
Status: COMPLETED
Start: 2019-10-18 | End: 2019-10-18

## 2019-10-18 RX ORDER — SODIUM CHLORIDE, SODIUM LACTATE, POTASSIUM CHLORIDE, CALCIUM CHLORIDE 600; 310; 30; 20 MG/100ML; MG/100ML; MG/100ML; MG/100ML
INJECTION, SOLUTION INTRAVENOUS CONTINUOUS
Status: DISCONTINUED | OUTPATIENT
Start: 2019-10-18 | End: 2019-10-18 | Stop reason: HOSPADM

## 2019-10-18 RX ORDER — HALOPERIDOL 5 MG/ML
1 INJECTION INTRAMUSCULAR
Status: DISCONTINUED | OUTPATIENT
Start: 2019-10-18 | End: 2019-10-18 | Stop reason: HOSPADM

## 2019-10-18 RX ORDER — HYDROMORPHONE HYDROCHLORIDE 1 MG/ML
0.4 INJECTION, SOLUTION INTRAMUSCULAR; INTRAVENOUS; SUBCUTANEOUS
Status: DISCONTINUED | OUTPATIENT
Start: 2019-10-18 | End: 2019-10-18 | Stop reason: HOSPADM

## 2019-10-18 RX ORDER — SCOLOPAMINE TRANSDERMAL SYSTEM 1 MG/1
1 PATCH, EXTENDED RELEASE TRANSDERMAL
Status: DISCONTINUED | OUTPATIENT
Start: 2019-10-18 | End: 2019-10-18 | Stop reason: HOSPADM

## 2019-10-18 RX ORDER — PROMETHAZINE HYDROCHLORIDE 25 MG/1
25 TABLET ORAL EVERY 6 HOURS PRN
Qty: 30 TAB | Refills: 0 | Status: SHIPPED | OUTPATIENT
Start: 2019-10-18

## 2019-10-18 RX ORDER — OXYCODONE HCL 5 MG/5 ML
5 SOLUTION, ORAL ORAL
Status: COMPLETED | OUTPATIENT
Start: 2019-10-18 | End: 2019-10-18

## 2019-10-18 RX ORDER — OXYCODONE HCL 5 MG/5 ML
10 SOLUTION, ORAL ORAL
Status: COMPLETED | OUTPATIENT
Start: 2019-10-18 | End: 2019-10-18

## 2019-10-18 RX ORDER — DEXAMETHASONE SODIUM PHOSPHATE 4 MG/ML
INJECTION, SOLUTION INTRA-ARTICULAR; INTRALESIONAL; INTRAMUSCULAR; INTRAVENOUS; SOFT TISSUE PRN
Status: DISCONTINUED | OUTPATIENT
Start: 2019-10-18 | End: 2019-10-18 | Stop reason: SURG

## 2019-10-18 RX ORDER — SCOLOPAMINE TRANSDERMAL SYSTEM 1 MG/1
1 PATCH, EXTENDED RELEASE TRANSDERMAL
Status: CANCELLED | OUTPATIENT
Start: 2019-10-18

## 2019-10-18 RX ADMIN — FENTANYL CITRATE 50 MCG: 50 INJECTION INTRAMUSCULAR; INTRAVENOUS at 15:38

## 2019-10-18 RX ADMIN — DEXAMETHASONE SODIUM PHOSPHATE 4 MG: 4 INJECTION, SOLUTION INTRA-ARTICULAR; INTRALESIONAL; INTRAMUSCULAR; INTRAVENOUS; SOFT TISSUE at 14:38

## 2019-10-18 RX ADMIN — OXYCODONE HYDROCHLORIDE 10 MG: 5 SOLUTION ORAL at 15:42

## 2019-10-18 RX ADMIN — HYDROMORPHONE HYDROCHLORIDE 0.4 MG: 1 INJECTION, SOLUTION INTRAMUSCULAR; INTRAVENOUS; SUBCUTANEOUS at 16:13

## 2019-10-18 RX ADMIN — SCOPALAMINE 1 PATCH: 1 PATCH, EXTENDED RELEASE TRANSDERMAL at 14:26

## 2019-10-18 RX ADMIN — FENTANYL CITRATE 50 MCG: 50 INJECTION INTRAMUSCULAR; INTRAVENOUS at 15:48

## 2019-10-18 RX ADMIN — SODIUM CHLORIDE, POTASSIUM CHLORIDE, SODIUM LACTATE AND CALCIUM CHLORIDE: 600; 310; 30; 20 INJECTION, SOLUTION INTRAVENOUS at 13:59

## 2019-10-18 RX ADMIN — MIDAZOLAM 2 MG: 1 INJECTION INTRAMUSCULAR; INTRAVENOUS at 14:31

## 2019-10-18 RX ADMIN — FENTANYL CITRATE 100 MCG: 50 INJECTION, SOLUTION INTRAMUSCULAR; INTRAVENOUS at 14:35

## 2019-10-18 RX ADMIN — HYDROMORPHONE HYDROCHLORIDE 0.4 MG: 1 INJECTION, SOLUTION INTRAMUSCULAR; INTRAVENOUS; SUBCUTANEOUS at 16:32

## 2019-10-18 NOTE — OR NURSING
1528-Received via gurney from OR.  RR even, unlabored.  Peripad in place    1530-Resting    1540-Arousing with CO pain-8/10-medicated    1545-Taking PO fluids, medicated for CO pain.  Heat pack to abdomen    1600-Attempt to locate  not in WR    1605- in to visit.      1630-DC instructions discussed & signed.  Questions answered.  Medicated for CO cramping.  Transferred to phase II per criteria    1645-States feeling better.  Cramping decreasing    1700-DC via WC to car.  Gait steady

## 2019-10-18 NOTE — DISCHARGE INSTRUCTIONS
ACTIVITY: Rest and take it easy for the first 24 hours.  A responsible adult is recommended to remain with you during that time.  It is normal to feel sleepy.  We encourage you to not do anything that requires balance, judgment or coordination.    MILD FLU-LIKE SYMPTOMS ARE NORMAL. YOU MAY EXPERIENCE GENERALIZED MUSCLE ACHES, THROAT IRRITATION, HEADACHE AND/OR SOME NAUSEA.    FOR 24 HOURS DO NOT:  Drive, operate machinery or run household appliances.  Drink beer or alcoholic beverages.   Make important decisions or sign legal documents.    SPECIAL INSTRUCTIONS: **FOLLOW DR MUNOZ'S INSTRUCTIONS  HYSTEROSCOPY*    DIET: To avoid nausea, slowly advance diet as tolerated, avoiding spicy or greasy foods for the first day.  Add more substantial food to your diet according to your physician's instructions.  Babies can be fed formula or breast milk as soon as they are hungry.  INCREASE FLUIDS AND FIBER TO AVOID CONSTIPATION.    SURGICAL DRESSING/BATHING: *MAY SHOWER TOMORROW**    FOLLOW-UP APPOINTMENT:  A follow-up appointment should be arranged with your doctor in *Monday 10/28/19**; call to schedule.    You should CALL YOUR PHYSICIAN if you develop:  Fever greater than 101 degrees F.  Pain not relieved by medication, or persistent nausea or vomiting.  Excessive bleeding (blood soaking through dressing) or unexpected drainage from the wound.  Extreme redness or swelling around the incision site, drainage of pus or foul smelling drainage.  Inability to urinate or empty your bladder within 8 hours.  Problems with breathing or chest pain.    You should call 911 if you develop problems with breathing or chest pain.  If you are unable to contact your doctor or surgical center, you should go to the nearest emergency room or urgent care center.   Physician's telephone #: *DR [phone]**    If any questions arise, call your doctor.  If your doctor is not available, please feel free to call the Surgical Center at  [phone].  The Center is open Monday through Friday from 7AM to 7PM.  You can also call the HEALTH HOTLINE open 24 hours/day, 7 days/week and speak to a nurse at [phone], or toll free at [phone].    A registered nurse may call you a few days after your surgery to see how you are doing after your procedure.    MEDICATIONS: Resume taking daily medication.  Take prescribed pain medication with food.  If no medication is prescribed, you may take non-aspirin pain medication if needed.  PAIN MEDICATION CAN BE VERY CONSTIPATING.  Take a stool softener or laxative such as senokot, pericolace, or milk of magnesia if needed.    Prescription given for *PERCOCET, & PHENERGAN**.  Last pain medication given at *OXYCODONE GIVEN AT 345PM.  MAY REMOVE SCOPALAMINE PATCH BEHIND LEFT EAR IN 72 HOURS    If your physician has prescribed pain medication that includes Acetaminophen (Tylenol), do not take additional Acetaminophen (Tylenol) while taking the prescribed medication.    Depression / Suicide Risk    As you are discharged from this [facility] facility, it is important to learn how to keep safe from harming yourself.    Recognize the warning signs:  · Abrupt changes in personality, positive or negative- including increase in energy   · Giving away possessions  · Change in eating patterns- significant weight changes-  positive or negative  · Change in sleeping patterns- unable to sleep or sleeping all the time   · Unwillingness or inability to communicate  · Depression  · Unusual sadness, discouragement and loneliness  · Talk of wanting to die  · Neglect of personal appearance   · Rebelliousness- reckless behavior  · Withdrawal from people/activities they love  · Confusion- inability to concentrate     If you or a loved one observes any of these behaviors or has concerns about self-harm, here's what you can do:  · Talk about it- your feelings and reasons for harming yourself  · Remove any means that you might  use to hurt yourself (examples: pills, rope, extension cords, firearm)  · Get professional help from the community (Mental Health, Substance Abuse, psychological counseling)  · Do not be alone:Call your Safe Contact- someone whom you trust who will be there for you.  · Call your local CRISIS HOTLINE 158-9958 or 616-295-6981  · Call your local Children's Mobile Crisis Response Team [place] (982) 286-0337 or www.Profound  · Call the toll free National Suicide Prevention Hotlines   · National Suicide Prevention Lifeline 318-865-QMUB (2533)  · National Hope Line Network 800-SUICIDE (659-7651)

## 2019-10-18 NOTE — OR SURGEON
Immediate Post OP Note    PreOp Diagnosis:   Menorrhagia, ostensibly due or due in part to the patient’s anticoagulation (she is on Xarelto because of history of DVT and PE and she is positive for factor V Leiden).  She is status post tubal sterilization procedure performed earlier this year.    PostOp Diagnosis:   Menorrhagia, ostensibly due or due in part to the patient’s anticoagulation (she is on Xarelto because of history of DVT and PE and she is positive for factor V Leiden).  She is status post tubal sterilization procedure performed earlier this year.    Procedure(s):  HYSTEROSCOPY, WITH VIDEO IMAGING - Wound Class: Clean Contaminated  DILATION AND CURETTAGE - Wound Class: Clean Contaminated  ABLATION, ENDOMETRIUM, THERMAL, HYSTEROSCOPIC - Wound Class: Clean Contaminated    Surgeon(s):  Greg Franklin M.D.    Anesthesiologist/Type of Anesthesia:  Anesthesiologist: Marco Hurtado M.D./General    Surgical Staff:  Circulator: Funmi العلي R.N.  Relief Scrub: Rosa Correia  Scrub Person: Wilmer Stephens    Specimens removed if any:  ID Type Source Tests Collected by Time Destination   A : endometrial curettings Tissue Endometrium PATHOLOGY SPECIMEN Greg Franklin M.D. 10/18/2019  2:57 PM        Estimated Blood Loss:   Less than 20 cc's.     Findings:   Speculum exam reveals no vulvar or vaginal or cervical lesions.   At hysteroscopy no evidence of endometrial polyps is seen and no evidence of any congenital uterine anomaly is seen and no evidence of any submucosal fibroid is seen.   When hysteroscopy is performed during and following endometrial ablation the entire intrauterine cavity is found to be nicely and thoroughly ablated.     Complications:   None.         10/18/2019 3:20 PM Greg Franklin M.D.

## 2019-10-18 NOTE — ANESTHESIA PREPROCEDURE EVALUATION
Relevant Problems   CARDIAC   (+) Pulmonary embolism (HCC)      GI   (+) PUD (peptic ulcer disease)      Other   (+) Factor V Leiden (HCC)       Physical Exam    Airway   Mallampati: II  TM distance: >3 FB  Neck ROM: full       Cardiovascular - normal exam  Rhythm: regular  Rate: normal  (-) murmur     Dental - normal exam         Pulmonary - normal exam  Breath sounds clear to auscultation     Abdominal    Neurological - normal exam         Other findings: Nose ring, ear ring. Wedding ring. Does not want to take off. Unable to take jewelry off.            Anesthesia Plan    ASA 2       Plan - general       Airway plan will be LMA        Induction: intravenous    Postoperative Plan: Postoperative administration of opioids is intended.    Pertinent diagnostic labs and testing reviewed    Informed Consent:    Anesthetic plan and risks discussed with patient.

## 2019-10-18 NOTE — OP REPORT
DATE OF SERVICE:  10/18/2019    PREOPERATIVE DIAGNOSES:  Menorrhagia, ostensibly due in part to the patient's   anticoagulation (she is on Xarelto because of her history of DVT and PE and   she is positive for factor V Leiden).  She is status post laparoscopic   bilateral tubal sterilization procedure performed earlier this year.    POSTOPERATIVE DIAGNOSES:  Menorrhagia, ostensibly due in part to the patient's   anticoagulation (she is on Xarelto because of her history of DVT and PE and   she is positive for factor V Leiden).  She is status post laparoscopic   bilateral tubal sterilization procedure performed earlier this year.    PROCEDURES PERFORMED:  Hysteroscopy, D and C, hydrothermal endometrial   ablation.    SURGEON:  Greg Franklin MD    ANESTHESIA:  General anesthesia.    ANESTHESIOLOGIST:  Marco Hurtado MD    FINDINGS:  Speculum exam under anesthesia reveals that there were no vulvar or   vaginal or cervical lesions.  During hysteroscopy, excellent views of the   entire intrauterine cavity were obtained and both tubal ostia are clearly   visualized.  During hysteroscopy, no evidence of any endometrial polyps was   seen and no evidence of any congenital uterine anomaly was seen and no   evidence of any submucosal fibroid was seen.  When hysteroscopy was performed   during and following endometrial ablation, the entire intrauterine cavity was   found to be nicely and thoroughly ablated.    SPECIMENS:  Endometrial curettings.    COMPLICATIONS:  None.    ESTIMATED BLOOD LOSS:  Less than 20 mL    DESCRIPTION OF PROCEDURE:  After appropriate consents have been obtained, the   patient was taken to the operating room and given general anesthesia.  She was   prepped and draped in the dorsal lithotomy position and the bladder was   emptied with a catheter.  A speculum exam was performed and reveals that there   are no vulvar or vaginal or cervical lesions.  The cervix was well   visualized.  The anterior  aspect of the cervix was grasped with a single tooth   tenaculum.  The uterus was easily dilated with Hanks and then Hegar dilators   and was dilated to a Hegar #8.  The hysteroscope was advanced through the   endocervical canal into the intrauterine cavity and hysteroscopy was performed   and excellent views of the entire intrauterine cavity were obtained.  Both   tubal ostia were seen.  During hysteroscopy, no evidence of any congenital   uterine anomaly was seen and no evidence of endometrial polyps was seen and no   evidence of any submucosal fibroid was seen.  The hysteroscope was removed   and a sharp curette was introduced through the endocervical canal into the   intrauterine cavity and all 4 quadrants of the intrauterine cavity were   thoroughly curetted and curettings were submitted on a Telfa pad.  The sharp   curette was removed and the hysteroscope was reinserted through the   endocervical canal into the intrauterine cavity and hysteroscopy was   continued.  Then, after the usual tests were performed and passed,   hydrothermal endometrial ablation was performed.  After the usual 90-second   warmup, hydrothermal endometrial ablation was continued for 10 minutes and   during this time, the entire intrauterine cavity was found to become   thoroughly ablated.  Then, after the usual 90-second cool down period was   performed, the entire intrauterine cavity was again found to be nicely and   thoroughly ablated.  Pictures were taken.  The hysteroscope was removed.  The   single tooth tenaculum was removed from the cervix.  The cervix was examined   and no bleeding was seen coming from the cervix, either from the site of   attachment of the single tooth tenaculum or from the external cervical os.    The speculum was then removed.  The procedure was terminated with final lap   counts reported to be correct at the end of the procedure.  The patient   tolerated the procedure well and sent to postanesthesia  recovery in stable   condition.       ____________________________________     Greg Franklin MD    MED / NTS    DD:  10/18/2019 15:41:36  DT:  10/18/2019 16:52:25    D#:  [id number]  Job#:  [id number]    cc: Marco Hurtado MD

## 2019-10-18 NOTE — H&P
Kate Shelton          YOB: 1985  Date of today's procedures: Friday, October 18, 2019  Facility: [facility]    ID: The patient is a very pleasant 33-year-old multipara.  She has had 3 previous vaginal deliveries.    Chief Complaint: The patient complains of menorrhagia.    History of Present Illness: The patient was seen in the office on Tuesday, October 8, 2019.  At that time she told me that she had been having problems with menorrhagia.  She told me that she had a normal spontaneous vaginal delivery in May of this year, approximately 5 months ago.  She told me that prior to her most recent pregnancy she had had a DVT and pulmonary embolism and was subsequently diagnosed as being positive for factor V Leiden.  She told me that she is not breast-feeding and that she is taking Xarelto.  She tells me that when she was switched to Xarelto after she delivered she began having regular menstrual periods and her menses have been characterized by extremely heavy blood flow.  She told me that she will soak through both a super tampon and large absorbent pad, at the same time, every 2 hours during her menses.  She told me that during her menses she begins to feel weak and dizzy.  She told me that prior to starting anticoagulation she never previously had had these problems with menorrhagia.  She has denied any dysmenorrhea.  She says that she very much wishes to proceed with endometrial ablation.  She is scheduled today for hysteroscopy, D&C, and hydrothermal endometrial ablation.  I have discussed with her and explained to her in detail and at length what hysteroscopy, D&C, and hydrothermal endometrial ablation is and what hysteroscopy, D&C, and hydrothermal endometrial ablation involves, and I have discussed with her in detail and at length the risks and benefits and alternatives of hysteroscopy, D&C, and hydrothermal endometrial ablation.  After our discussions and after  answering her questions she told me that she very much wishes for us to proceed with hysteroscopy, D&C, and hydrothermal endometrial ablation.    Past Medical History: The patient has been diagnosed as being positive for factor V Leiden.  She has been diagnosed as having bipolar disorder and also posttraumatic stress disorder.    Past Surgical History: The patient has had surgery involving her breasts and 2006.  She had a laparoscopic tubal sterilization performed earlier this year.    Medications: The patient takes Xarelto.    Allergies: The patient says that she is allergic to Zofran and that she is allergic to nonsteroidal anti-inflammatory drugs.    Social History: The patient has smoke.  Richa rarely consumes alcoholic beverages.  She denies the use of recreational drugs.    Family History: Noncontributory    Review of Systems  General: The patient denies any fevers, chills, sweats.  Pulmonary: The patient denies any coughing, wheezing, chest pain, shortness of breath.  Cardiovascular: The patient denies any palpitations, dyspnea, chest pain.  Gastrointestinal: The patient denies any nausea, vomiting, diarrhea, constipation, hematochezia, melena, history of hepatitis, history of jaundice.  Genitourinary: The patient complains of menorrhagia.  She denies dysmenorrhea.  Musculoskeletal: The patient denies any arthralgias or myalgias.   Neurological: No headaches or syncope or seizures.     Physical Exam:   Vital Signs: The patient's vital signs are stable and she is afebrile.  General: The patient appears well developed and well nourished and relaxed and alert and comfortable and in no apparent distress.    HEENT :  Normo-cephalic, atraumatic, pupils equal, round, reactive to light and accommodation, extra ocular motions intact, pharynx clear; there is no thyromegaly. There is no cervical lymphadenopathy.  Chest: Heart regular rate and rhythm, with no murmurs or rubs or gallops; the lungs are clear to  auscultation bilaterally.  Abdomen: The abdomen is soft and flat and non-tender and non-distended. There is no hepatomegaly. There is no splenomegaly.   Pelvic: Speculum exam reveals that there are no vulvar or vaginal or cervical lesions and that there is some cervical/vaginal discharge.  The vulva and vaginal mucosa appear well estrogenized.  Bimanual exam reveals no evidence of cervical motion tenderness and no evidence of any tenderness to palpation of the uterine corpus and no evidence of uterine enlargement and no evidence of any adnexal masses or tenderness either on the right or the left.  Extremities: No clubbing or cyanosis or edema.   Neurological: non-focal.     Assessment:   Menorrhagia, ostensibly due or due in part to the patient’s anticoagulation (she is on Xarelto because of history of DVT and PE and she is positive for factor V Leiden).  She is status post tubal sterilization procedure performed earlier this year.    Plan:   We will proceed today with hysteroscopy, D&C, and hydrothermal endometrial ablation.  Please see above.            ________________________  Greg Franklin M.D.

## 2019-10-18 NOTE — ANESTHESIA PROCEDURE NOTES
Airway  Date/Time: 10/18/2019 2:37 PM  Performed by: Marco Hurtado M.D.  Authorized by: Marco Hurtado M.D.     Location:  OR  Urgency:  Elective  Indications for Airway Management:  Anesthesia  Spontaneous Ventilation: absent    Sedation Level:  Deep  Preoxygenated: Yes    Mask Difficulty Assessment:  0 - not attempted  Final Airway Type:  Supraglottic airway  Final Supraglottic Airway:  Standard LMA  SGA Size:  4  Number of Attempts at Approach:  1

## 2019-10-18 NOTE — ANESTHESIA TIME REPORT
Anesthesia Start and Stop Event Times     Date Time Event    10/18/2019 1430 Ready for Procedure     1431 Anesthesia Start     1528 Anesthesia Stop        Responsible Staff  10/18/19    Name Role Begin End    Marco Hurtado M.D. Anesth 1431 1528        Preop Diagnosis (Free Text):  Pre-op Diagnosis     MENORRHAGIA        Preop Diagnosis (Codes):    Post op Diagnosis  Menorrhagia      Premium Reason  A. 3PM - 7AM    Comments:

## 2019-10-20 NOTE — ANESTHESIA POSTPROCEDURE EVALUATION
Patient: Kate Paul Backteodora    Procedure Summary     Date:  10/18/19 Room / Location:  [facility] ROOM 21 / SURGERY SAME DAY [facility]    Anesthesia Start:  1431 Anesthesia Stop:  1528    Procedures:       HYSTEROSCOPY, WITH VIDEO IMAGING (N/A )      DILATION AND CURETTAGE (N/A )      ABLATION, ENDOMETRIUM, THERMAL, HYSTEROSCOPIC (N/A ) Diagnosis:  (MENORRHAGIA)    Surgeon:  Greg Franklin M.D. Responsible Provider:  Marco Hurtado M.D.    Anesthesia Type:  general ASA Status:  2          Final Anesthesia Type: general  Last vitals  BP   Blood Pressure: 115/83    Temp   36.5 °C (97.7 °F)    Pulse   Pulse: 70   Resp   15    SpO2   100 %      Anesthesia Post Evaluation    Patient location during evaluation: PACU  Patient participation: complete - patient participated  Level of consciousness: awake and alert    Airway patency: patent  Anesthetic complications: no  Cardiovascular status: hemodynamically stable  Respiratory status: acceptable  Hydration status: euvolemic    PONV: none           Nurse Pain Score: 4 (NPRS)

## 2020-01-29 DIAGNOSIS — Z79.01 CHRONIC ANTICOAGULATION: ICD-10-CM

## (undated) DEVICE — CANISTER SUCTION 3000ML MECHANICAL FILTER AUTO SHUTOFF MEDI-VAC NONSTERILE LF DISP  (40EA/CA)

## (undated) DEVICE — KIT  I.V. START (100EA/CA)

## (undated) DEVICE — PROTECTOR ULNA NERVE - (36PR/CA)

## (undated) DEVICE — Device

## (undated) DEVICE — GOWN WARMING STANDARD FLEX - (30/CA)

## (undated) DEVICE — SUCTION INSTRUMENT YANKAUER BULBOUS TIP W/O VENT (50EA/CA)

## (undated) DEVICE — GLOVE BIOGEL SZ 7 SURGICAL PF LTX - (50PR/BX 4BX/CA)

## (undated) DEVICE — DRESSING TRANSPARENT FILM TEGADERM 4 X 4.75" (50EA/BX)"

## (undated) DEVICE — SET LEADWIRE 5 LEAD BEDSIDE DISPOSABLE ECG (1SET OF 5/EA)

## (undated) DEVICE — ELECTRODE 850 FOAM ADHESIVE - HYDROGEL RADIOTRNSPRNT (50/PK)

## (undated) DEVICE — CATHETER IV 20 GA X 1-1/4 ---SURG.& SDS ONLY--- (50EA/BX)

## (undated) DEVICE — CHLORAPREP 26 ML APPLICATOR - ORANGE TINT(25/CA)

## (undated) DEVICE — LACTATED RINGERS INJ 1000 ML - (14EA/CA 60CA/PF)

## (undated) DEVICE — PACK LAPAROSCOPY - (1/CA)

## (undated) DEVICE — TUBE CONNECTING SUCTION - CLEAR PLASTIC STERILE 72 IN (50EA/CA)

## (undated) DEVICE — PACK MINOR BASIN - (2EA/CA)

## (undated) DEVICE — TROCAR5X55 KII SHIELDED SYS - (6/BX)

## (undated) DEVICE — TRAY SRGPRP PVP IOD WT PRP - (20/CA)

## (undated) DEVICE — DRAPE LAPAROTOMY T SHEET - (12EA/CA)

## (undated) DEVICE — GLOVE SZ 7 BIOGEL PI MICRO - PF LF (50PR/BX 4BX/CA)

## (undated) DEVICE — SUTURE 3-0 VICRYL PLUS SH - 8X 18 INCH (12/BX)

## (undated) DEVICE — ARMREST CRADLE FOAM - (2PR/PK 12PR/CA)

## (undated) DEVICE — TUBE E-T HI-LO CUFF 7.0MM (10EA/PK)

## (undated) DEVICE — SLEEVE, VASO, THIGH, MED

## (undated) DEVICE — SODIUM CHL IRRIGATION 0.9% 1000ML (12EA/CA)

## (undated) DEVICE — SUTURE GENERAL

## (undated) DEVICE — TUBING CLEARLINK DUO-VENT - C-FLO (48EA/CA)

## (undated) DEVICE — CANISTER SUCTION RIGID RED 1500CC (40EA/CA)

## (undated) DEVICE — GLOVE BIOGEL SZ 8 SURGICAL PF LTX - (50PR/BX 4BX/CA)

## (undated) DEVICE — BLADE SURGICAL #10 - (50/BX)

## (undated) DEVICE — TUBING OUTFLOW HYSTEROSCPY (10EA/BX)

## (undated) DEVICE — TRAY FOLEY CATHETER STATLOCK 16FR SURESTEP  (10EA/CA)

## (undated) DEVICE — GLOVE BIOGEL PI INDICATOR SZ 7.5 SURGICAL PF LF -(50/BX 4BX/CA)

## (undated) DEVICE — WATER IRRIGATION STERILE 1000ML (12EA/CA)

## (undated) DEVICE — SYRINGE 10 ML CONTROL LL (25EA/BX 4BX/CA)

## (undated) DEVICE — TUBING INFLOW HYSTEROSCOPY (10EA/CA)

## (undated) DEVICE — ELECTRODE MONOPOLAR ANGLED CUTTING LOOP DIAM 0.35 YELLOW 24FR (6EA/PK)

## (undated) DEVICE — KIT ANESTHESIA W/CIRCUIT & 3/LT BAG W/FILTER (20EA/CA)

## (undated) DEVICE — GLOVE BIOGEL PI INDICATOR SZ 6.5 SURGICAL PF LF - (50/BX 4BX/CA)

## (undated) DEVICE — SUTURE 0 ETHIBOND MO6 C/R - (12/BX) 8-18 INCH ETHICON

## (undated) DEVICE — MASK ANESTHESIA ADULT  - (100/CA)

## (undated) DEVICE — SUTURE 4-0 VICRYL PLUS FS-2 - 27 INCH (36/BX)

## (undated) DEVICE — SUTURE 2-0 COATED VICRYL PLUS - 12 X 18 INCH (12/BX)

## (undated) DEVICE — NEPTUNE 4 PORT MANIFOLD - (20/PK)

## (undated) DEVICE — ELECTRODE DUAL RETURN W/ CORD - (50/PK)

## (undated) DEVICE — TROCAR Z THREAD 11 X 100 - BLADED (6/BX)

## (undated) DEVICE — CLOSURE SKIN STRIP 1/2 X 4 IN - (STERI STRIP) (50/BX 4BX/CA)

## (undated) DEVICE — HEAD HOLDER JUNIOR/ADULT

## (undated) DEVICE — GLOVE BIOGEL SZ 7.5 SURGICAL PF LTX - (50PR/BX 4BX/CA)

## (undated) DEVICE — SENSOR SPO2 NEO LNCS ADHESIVE (20/BX) SEE USER NOTES

## (undated) DEVICE — TROCARCANN&SEAL 5X55 ZTHREAD - 12/BX

## (undated) DEVICE — TUBING SETDISPOS HIGH FLOW II - (10/BX)

## (undated) DEVICE — ADHESIVE DERMABOND HVD MINI (12EA/BX)

## (undated) DEVICE — MASK, LARYNGEAL AIRWAY #4

## (undated) DEVICE — SPONGE GAUZESTER. 2X2 4-PL - (2/PK 50PK/BX 30BX/CS)

## (undated) DEVICE — TRAY SKIN SCRUB PVP WET (20EA/CA) PART #DYND70356 DISCONTINUED

## (undated) DEVICE — PAD SANITARY 11IN MAXI IND WRAPPED  (12EA/PK 24PK/CA)

## (undated) DEVICE — SUTURE 2-0 VICRYL PLUS CT-2 - 27 INCH (36/BX)

## (undated) DEVICE — GLOVE BIOGEL SZ 6.5 SURGICAL PF LTX (50PR/BX 4BX/CA)

## (undated) DEVICE — DERMABOND ADVANCED - (12EA/BX)

## (undated) DEVICE — GOWN SURGEONS X-LARGE - DISP. (30/CA)

## (undated) DEVICE — KIT HTA GENESYS - (5/BX)

## (undated) DEVICE — LIGASURE LAPAROSCOPIC 5MM - (6EA/CA)

## (undated) DEVICE — SUTURE 0 VICRYL PLUS CT-2 - 27 INCH (36/BX)

## (undated) DEVICE — SOLUTION SORBITOL 3000ML (4/CA)

## (undated) DEVICE — DRAPE 36X28IN RAD CARM BND BG - (25/CA) O

## (undated) DEVICE — SODIUM CHL. IRRIGATION 0.9% 3000ML (4EA/CA 65CA/PF)

## (undated) DEVICE — DRAPE UNDER BUTTOCKS FLUID - (20/CA)

## (undated) DEVICE — GLOVE SZ 6.5 BIOGEL PI MICRO - PF LF (50PR/BX)

## (undated) DEVICE — TROCAR 5X100 NON BLADED Z-TH - READ KII (6/BX)

## (undated) DEVICE — DRAPE VAGINAL BIB W/ POUCH (10EA/CA)

## (undated) DEVICE — GLOVE BIOGEL INDICATOR SZ 8 SURGICAL PF LTX - (50/BX 4BX/CA)